# Patient Record
Sex: FEMALE | Race: WHITE | NOT HISPANIC OR LATINO
[De-identification: names, ages, dates, MRNs, and addresses within clinical notes are randomized per-mention and may not be internally consistent; named-entity substitution may affect disease eponyms.]

---

## 2017-02-28 ENCOUNTER — APPOINTMENT (OUTPATIENT)
Age: 52
End: 2017-02-28

## 2017-02-28 VITALS
HEIGHT: 66 IN | BODY MASS INDEX: 24.11 KG/M2 | TEMPERATURE: 98.2 F | DIASTOLIC BLOOD PRESSURE: 72 MMHG | SYSTOLIC BLOOD PRESSURE: 116 MMHG | HEART RATE: 68 BPM | WEIGHT: 150 LBS

## 2017-03-01 LAB
A1AT SERPL-MCNC: 108 MG/DL
ALBUMIN SERPL ELPH-MCNC: 4.5 G/DL
ALP BLD-CCNC: 68 U/L
ALT SERPL-CCNC: 52 U/L
ANION GAP SERPL CALC-SCNC: 13 MMOL/L
AST SERPL-CCNC: 26 U/L
BASOPHILS # BLD AUTO: 0.02 K/UL
BASOPHILS NFR BLD AUTO: 0.3 %
BILIRUB SERPL-MCNC: 0.2 MG/DL
BUN SERPL-MCNC: 19 MG/DL
CALCIUM SERPL-MCNC: 10 MG/DL
CERULOPLASMIN SERPL-MCNC: 20 MG/DL
CHLORIDE SERPL-SCNC: 101 MMOL/L
CO2 SERPL-SCNC: 26 MMOL/L
CREAT SERPL-MCNC: 0.87 MG/DL
DEPRECATED KAPPA LC FREE/LAMBDA SER: 1.29 RATIO
EOSINOPHIL # BLD AUTO: 0.09 K/UL
EOSINOPHIL NFR BLD AUTO: 1.1 %
FERRITIN SERPL-MCNC: 281.3 NG/ML
GLUCOSE SERPL-MCNC: 86 MG/DL
HBV CORE IGG+IGM SER QL: REACTIVE
HCT VFR BLD CALC: 36.8 %
HGB BLD-MCNC: 12 G/DL
IGA SER QL IEP: 175 MG/DL
IGG SER QL IEP: 1350 MG/DL
IGM SER QL IEP: 168 MG/DL
IMM GRANULOCYTES NFR BLD AUTO: 0.1 %
IRON SATN MFR SERPL: 27 %
IRON SERPL-MCNC: 77 UG/DL
KAPPA LC CSF-MCNC: 1.43 MG/DL
KAPPA LC SERPL-MCNC: 1.84 MG/DL
LYMPHOCYTES # BLD AUTO: 2.18 K/UL
LYMPHOCYTES NFR BLD AUTO: 27.7 %
MAN DIFF?: NORMAL
MCHC RBC-ENTMCNC: 30.6 PG
MCHC RBC-ENTMCNC: 32.6 GM/DL
MCV RBC AUTO: 93.9 FL
MONOCYTES # BLD AUTO: 0.54 K/UL
MONOCYTES NFR BLD AUTO: 6.9 %
NEUTROPHILS # BLD AUTO: 5.03 K/UL
NEUTROPHILS NFR BLD AUTO: 63.9 %
PLATELET # BLD AUTO: 239 K/UL
POTASSIUM SERPL-SCNC: 4 MMOL/L
PROT SERPL-MCNC: 7.4 G/DL
RBC # BLD: 3.92 M/UL
RBC # FLD: 12.7 %
SMOOTH MUSCLE AB SER QL IF: NORMAL
SODIUM SERPL-SCNC: 140 MMOL/L
TIBC SERPL-MCNC: 285 UG/DL
TSH SERPL-ACNC: 1.59 UIU/ML
TTG IGA SER IA-ACNC: <5 UNITS
TTG IGA SER-ACNC: NEGATIVE
UIBC SERPL-MCNC: 208 UG/DL
WBC # FLD AUTO: 7.87 K/UL

## 2017-03-02 LAB
ANA SER IF-ACNC: NEGATIVE
LKM AB SER QL IF: 1.3 UNITS

## 2017-03-03 LAB
HEV AB SER QL: NEGATIVE
SOLUBLE LIVER IGG SER IA-ACNC: < 20.1 UNITS

## 2017-03-10 ENCOUNTER — APPOINTMENT (OUTPATIENT)
Dept: ULTRASOUND IMAGING | Facility: HOSPITAL | Age: 52
End: 2017-03-10

## 2017-03-10 ENCOUNTER — OUTPATIENT (OUTPATIENT)
Dept: OUTPATIENT SERVICES | Facility: HOSPITAL | Age: 52
LOS: 1 days | End: 2017-03-10
Payer: COMMERCIAL

## 2017-03-10 DIAGNOSIS — R74.9 ABNORMAL SERUM ENZYME LEVEL, UNSPECIFIED: ICD-10-CM

## 2017-03-10 PROCEDURE — 76700 US EXAM ABDOM COMPLETE: CPT

## 2017-06-08 ENCOUNTER — APPOINTMENT (OUTPATIENT)
Age: 52
End: 2017-06-08

## 2018-02-14 ENCOUNTER — OUTPATIENT (OUTPATIENT)
Dept: OUTPATIENT SERVICES | Facility: HOSPITAL | Age: 53
LOS: 1 days | End: 2018-02-14
Payer: COMMERCIAL

## 2018-02-14 DIAGNOSIS — R05 COUGH: ICD-10-CM

## 2018-02-14 PROCEDURE — 71046 X-RAY EXAM CHEST 2 VIEWS: CPT

## 2018-02-14 PROCEDURE — 71046 X-RAY EXAM CHEST 2 VIEWS: CPT | Mod: 26

## 2018-02-17 ENCOUNTER — EMERGENCY (EMERGENCY)
Facility: HOSPITAL | Age: 53
LOS: 1 days | Discharge: HOME | End: 2018-02-17
Attending: EMERGENCY MEDICINE
Payer: COMMERCIAL

## 2018-02-17 VITALS
DIASTOLIC BLOOD PRESSURE: 74 MMHG | WEIGHT: 151.9 LBS | HEART RATE: 78 BPM | HEIGHT: 67 IN | TEMPERATURE: 98 F | SYSTOLIC BLOOD PRESSURE: 113 MMHG | OXYGEN SATURATION: 100 % | RESPIRATION RATE: 18 BRPM

## 2018-02-17 LAB
ALBUMIN SERPL ELPH-MCNC: 4.2 G/DL — SIGNIFICANT CHANGE UP (ref 3.5–5)
ALP SERPL-CCNC: 75 U/L — SIGNIFICANT CHANGE UP (ref 40–120)
ALT FLD-CCNC: 40 U/L DA — SIGNIFICANT CHANGE UP (ref 10–60)
ANION GAP SERPL CALC-SCNC: 4 MMOL/L — LOW (ref 5–17)
AST SERPL-CCNC: 19 U/L — SIGNIFICANT CHANGE UP (ref 10–40)
BASOPHILS # BLD AUTO: 0.1 K/UL — SIGNIFICANT CHANGE UP (ref 0–0.2)
BASOPHILS NFR BLD AUTO: 0.8 % — SIGNIFICANT CHANGE UP (ref 0–2)
BILIRUB SERPL-MCNC: 0.3 MG/DL — SIGNIFICANT CHANGE UP (ref 0.2–1.2)
BUN SERPL-MCNC: 17 MG/DL — SIGNIFICANT CHANGE UP (ref 7–18)
CALCIUM SERPL-MCNC: 9.6 MG/DL — SIGNIFICANT CHANGE UP (ref 8.4–10.5)
CHLORIDE SERPL-SCNC: 105 MMOL/L — SIGNIFICANT CHANGE UP (ref 96–108)
CO2 SERPL-SCNC: 27 MMOL/L — SIGNIFICANT CHANGE UP (ref 22–31)
CREAT SERPL-MCNC: 0.72 MG/DL — SIGNIFICANT CHANGE UP (ref 0.5–1.3)
EOSINOPHIL # BLD AUTO: 0.1 K/UL — SIGNIFICANT CHANGE UP (ref 0–0.5)
EOSINOPHIL NFR BLD AUTO: 1.1 % — SIGNIFICANT CHANGE UP (ref 0–6)
GLUCOSE SERPL-MCNC: 90 MG/DL — SIGNIFICANT CHANGE UP (ref 70–99)
HCG SERPL-ACNC: 1 MIU/ML — SIGNIFICANT CHANGE UP
HCT VFR BLD CALC: 43.3 % — SIGNIFICANT CHANGE UP (ref 34.5–45)
HGB BLD-MCNC: 14.1 G/DL — SIGNIFICANT CHANGE UP (ref 11.5–15.5)
LYMPHOCYTES # BLD AUTO: 1.9 K/UL — SIGNIFICANT CHANGE UP (ref 1–3.3)
LYMPHOCYTES # BLD AUTO: 23.7 % — SIGNIFICANT CHANGE UP (ref 13–44)
MCHC RBC-ENTMCNC: 29.6 PG — SIGNIFICANT CHANGE UP (ref 27–34)
MCHC RBC-ENTMCNC: 32.5 GM/DL — SIGNIFICANT CHANGE UP (ref 32–36)
MCV RBC AUTO: 91.1 FL — SIGNIFICANT CHANGE UP (ref 80–100)
MONOCYTES # BLD AUTO: 0.4 K/UL — SIGNIFICANT CHANGE UP (ref 0–0.9)
MONOCYTES NFR BLD AUTO: 5 % — SIGNIFICANT CHANGE UP (ref 2–14)
NEUTROPHILS # BLD AUTO: 5.6 K/UL — SIGNIFICANT CHANGE UP (ref 1.8–7.4)
NEUTROPHILS NFR BLD AUTO: 69.4 % — SIGNIFICANT CHANGE UP (ref 43–77)
PLATELET # BLD AUTO: 276 K/UL — SIGNIFICANT CHANGE UP (ref 150–400)
POTASSIUM SERPL-MCNC: 4.1 MMOL/L — SIGNIFICANT CHANGE UP (ref 3.5–5.3)
POTASSIUM SERPL-SCNC: 4.1 MMOL/L — SIGNIFICANT CHANGE UP (ref 3.5–5.3)
PROT SERPL-MCNC: 8.9 G/DL — HIGH (ref 6–8.3)
RBC # BLD: 4.75 M/UL — SIGNIFICANT CHANGE UP (ref 3.8–5.2)
RBC # FLD: 10.6 % — SIGNIFICANT CHANGE UP (ref 10.3–14.5)
SODIUM SERPL-SCNC: 136 MMOL/L — SIGNIFICANT CHANGE UP (ref 135–145)
WBC # BLD: 8.1 K/UL — SIGNIFICANT CHANGE UP (ref 3.8–10.5)
WBC # FLD AUTO: 8.1 K/UL — SIGNIFICANT CHANGE UP (ref 3.8–10.5)

## 2018-02-17 PROCEDURE — 99285 EMERGENCY DEPT VISIT HI MDM: CPT

## 2018-02-17 PROCEDURE — 85027 COMPLETE CBC AUTOMATED: CPT

## 2018-02-17 PROCEDURE — 96375 TX/PRO/DX INJ NEW DRUG ADDON: CPT

## 2018-02-17 PROCEDURE — 93005 ELECTROCARDIOGRAM TRACING: CPT

## 2018-02-17 PROCEDURE — 84702 CHORIONIC GONADOTROPIN TEST: CPT

## 2018-02-17 PROCEDURE — 96374 THER/PROPH/DIAG INJ IV PUSH: CPT

## 2018-02-17 PROCEDURE — 80053 COMPREHEN METABOLIC PANEL: CPT

## 2018-02-17 PROCEDURE — 72131 CT LUMBAR SPINE W/O DYE: CPT

## 2018-02-17 PROCEDURE — 36415 COLL VENOUS BLD VENIPUNCTURE: CPT

## 2018-02-17 PROCEDURE — 99284 EMERGENCY DEPT VISIT MOD MDM: CPT | Mod: 25

## 2018-02-17 PROCEDURE — 72131 CT LUMBAR SPINE W/O DYE: CPT | Mod: 26

## 2018-02-17 RX ORDER — IBUPROFEN 200 MG
1 TABLET ORAL
Qty: 30 | Refills: 0
Start: 2018-02-17 | End: 2018-02-26

## 2018-02-17 RX ORDER — CYCLOBENZAPRINE HYDROCHLORIDE 10 MG/1
1 TABLET, FILM COATED ORAL
Qty: 21 | Refills: 0 | OUTPATIENT
Start: 2018-02-17 | End: 2018-02-23

## 2018-02-17 RX ORDER — IBUPROFEN 200 MG
1 TABLET ORAL
Qty: 30 | Refills: 0 | OUTPATIENT
Start: 2018-02-17 | End: 2018-02-26

## 2018-02-17 RX ORDER — ONDANSETRON 8 MG/1
1 TABLET, FILM COATED ORAL
Qty: 15 | Refills: 0 | OUTPATIENT
Start: 2018-02-17 | End: 2018-02-21

## 2018-02-17 RX ORDER — ONDANSETRON 8 MG/1
1 TABLET, FILM COATED ORAL
Qty: 15 | Refills: 0
Start: 2018-02-17 | End: 2018-02-21

## 2018-02-17 RX ORDER — ONDANSETRON 8 MG/1
4 TABLET, FILM COATED ORAL ONCE
Qty: 0 | Refills: 0 | Status: COMPLETED | OUTPATIENT
Start: 2018-02-17 | End: 2018-02-17

## 2018-02-17 RX ORDER — SODIUM CHLORIDE 9 MG/ML
1000 INJECTION INTRAMUSCULAR; INTRAVENOUS; SUBCUTANEOUS ONCE
Qty: 0 | Refills: 0 | Status: COMPLETED | OUTPATIENT
Start: 2018-02-17 | End: 2018-02-17

## 2018-02-17 RX ORDER — OXYCODONE AND ACETAMINOPHEN 5; 325 MG/1; MG/1
1 TABLET ORAL ONCE
Qty: 0 | Refills: 0 | Status: DISCONTINUED | OUTPATIENT
Start: 2018-02-17 | End: 2018-02-17

## 2018-02-17 RX ORDER — KETOROLAC TROMETHAMINE 30 MG/ML
30 SYRINGE (ML) INJECTION ONCE
Qty: 0 | Refills: 0 | Status: DISCONTINUED | OUTPATIENT
Start: 2018-02-17 | End: 2018-02-17

## 2018-02-17 RX ORDER — MORPHINE SULFATE 50 MG/1
4 CAPSULE, EXTENDED RELEASE ORAL ONCE
Qty: 0 | Refills: 0 | Status: DISCONTINUED | OUTPATIENT
Start: 2018-02-17 | End: 2018-02-17

## 2018-02-17 RX ORDER — CYCLOBENZAPRINE HYDROCHLORIDE 10 MG/1
10 TABLET, FILM COATED ORAL ONCE
Qty: 0 | Refills: 0 | Status: COMPLETED | OUTPATIENT
Start: 2018-02-17 | End: 2018-02-17

## 2018-02-17 RX ORDER — CYCLOBENZAPRINE HYDROCHLORIDE 10 MG/1
1 TABLET, FILM COATED ORAL
Qty: 21 | Refills: 0
Start: 2018-02-17 | End: 2018-02-23

## 2018-02-17 RX ADMIN — SODIUM CHLORIDE 1000 MILLILITER(S): 9 INJECTION INTRAMUSCULAR; INTRAVENOUS; SUBCUTANEOUS at 11:50

## 2018-02-17 RX ADMIN — MORPHINE SULFATE 4 MILLIGRAM(S): 50 CAPSULE, EXTENDED RELEASE ORAL at 15:05

## 2018-02-17 RX ADMIN — Medication 30 MILLIGRAM(S): at 15:05

## 2018-02-17 RX ADMIN — ONDANSETRON 4 MILLIGRAM(S): 8 TABLET, FILM COATED ORAL at 11:50

## 2018-02-17 RX ADMIN — Medication 30 MILLIGRAM(S): at 11:49

## 2018-02-17 RX ADMIN — CYCLOBENZAPRINE HYDROCHLORIDE 10 MILLIGRAM(S): 10 TABLET, FILM COATED ORAL at 15:05

## 2018-02-17 RX ADMIN — MORPHINE SULFATE 4 MILLIGRAM(S): 50 CAPSULE, EXTENDED RELEASE ORAL at 11:49

## 2018-02-17 NOTE — ED PROVIDER NOTE - MEDICAL DECISION MAKING DETAILS
53 y/o F pt presents with lower back pain. Plan for labs, imaging, give pain medication, and reassess.

## 2018-02-17 NOTE — ED PROVIDER NOTE - OBJECTIVE STATEMENT
53 y/o F pt with PMHx of DVT and High Cholesterol and no significant PSHx presents to ED c/o lower back pain with radiation down the L leg x4 days. Pt describes lower back pain as worse with movement and severe in intensity. Per pt, pt was recently diagnosed with PNA, and is currently being treated for PNA; pt experienced a coughing spell x4 days ago, prior to onset of back pain. Pt denies bowel incontinence, urinary incontinence, numbness, tingling, or any other complaints. Allergies: Morphine (rash).

## 2018-02-17 NOTE — ED ADULT NURSE NOTE - OBJECTIVE STATEMENT
52 yr old female complained of back pain, pt stated she was diagnose with pnuemonia,vital signs stable

## 2018-02-17 NOTE — ED PROVIDER NOTE - PROGRESS NOTE DETAILS
Pt improved, ambulatory, in no distress, requesting to leave, outpatient MRI ordered and cre coordinator will follow up with pt Monday. precautions reviewed.

## 2018-02-17 NOTE — ED ADULT TRIAGE NOTE - CHIEF COMPLAINT QUOTE
patient brought in by EMS for back pain. as per family member patient recently diagnosed of PNA c/o back pain x 4 days

## 2018-05-30 ENCOUNTER — TRANSCRIPTION ENCOUNTER (OUTPATIENT)
Age: 53
End: 2018-05-30

## 2018-05-30 ENCOUNTER — APPOINTMENT (OUTPATIENT)
Dept: ORTHOPEDIC SURGERY | Facility: CLINIC | Age: 53
End: 2018-05-30
Payer: MEDICAID

## 2018-05-30 VITALS
HEIGHT: 66 IN | BODY MASS INDEX: 24.11 KG/M2 | SYSTOLIC BLOOD PRESSURE: 102 MMHG | WEIGHT: 150 LBS | HEART RATE: 76 BPM | DIASTOLIC BLOOD PRESSURE: 63 MMHG

## 2018-05-30 PROCEDURE — 99203 OFFICE O/P NEW LOW 30 MIN: CPT

## 2018-06-21 ENCOUNTER — EMERGENCY (EMERGENCY)
Facility: HOSPITAL | Age: 53
LOS: 1 days | Discharge: HOME | End: 2018-06-21
Attending: EMERGENCY MEDICINE
Payer: MEDICAID

## 2018-06-21 VITALS
HEIGHT: 66 IN | WEIGHT: 154.98 LBS | DIASTOLIC BLOOD PRESSURE: 68 MMHG | TEMPERATURE: 98 F | HEART RATE: 75 BPM | OXYGEN SATURATION: 99 % | RESPIRATION RATE: 16 BRPM | SYSTOLIC BLOOD PRESSURE: 115 MMHG

## 2018-06-21 VITALS
HEART RATE: 68 BPM | OXYGEN SATURATION: 97 % | RESPIRATION RATE: 17 BRPM | DIASTOLIC BLOOD PRESSURE: 67 MMHG | SYSTOLIC BLOOD PRESSURE: 116 MMHG | TEMPERATURE: 98 F

## 2018-06-21 LAB
ANION GAP SERPL CALC-SCNC: 6 MMOL/L — SIGNIFICANT CHANGE UP (ref 5–17)
BASOPHILS # BLD AUTO: 0.1 K/UL — SIGNIFICANT CHANGE UP (ref 0–0.2)
BASOPHILS NFR BLD AUTO: 0.9 % — SIGNIFICANT CHANGE UP (ref 0–2)
BUN SERPL-MCNC: 16 MG/DL — SIGNIFICANT CHANGE UP (ref 7–18)
CALCIUM SERPL-MCNC: 9.5 MG/DL — SIGNIFICANT CHANGE UP (ref 8.4–10.5)
CHLORIDE SERPL-SCNC: 107 MMOL/L — SIGNIFICANT CHANGE UP (ref 96–108)
CO2 SERPL-SCNC: 26 MMOL/L — SIGNIFICANT CHANGE UP (ref 22–31)
CREAT SERPL-MCNC: 0.65 MG/DL — SIGNIFICANT CHANGE UP (ref 0.5–1.3)
EOSINOPHIL # BLD AUTO: 0 K/UL — SIGNIFICANT CHANGE UP (ref 0–0.5)
EOSINOPHIL NFR BLD AUTO: 0.5 % — SIGNIFICANT CHANGE UP (ref 0–6)
GLUCOSE SERPL-MCNC: 101 MG/DL — HIGH (ref 70–99)
HCG SERPL-ACNC: 1 MIU/ML — SIGNIFICANT CHANGE UP
HCT VFR BLD CALC: 38.8 % — SIGNIFICANT CHANGE UP (ref 34.5–45)
HGB BLD-MCNC: 12.8 G/DL — SIGNIFICANT CHANGE UP (ref 11.5–15.5)
LYMPHOCYTES # BLD AUTO: 1.5 K/UL — SIGNIFICANT CHANGE UP (ref 1–3.3)
LYMPHOCYTES # BLD AUTO: 17.2 % — SIGNIFICANT CHANGE UP (ref 13–44)
MCHC RBC-ENTMCNC: 31.2 PG — SIGNIFICANT CHANGE UP (ref 27–34)
MCHC RBC-ENTMCNC: 33.1 GM/DL — SIGNIFICANT CHANGE UP (ref 32–36)
MCV RBC AUTO: 94.2 FL — SIGNIFICANT CHANGE UP (ref 80–100)
MONOCYTES # BLD AUTO: 0.6 K/UL — SIGNIFICANT CHANGE UP (ref 0–0.9)
MONOCYTES NFR BLD AUTO: 7.5 % — SIGNIFICANT CHANGE UP (ref 2–14)
NEUTROPHILS # BLD AUTO: 6.4 K/UL — SIGNIFICANT CHANGE UP (ref 1.8–7.4)
NEUTROPHILS NFR BLD AUTO: 74 % — SIGNIFICANT CHANGE UP (ref 43–77)
PLATELET # BLD AUTO: 210 K/UL — SIGNIFICANT CHANGE UP (ref 150–400)
POTASSIUM SERPL-MCNC: 3.8 MMOL/L — SIGNIFICANT CHANGE UP (ref 3.5–5.3)
POTASSIUM SERPL-SCNC: 3.8 MMOL/L — SIGNIFICANT CHANGE UP (ref 3.5–5.3)
RBC # BLD: 4.12 M/UL — SIGNIFICANT CHANGE UP (ref 3.8–5.2)
RBC # FLD: 11.7 % — SIGNIFICANT CHANGE UP (ref 10.3–14.5)
SODIUM SERPL-SCNC: 139 MMOL/L — SIGNIFICANT CHANGE UP (ref 135–145)
WBC # BLD: 8.6 K/UL — SIGNIFICANT CHANGE UP (ref 3.8–10.5)
WBC # FLD AUTO: 8.6 K/UL — SIGNIFICANT CHANGE UP (ref 3.8–10.5)

## 2018-06-21 PROCEDURE — 70360 X-RAY EXAM OF NECK: CPT | Mod: 26

## 2018-06-21 PROCEDURE — 84702 CHORIONIC GONADOTROPIN TEST: CPT

## 2018-06-21 PROCEDURE — 71275 CT ANGIOGRAPHY CHEST: CPT

## 2018-06-21 PROCEDURE — 71275 CT ANGIOGRAPHY CHEST: CPT | Mod: 26

## 2018-06-21 PROCEDURE — 71046 X-RAY EXAM CHEST 2 VIEWS: CPT | Mod: 26

## 2018-06-21 PROCEDURE — 93010 ELECTROCARDIOGRAM REPORT: CPT

## 2018-06-21 PROCEDURE — 99285 EMERGENCY DEPT VISIT HI MDM: CPT | Mod: 25

## 2018-06-21 PROCEDURE — 70360 X-RAY EXAM OF NECK: CPT

## 2018-06-21 PROCEDURE — 85027 COMPLETE CBC AUTOMATED: CPT

## 2018-06-21 PROCEDURE — 94640 AIRWAY INHALATION TREATMENT: CPT

## 2018-06-21 PROCEDURE — 96374 THER/PROPH/DIAG INJ IV PUSH: CPT | Mod: XU

## 2018-06-21 PROCEDURE — 99284 EMERGENCY DEPT VISIT MOD MDM: CPT | Mod: 25

## 2018-06-21 PROCEDURE — 93005 ELECTROCARDIOGRAM TRACING: CPT

## 2018-06-21 PROCEDURE — 80048 BASIC METABOLIC PNL TOTAL CA: CPT

## 2018-06-21 PROCEDURE — 71046 X-RAY EXAM CHEST 2 VIEWS: CPT

## 2018-06-21 RX ORDER — ONDANSETRON 8 MG/1
1 TABLET, FILM COATED ORAL
Qty: 12 | Refills: 0
Start: 2018-06-21

## 2018-06-21 RX ORDER — SODIUM CHLORIDE 9 MG/ML
3 INJECTION INTRAMUSCULAR; INTRAVENOUS; SUBCUTANEOUS ONCE
Qty: 0 | Refills: 0 | Status: COMPLETED | OUTPATIENT
Start: 2018-06-21 | End: 2018-06-21

## 2018-06-21 RX ORDER — ALBUTEROL 90 UG/1
2.5 AEROSOL, METERED ORAL ONCE
Qty: 0 | Refills: 0 | Status: COMPLETED | OUTPATIENT
Start: 2018-06-21 | End: 2018-06-21

## 2018-06-21 RX ADMIN — ALBUTEROL 2.5 MILLIGRAM(S): 90 AEROSOL, METERED ORAL at 06:01

## 2018-06-21 RX ADMIN — Medication 30 MILLILITER(S): at 12:18

## 2018-06-21 RX ADMIN — Medication 125 MILLIGRAM(S): at 11:05

## 2018-06-21 RX ADMIN — SODIUM CHLORIDE 3 MILLILITER(S): 9 INJECTION INTRAMUSCULAR; INTRAVENOUS; SUBCUTANEOUS at 08:30

## 2018-06-21 NOTE — ED PROVIDER NOTE - OBJECTIVE STATEMENT
CC of cough x 1 week,  associated with post tussive vomiting. Pt also states has throat tightness and shortness of breath. No fever, no chest pain.

## 2018-06-21 NOTE — ED PROVIDER NOTE - CARE PLAN
Principal Discharge DX:	Throat tightness  Secondary Diagnosis:	Dyspnea Principal Discharge DX:	GERD (gastroesophageal reflux disease)

## 2018-10-12 ENCOUNTER — OUTPATIENT (OUTPATIENT)
Dept: OUTPATIENT SERVICES | Facility: HOSPITAL | Age: 53
LOS: 1 days | End: 2018-10-12
Payer: MEDICAID

## 2018-10-12 DIAGNOSIS — M79.622 PAIN IN LEFT UPPER ARM: ICD-10-CM

## 2018-10-12 DIAGNOSIS — M79.602 PAIN IN LEFT ARM: ICD-10-CM

## 2018-10-12 DIAGNOSIS — R10.32 LEFT LOWER QUADRANT PAIN: ICD-10-CM

## 2018-10-12 PROCEDURE — 73090 X-RAY EXAM OF FOREARM: CPT

## 2018-10-12 PROCEDURE — 73090 X-RAY EXAM OF FOREARM: CPT | Mod: 26,LT

## 2018-10-16 ENCOUNTER — APPOINTMENT (OUTPATIENT)
Dept: DERMATOLOGY | Facility: CLINIC | Age: 53
End: 2018-10-16

## 2019-03-23 ENCOUNTER — EMERGENCY (EMERGENCY)
Facility: HOSPITAL | Age: 54
LOS: 1 days | Discharge: HOME | End: 2019-03-23
Attending: EMERGENCY MEDICINE
Payer: MEDICAID

## 2019-03-23 VITALS
SYSTOLIC BLOOD PRESSURE: 108 MMHG | RESPIRATION RATE: 16 BRPM | TEMPERATURE: 97 F | DIASTOLIC BLOOD PRESSURE: 71 MMHG | WEIGHT: 156.09 LBS | OXYGEN SATURATION: 100 % | HEART RATE: 84 BPM

## 2019-03-23 LAB
ALBUMIN SERPL ELPH-MCNC: 4.1 G/DL — SIGNIFICANT CHANGE UP (ref 3.5–5)
ALP SERPL-CCNC: 91 U/L — SIGNIFICANT CHANGE UP (ref 40–120)
ALT FLD-CCNC: 70 U/L DA — HIGH (ref 10–60)
ANION GAP SERPL CALC-SCNC: 7 MMOL/L — SIGNIFICANT CHANGE UP (ref 5–17)
AST SERPL-CCNC: 38 U/L — SIGNIFICANT CHANGE UP (ref 10–40)
BASOPHILS # BLD AUTO: 0.03 K/UL — SIGNIFICANT CHANGE UP (ref 0–0.2)
BASOPHILS NFR BLD AUTO: 0.4 % — SIGNIFICANT CHANGE UP (ref 0–2)
BILIRUB SERPL-MCNC: 0.3 MG/DL — SIGNIFICANT CHANGE UP (ref 0.2–1.2)
BUN SERPL-MCNC: 13 MG/DL — SIGNIFICANT CHANGE UP (ref 7–18)
CALCIUM SERPL-MCNC: 9.4 MG/DL — SIGNIFICANT CHANGE UP (ref 8.4–10.5)
CHLORIDE SERPL-SCNC: 109 MMOL/L — HIGH (ref 96–108)
CO2 SERPL-SCNC: 24 MMOL/L — SIGNIFICANT CHANGE UP (ref 22–31)
CREAT SERPL-MCNC: 0.87 MG/DL — SIGNIFICANT CHANGE UP (ref 0.5–1.3)
EOSINOPHIL # BLD AUTO: 0.1 K/UL — SIGNIFICANT CHANGE UP (ref 0–0.5)
EOSINOPHIL NFR BLD AUTO: 1.4 % — SIGNIFICANT CHANGE UP (ref 0–6)
FLU A RESULT: SIGNIFICANT CHANGE UP
FLU A RESULT: SIGNIFICANT CHANGE UP
FLUAV AG NPH QL: SIGNIFICANT CHANGE UP
FLUBV AG NPH QL: SIGNIFICANT CHANGE UP
GLUCOSE SERPL-MCNC: 124 MG/DL — HIGH (ref 70–99)
HCG SERPL-ACNC: 1 MIU/ML — SIGNIFICANT CHANGE UP
HCT VFR BLD CALC: 34.1 % — LOW (ref 34.5–45)
HGB BLD-MCNC: 11.7 G/DL — SIGNIFICANT CHANGE UP (ref 11.5–15.5)
IMM GRANULOCYTES NFR BLD AUTO: 0.1 % — SIGNIFICANT CHANGE UP (ref 0–1.5)
LYMPHOCYTES # BLD AUTO: 1.8 K/UL — SIGNIFICANT CHANGE UP (ref 1–3.3)
LYMPHOCYTES # BLD AUTO: 25.1 % — SIGNIFICANT CHANGE UP (ref 13–44)
MCHC RBC-ENTMCNC: 30.5 PG — SIGNIFICANT CHANGE UP (ref 27–34)
MCHC RBC-ENTMCNC: 34.3 GM/DL — SIGNIFICANT CHANGE UP (ref 32–36)
MCV RBC AUTO: 88.8 FL — SIGNIFICANT CHANGE UP (ref 80–100)
MONOCYTES # BLD AUTO: 0.61 K/UL — SIGNIFICANT CHANGE UP (ref 0–0.9)
MONOCYTES NFR BLD AUTO: 8.5 % — SIGNIFICANT CHANGE UP (ref 2–14)
NEUTROPHILS # BLD AUTO: 4.61 K/UL — SIGNIFICANT CHANGE UP (ref 1.8–7.4)
NEUTROPHILS NFR BLD AUTO: 64.5 % — SIGNIFICANT CHANGE UP (ref 43–77)
NRBC # BLD: 0 /100 WBCS — SIGNIFICANT CHANGE UP (ref 0–0)
NT-PROBNP SERPL-SCNC: 35 PG/ML — SIGNIFICANT CHANGE UP (ref 0–125)
PLATELET # BLD AUTO: 199 K/UL — SIGNIFICANT CHANGE UP (ref 150–400)
POTASSIUM SERPL-MCNC: 3.3 MMOL/L — LOW (ref 3.5–5.3)
POTASSIUM SERPL-SCNC: 3.3 MMOL/L — LOW (ref 3.5–5.3)
PROT SERPL-MCNC: 8.1 G/DL — SIGNIFICANT CHANGE UP (ref 6–8.3)
RBC # BLD: 3.84 M/UL — SIGNIFICANT CHANGE UP (ref 3.8–5.2)
RBC # FLD: 12.2 % — SIGNIFICANT CHANGE UP (ref 10.3–14.5)
RSV RESULT: SIGNIFICANT CHANGE UP
RSV RNA RESP QL NAA+PROBE: SIGNIFICANT CHANGE UP
SODIUM SERPL-SCNC: 140 MMOL/L — SIGNIFICANT CHANGE UP (ref 135–145)
TROPONIN I SERPL-MCNC: <0.015 NG/ML — SIGNIFICANT CHANGE UP (ref 0–0.04)
WBC # BLD: 7.16 K/UL — SIGNIFICANT CHANGE UP (ref 3.8–10.5)
WBC # FLD AUTO: 7.16 K/UL — SIGNIFICANT CHANGE UP (ref 3.8–10.5)

## 2019-03-23 PROCEDURE — 84702 CHORIONIC GONADOTROPIN TEST: CPT

## 2019-03-23 PROCEDURE — 99284 EMERGENCY DEPT VISIT MOD MDM: CPT | Mod: 25

## 2019-03-23 PROCEDURE — 87631 RESP VIRUS 3-5 TARGETS: CPT

## 2019-03-23 PROCEDURE — 84484 ASSAY OF TROPONIN QUANT: CPT

## 2019-03-23 PROCEDURE — 71046 X-RAY EXAM CHEST 2 VIEWS: CPT | Mod: 26

## 2019-03-23 PROCEDURE — 71046 X-RAY EXAM CHEST 2 VIEWS: CPT

## 2019-03-23 PROCEDURE — 85027 COMPLETE CBC AUTOMATED: CPT

## 2019-03-23 PROCEDURE — 36415 COLL VENOUS BLD VENIPUNCTURE: CPT

## 2019-03-23 PROCEDURE — 80053 COMPREHEN METABOLIC PANEL: CPT

## 2019-03-23 PROCEDURE — 96372 THER/PROPH/DIAG INJ SC/IM: CPT | Mod: XU

## 2019-03-23 PROCEDURE — 83880 ASSAY OF NATRIURETIC PEPTIDE: CPT

## 2019-03-23 PROCEDURE — 96374 THER/PROPH/DIAG INJ IV PUSH: CPT

## 2019-03-23 RX ORDER — DEXAMETHASONE 0.5 MG/5ML
8 ELIXIR ORAL ONCE
Qty: 0 | Refills: 0 | Status: COMPLETED | OUTPATIENT
Start: 2019-03-23 | End: 2019-03-23

## 2019-03-23 RX ORDER — SODIUM CHLORIDE 9 MG/ML
1000 INJECTION INTRAMUSCULAR; INTRAVENOUS; SUBCUTANEOUS ONCE
Qty: 0 | Refills: 0 | Status: COMPLETED | OUTPATIENT
Start: 2019-03-23 | End: 2019-03-23

## 2019-03-23 RX ADMIN — SODIUM CHLORIDE 1000 MILLILITER(S): 9 INJECTION INTRAMUSCULAR; INTRAVENOUS; SUBCUTANEOUS at 05:21

## 2019-03-23 RX ADMIN — Medication 25 MILLIGRAM(S): at 05:24

## 2019-03-23 RX ADMIN — Medication 8 MILLIGRAM(S): at 05:22

## 2019-03-23 NOTE — ED PROVIDER NOTE - OBJECTIVE STATEMENT
53 year old female PMH HLD, asthma coming in with 5 days of nonproductive coughing with associated cp with coughing and SOB. states she is having difficulty sleeping 2/2 coughing. Saw her PMD who started her on azithromycin (took 3 days of it) and albuterol nebulizer treatments which she has used but hasn't improved symptoms. denies fevers, chills, sweats, dizziness, abd pains, N/v/D/C, urinary complaints.

## 2019-03-23 NOTE — ED PROVIDER NOTE - CLINICAL SUMMARY MEDICAL DECISION MAKING FREE TEXT BOX
53 year old female with cough, sob, cp with coughing. vitals WNL. Pe as above.  labs, flu test, cxr, promethazine, fluids, reassess 53 year old female with cough, sob, cp with coughing. vitals WNL. Pe as above.  labs, flu test, cxr, promethazine, fluids, reassess  labs are unremarkable. flu negative. cxr unremarkable. after promethazine cough stopped. sleeping comfortably in ed. will dc with promethazine. f/u with PMD. advised to continue abx. return precautions given.

## 2019-03-23 NOTE — ED ADULT TRIAGE NOTE - CHIEF COMPLAINT QUOTE
cough and difficulty breathing for 5 days cough and difficulty breathing for 5 days, pt has been taking zithromax and nebulizer

## 2019-05-05 ENCOUNTER — EMERGENCY (EMERGENCY)
Facility: HOSPITAL | Age: 54
LOS: 1 days | Discharge: HOME | End: 2019-05-05
Attending: EMERGENCY MEDICINE
Payer: MEDICAID

## 2019-05-05 VITALS
HEIGHT: 66 IN | OXYGEN SATURATION: 97 % | SYSTOLIC BLOOD PRESSURE: 122 MMHG | TEMPERATURE: 98 F | WEIGHT: 164.91 LBS | DIASTOLIC BLOOD PRESSURE: 84 MMHG | HEART RATE: 70 BPM | RESPIRATION RATE: 16 BRPM

## 2019-05-05 LAB
ALBUMIN SERPL ELPH-MCNC: 3.9 G/DL — SIGNIFICANT CHANGE UP (ref 3.5–5)
ALP SERPL-CCNC: 101 U/L — SIGNIFICANT CHANGE UP (ref 40–120)
ALT FLD-CCNC: 216 U/L DA — HIGH (ref 10–60)
ANION GAP SERPL CALC-SCNC: 5 MMOL/L — SIGNIFICANT CHANGE UP (ref 5–17)
AST SERPL-CCNC: 81 U/L — HIGH (ref 10–40)
BASOPHILS # BLD AUTO: 0.02 K/UL — SIGNIFICANT CHANGE UP (ref 0–0.2)
BASOPHILS NFR BLD AUTO: 0.5 % — SIGNIFICANT CHANGE UP (ref 0–2)
BILIRUB SERPL-MCNC: 0.4 MG/DL — SIGNIFICANT CHANGE UP (ref 0.2–1.2)
BUN SERPL-MCNC: 14 MG/DL — SIGNIFICANT CHANGE UP (ref 7–18)
CALCIUM SERPL-MCNC: 9.2 MG/DL — SIGNIFICANT CHANGE UP (ref 8.4–10.5)
CHLORIDE SERPL-SCNC: 101 MMOL/L — SIGNIFICANT CHANGE UP (ref 96–108)
CO2 SERPL-SCNC: 30 MMOL/L — SIGNIFICANT CHANGE UP (ref 22–31)
CREAT SERPL-MCNC: 0.61 MG/DL — SIGNIFICANT CHANGE UP (ref 0.5–1.3)
EOSINOPHIL # BLD AUTO: 0.13 K/UL — SIGNIFICANT CHANGE UP (ref 0–0.5)
EOSINOPHIL NFR BLD AUTO: 2.9 % — SIGNIFICANT CHANGE UP (ref 0–6)
GLUCOSE SERPL-MCNC: 108 MG/DL — HIGH (ref 70–99)
HCG UR QL: NEGATIVE — SIGNIFICANT CHANGE UP
HCT VFR BLD CALC: 37.4 % — SIGNIFICANT CHANGE UP (ref 34.5–45)
HGB BLD-MCNC: 12.3 G/DL — SIGNIFICANT CHANGE UP (ref 11.5–15.5)
IMM GRANULOCYTES NFR BLD AUTO: 0.2 % — SIGNIFICANT CHANGE UP (ref 0–1.5)
LYMPHOCYTES # BLD AUTO: 1.12 K/UL — SIGNIFICANT CHANGE UP (ref 1–3.3)
LYMPHOCYTES # BLD AUTO: 25.2 % — SIGNIFICANT CHANGE UP (ref 13–44)
MCHC RBC-ENTMCNC: 29.9 PG — SIGNIFICANT CHANGE UP (ref 27–34)
MCHC RBC-ENTMCNC: 32.9 GM/DL — SIGNIFICANT CHANGE UP (ref 32–36)
MCV RBC AUTO: 91 FL — SIGNIFICANT CHANGE UP (ref 80–100)
MONOCYTES # BLD AUTO: 0.53 K/UL — SIGNIFICANT CHANGE UP (ref 0–0.9)
MONOCYTES NFR BLD AUTO: 11.9 % — SIGNIFICANT CHANGE UP (ref 2–14)
NEUTROPHILS # BLD AUTO: 2.63 K/UL — SIGNIFICANT CHANGE UP (ref 1.8–7.4)
NEUTROPHILS NFR BLD AUTO: 59.3 % — SIGNIFICANT CHANGE UP (ref 43–77)
NRBC # BLD: 0 /100 WBCS — SIGNIFICANT CHANGE UP (ref 0–0)
PLATELET # BLD AUTO: 200 K/UL — SIGNIFICANT CHANGE UP (ref 150–400)
POTASSIUM SERPL-MCNC: 3.8 MMOL/L — SIGNIFICANT CHANGE UP (ref 3.5–5.3)
POTASSIUM SERPL-SCNC: 3.8 MMOL/L — SIGNIFICANT CHANGE UP (ref 3.5–5.3)
PROT SERPL-MCNC: 7.6 G/DL — SIGNIFICANT CHANGE UP (ref 6–8.3)
RBC # BLD: 4.11 M/UL — SIGNIFICANT CHANGE UP (ref 3.8–5.2)
RBC # FLD: 12.6 % — SIGNIFICANT CHANGE UP (ref 10.3–14.5)
SODIUM SERPL-SCNC: 136 MMOL/L — SIGNIFICANT CHANGE UP (ref 135–145)
WBC # BLD: 4.44 K/UL — SIGNIFICANT CHANGE UP (ref 3.8–10.5)
WBC # FLD AUTO: 4.44 K/UL — SIGNIFICANT CHANGE UP (ref 3.8–10.5)

## 2019-05-05 PROCEDURE — 96374 THER/PROPH/DIAG INJ IV PUSH: CPT

## 2019-05-05 PROCEDURE — 81025 URINE PREGNANCY TEST: CPT

## 2019-05-05 PROCEDURE — 93005 ELECTROCARDIOGRAM TRACING: CPT

## 2019-05-05 PROCEDURE — 80053 COMPREHEN METABOLIC PANEL: CPT

## 2019-05-05 PROCEDURE — 85027 COMPLETE CBC AUTOMATED: CPT

## 2019-05-05 PROCEDURE — 99284 EMERGENCY DEPT VISIT MOD MDM: CPT | Mod: 25

## 2019-05-05 PROCEDURE — 36415 COLL VENOUS BLD VENIPUNCTURE: CPT

## 2019-05-05 RX ORDER — MECLIZINE HCL 12.5 MG
25 TABLET ORAL ONCE
Qty: 0 | Refills: 0 | Status: COMPLETED | OUTPATIENT
Start: 2019-05-05 | End: 2019-05-05

## 2019-05-05 RX ORDER — METOCLOPRAMIDE HCL 10 MG
10 TABLET ORAL ONCE
Qty: 0 | Refills: 0 | Status: COMPLETED | OUTPATIENT
Start: 2019-05-05 | End: 2019-05-05

## 2019-05-05 RX ORDER — SODIUM CHLORIDE 9 MG/ML
1000 INJECTION INTRAMUSCULAR; INTRAVENOUS; SUBCUTANEOUS ONCE
Qty: 0 | Refills: 0 | Status: COMPLETED | OUTPATIENT
Start: 2019-05-05 | End: 2019-05-05

## 2019-05-05 RX ORDER — MECLIZINE HCL 12.5 MG
1 TABLET ORAL
Qty: 10 | Refills: 0
Start: 2019-05-05

## 2019-05-05 RX ADMIN — SODIUM CHLORIDE 1000 MILLILITER(S): 9 INJECTION INTRAMUSCULAR; INTRAVENOUS; SUBCUTANEOUS at 01:24

## 2019-05-05 RX ADMIN — Medication 25 MILLIGRAM(S): at 01:26

## 2019-05-05 RX ADMIN — Medication 10 MILLIGRAM(S): at 02:43

## 2019-05-05 NOTE — ED ADULT NURSE NOTE - OBJECTIVE STATEMENT
Patient reports she has been dizzy and nauseous since this morning. Denies SOB or pain but has feels heaviness and pressure in her chest.

## 2019-05-05 NOTE — ED ADULT NURSE NOTE - CARDIO WDL
Normal rate, regular rhythm, normal S1, S2 heart sounds heard. 80 y/o F with h/o DM, CAD with stent, Rheumatoid Arthritis, Gout presenting with sudden onset right 1st toe pain that migrated to her foot and then her ankle rapidly over the course of 1 day likely gout- basic labs with uric acid, pain control, Colchicine, xray foot/ankle, Podiatry follow up outpt

## 2019-05-05 NOTE — ED PROVIDER NOTE - OBJECTIVE STATEMENT
53 F complaining of dizziness worsening today with associated nausea and vomiting.  Had vertigo once before with similar symptoms.  No other complaints.  Denies chest pain, shortness of breath or other symptoms.

## 2019-10-10 ENCOUNTER — OUTPATIENT (OUTPATIENT)
Dept: OUTPATIENT SERVICES | Facility: HOSPITAL | Age: 54
LOS: 1 days | End: 2019-10-10
Payer: MEDICAID

## 2019-10-10 DIAGNOSIS — Z00.00 ENCOUNTER FOR GENERAL ADULT MEDICAL EXAMINATION WITHOUT ABNORMAL FINDINGS: ICD-10-CM

## 2019-10-10 PROCEDURE — 71046 X-RAY EXAM CHEST 2 VIEWS: CPT

## 2019-10-10 PROCEDURE — 71046 X-RAY EXAM CHEST 2 VIEWS: CPT | Mod: 26

## 2020-04-12 NOTE — ED ADULT NURSE NOTE - NS PRO PASSIVE SMOKE EXP
Patient reports left knee pain, redness, swelling, and stiffness. This started Friday. History of bilateral knee replacements. Denies fevers, cough, shortness of breath.  
No

## 2020-05-04 ENCOUNTER — EMERGENCY (EMERGENCY)
Facility: HOSPITAL | Age: 55
LOS: 1 days | Discharge: HOME | End: 2020-05-04
Attending: EMERGENCY MEDICINE
Payer: MEDICAID

## 2020-05-04 VITALS
HEIGHT: 66 IN | DIASTOLIC BLOOD PRESSURE: 85 MMHG | HEART RATE: 78 BPM | TEMPERATURE: 98 F | SYSTOLIC BLOOD PRESSURE: 132 MMHG | RESPIRATION RATE: 22 BRPM | WEIGHT: 164.91 LBS | OXYGEN SATURATION: 97 %

## 2020-05-04 LAB
ALBUMIN SERPL ELPH-MCNC: 4.1 G/DL — SIGNIFICANT CHANGE UP (ref 3.5–5)
ALP SERPL-CCNC: 78 U/L — SIGNIFICANT CHANGE UP (ref 40–120)
ALT FLD-CCNC: 51 U/L DA — SIGNIFICANT CHANGE UP (ref 10–60)
ANION GAP SERPL CALC-SCNC: 7 MMOL/L — SIGNIFICANT CHANGE UP (ref 5–17)
AST SERPL-CCNC: 29 U/L — SIGNIFICANT CHANGE UP (ref 10–40)
BASOPHILS # BLD AUTO: 0.04 K/UL — SIGNIFICANT CHANGE UP (ref 0–0.2)
BASOPHILS NFR BLD AUTO: 0.4 % — SIGNIFICANT CHANGE UP (ref 0–2)
BILIRUB SERPL-MCNC: 0.4 MG/DL — SIGNIFICANT CHANGE UP (ref 0.2–1.2)
BUN SERPL-MCNC: 22 MG/DL — HIGH (ref 7–18)
CALCIUM SERPL-MCNC: 8.9 MG/DL — SIGNIFICANT CHANGE UP (ref 8.4–10.5)
CHLORIDE SERPL-SCNC: 105 MMOL/L — SIGNIFICANT CHANGE UP (ref 96–108)
CO2 SERPL-SCNC: 24 MMOL/L — SIGNIFICANT CHANGE UP (ref 22–31)
CREAT SERPL-MCNC: 0.74 MG/DL — SIGNIFICANT CHANGE UP (ref 0.5–1.3)
EOSINOPHIL # BLD AUTO: 0.06 K/UL — SIGNIFICANT CHANGE UP (ref 0–0.5)
EOSINOPHIL NFR BLD AUTO: 0.6 % — SIGNIFICANT CHANGE UP (ref 0–6)
GLUCOSE SERPL-MCNC: 108 MG/DL — HIGH (ref 70–99)
HCG SERPL-ACNC: 1 MIU/ML — SIGNIFICANT CHANGE UP
HCT VFR BLD CALC: 37 % — SIGNIFICANT CHANGE UP (ref 34.5–45)
HGB BLD-MCNC: 12.3 G/DL — SIGNIFICANT CHANGE UP (ref 11.5–15.5)
IMM GRANULOCYTES NFR BLD AUTO: 0.3 % — SIGNIFICANT CHANGE UP (ref 0–1.5)
LYMPHOCYTES # BLD AUTO: 1.66 K/UL — SIGNIFICANT CHANGE UP (ref 1–3.3)
LYMPHOCYTES # BLD AUTO: 17.2 % — SIGNIFICANT CHANGE UP (ref 13–44)
MCHC RBC-ENTMCNC: 30.2 PG — SIGNIFICANT CHANGE UP (ref 27–34)
MCHC RBC-ENTMCNC: 33.2 GM/DL — SIGNIFICANT CHANGE UP (ref 32–36)
MCV RBC AUTO: 90.9 FL — SIGNIFICANT CHANGE UP (ref 80–100)
MONOCYTES # BLD AUTO: 0.52 K/UL — SIGNIFICANT CHANGE UP (ref 0–0.9)
MONOCYTES NFR BLD AUTO: 5.4 % — SIGNIFICANT CHANGE UP (ref 2–14)
NEUTROPHILS # BLD AUTO: 7.36 K/UL — SIGNIFICANT CHANGE UP (ref 1.8–7.4)
NEUTROPHILS NFR BLD AUTO: 76.1 % — SIGNIFICANT CHANGE UP (ref 43–77)
NRBC # BLD: 0 /100 WBCS — SIGNIFICANT CHANGE UP (ref 0–0)
PLATELET # BLD AUTO: 170 K/UL — SIGNIFICANT CHANGE UP (ref 150–400)
POTASSIUM SERPL-MCNC: 4.3 MMOL/L — SIGNIFICANT CHANGE UP (ref 3.5–5.3)
POTASSIUM SERPL-SCNC: 4.3 MMOL/L — SIGNIFICANT CHANGE UP (ref 3.5–5.3)
PROT SERPL-MCNC: 8.3 G/DL — SIGNIFICANT CHANGE UP (ref 6–8.3)
RBC # BLD: 4.07 M/UL — SIGNIFICANT CHANGE UP (ref 3.8–5.2)
RBC # FLD: 12.3 % — SIGNIFICANT CHANGE UP (ref 10.3–14.5)
SARS-COV-2 RNA SPEC QL NAA+PROBE: SIGNIFICANT CHANGE UP
SODIUM SERPL-SCNC: 136 MMOL/L — SIGNIFICANT CHANGE UP (ref 135–145)
TROPONIN I SERPL-MCNC: <0.015 NG/ML — SIGNIFICANT CHANGE UP (ref 0–0.04)
WBC # BLD: 9.67 K/UL — SIGNIFICANT CHANGE UP (ref 3.8–10.5)
WBC # FLD AUTO: 9.67 K/UL — SIGNIFICANT CHANGE UP (ref 3.8–10.5)

## 2020-05-04 PROCEDURE — 36415 COLL VENOUS BLD VENIPUNCTURE: CPT

## 2020-05-04 PROCEDURE — 96375 TX/PRO/DX INJ NEW DRUG ADDON: CPT

## 2020-05-04 PROCEDURE — 96374 THER/PROPH/DIAG INJ IV PUSH: CPT

## 2020-05-04 PROCEDURE — 80053 COMPREHEN METABOLIC PANEL: CPT

## 2020-05-04 PROCEDURE — 84702 CHORIONIC GONADOTROPIN TEST: CPT

## 2020-05-04 PROCEDURE — 71045 X-RAY EXAM CHEST 1 VIEW: CPT | Mod: 26

## 2020-05-04 PROCEDURE — 85610 PROTHROMBIN TIME: CPT

## 2020-05-04 PROCEDURE — 99285 EMERGENCY DEPT VISIT HI MDM: CPT

## 2020-05-04 PROCEDURE — 87635 SARS-COV-2 COVID-19 AMP PRB: CPT

## 2020-05-04 PROCEDURE — 85730 THROMBOPLASTIN TIME PARTIAL: CPT

## 2020-05-04 PROCEDURE — 84484 ASSAY OF TROPONIN QUANT: CPT

## 2020-05-04 PROCEDURE — 70450 CT HEAD/BRAIN W/O DYE: CPT

## 2020-05-04 PROCEDURE — 99284 EMERGENCY DEPT VISIT MOD MDM: CPT | Mod: 25

## 2020-05-04 PROCEDURE — 70450 CT HEAD/BRAIN W/O DYE: CPT | Mod: 26

## 2020-05-04 PROCEDURE — 71045 X-RAY EXAM CHEST 1 VIEW: CPT

## 2020-05-04 PROCEDURE — 85027 COMPLETE CBC AUTOMATED: CPT

## 2020-05-04 RX ORDER — SODIUM CHLORIDE 9 MG/ML
1000 INJECTION INTRAMUSCULAR; INTRAVENOUS; SUBCUTANEOUS
Refills: 0 | Status: DISCONTINUED | OUTPATIENT
Start: 2020-05-04 | End: 2020-05-08

## 2020-05-04 RX ORDER — ONDANSETRON 8 MG/1
4 TABLET, FILM COATED ORAL ONCE
Refills: 0 | Status: COMPLETED | OUTPATIENT
Start: 2020-05-04 | End: 2020-05-04

## 2020-05-04 RX ORDER — SODIUM CHLORIDE 9 MG/ML
3 INJECTION INTRAMUSCULAR; INTRAVENOUS; SUBCUTANEOUS ONCE
Refills: 0 | Status: COMPLETED | OUTPATIENT
Start: 2020-05-04 | End: 2020-05-04

## 2020-05-04 RX ORDER — MECLIZINE HCL 12.5 MG
1 TABLET ORAL
Qty: 20 | Refills: 0
Start: 2020-05-04

## 2020-05-04 RX ADMIN — SODIUM CHLORIDE 150 MILLILITER(S): 9 INJECTION INTRAMUSCULAR; INTRAVENOUS; SUBCUTANEOUS at 01:37

## 2020-05-04 RX ADMIN — ONDANSETRON 4 MILLIGRAM(S): 8 TABLET, FILM COATED ORAL at 01:35

## 2020-05-04 RX ADMIN — SODIUM CHLORIDE 3 MILLILITER(S): 9 INJECTION INTRAMUSCULAR; INTRAVENOUS; SUBCUTANEOUS at 01:30

## 2020-05-04 RX ADMIN — SODIUM CHLORIDE 1000 MILLILITER(S): 9 INJECTION INTRAMUSCULAR; INTRAVENOUS; SUBCUTANEOUS at 01:59

## 2020-05-04 RX ADMIN — Medication 1 MILLIGRAM(S): at 01:35

## 2020-05-04 NOTE — ED PROVIDER NOTE - OBJECTIVE STATEMENT
Pt with chronic vertigo, states on Meclizine with episodes 3 x /year. pt states current vertigo started at 8:30pm last night, described as room spinning. No LOC, no motor weakness.

## 2020-05-04 NOTE — ED PROVIDER NOTE - CHPI ED SYMPTOMS NEG
no blurred vision/no change in level of consciousness/no loss of consciousness/no numbness/no weakness/no fever

## 2020-05-04 NOTE — ED PROVIDER NOTE - NSFOLLOWUPINSTRUCTIONS_ED_ALL_ED_FT
Return if dizziness, weakness, vomiting, any concerns. See your doctor as soon as possible (within 1-2 days).   If you need further assistance for appointments you can contact the [facility] Care Coordinator at [phone] or the [facility] Patient Access Services helpline at [phone] to find names/contact #s for a practitioner to follow up with.  Bring your discharge papers / test results with you to any follow up appointments.   In addition our outpatient [facility] is located at [address] [place] [address], tel: [phone].

## 2020-05-04 NOTE — ED PROVIDER NOTE - PHYSICAL EXAMINATION
Kernig and Brudzinski signs area negative, no petechiae, no photophobia, no dysarthria, no facial asymmetry, no focal deficits.   No nystagmus, no ataxia,  NIH stroke scale is zero. Pt passed dysphagia screen.

## 2020-05-04 NOTE — ED PROVIDER NOTE - CLINICAL SUMMARY MEDICAL DECISION MAKING FREE TEXT BOX
320a- Pt feels better, no nystagmus, no ataxia, no focal deficits. Pt requesting discharge now. Son Tate will accompany pt home.   Pt is well appearing, has no new complaints and able to walk with normal gait. Pt is stable for discharge and follow up with medical doctor. Pt educated on care and need for follow up. Discussed anticipatory guidance and return precautions. Questions answered. I had a detailed discussion with the patient and/or guardian regarding the historical points, exam findings, and any diagnostic results supporting the discharge diagnosis.

## 2020-05-04 NOTE — ED PROVIDER NOTE - NSFOLLOWUPCLINICS_GEN_ALL_ED_FT
[facility]  Neurology  [address] [place] [address]  Phone: [phone]  Fax: [phone]  Follow Up Time:

## 2020-05-04 NOTE — ED PROVIDER NOTE - PATIENT PORTAL LINK FT
You can access the FollowMyHealth Patient Portal offered by [facility] by registering at the following website: [email or web address]. By joining [facility]’s FollowMyHealth portal, you will also be able to view your health information using other applications (apps) compatible with our system.

## 2020-12-23 PROBLEM — R42 DIZZINESS AND GIDDINESS: Chronic | Status: ACTIVE | Noted: 2020-05-04

## 2021-01-29 ENCOUNTER — APPOINTMENT (OUTPATIENT)
Dept: RHEUMATOLOGY | Facility: CLINIC | Age: 56
End: 2021-01-29

## 2021-03-16 ENCOUNTER — TRANSCRIPTION ENCOUNTER (OUTPATIENT)
Age: 56
End: 2021-03-16

## 2021-03-19 ENCOUNTER — APPOINTMENT (OUTPATIENT)
Dept: RHEUMATOLOGY | Facility: CLINIC | Age: 56
End: 2021-03-19
Payer: MEDICARE

## 2021-03-19 VITALS
SYSTOLIC BLOOD PRESSURE: 129 MMHG | HEIGHT: 66 IN | TEMPERATURE: 97.7 F | RESPIRATION RATE: 16 BRPM | BODY MASS INDEX: 26.84 KG/M2 | HEART RATE: 80 BPM | WEIGHT: 167 LBS | OXYGEN SATURATION: 98 % | DIASTOLIC BLOOD PRESSURE: 76 MMHG

## 2021-03-19 DIAGNOSIS — M75.100 UNSPECIFIED ROTATOR CUFF TEAR OR RUPTURE OF UNSPECIFIED SHOULDER, NOT SPECIFIED AS TRAUMATIC: ICD-10-CM

## 2021-03-19 DIAGNOSIS — E28.319 ASYMPTOMATIC PREMATURE MENOPAUSE: ICD-10-CM

## 2021-03-19 PROCEDURE — 99204 OFFICE O/P NEW MOD 45 MIN: CPT

## 2021-03-19 RX ORDER — CELECOXIB 200 MG/1
200 CAPSULE ORAL TWICE DAILY
Qty: 60 | Refills: 2 | Status: ACTIVE | COMMUNITY
Start: 2021-03-19 | End: 1900-01-01

## 2021-03-19 RX ORDER — TRAMADOL HYDROCHLORIDE 25 MG/1
TABLET, COATED ORAL
Refills: 0 | Status: ACTIVE | COMMUNITY

## 2021-03-19 NOTE — ASSESSMENT
[FreeTextEntry1] : 55 year-old female with long standing hx of joint pain, now worsening with bilateral symmetrical joint pain and significant stiffness\par \par 1. RC tear - right shoulder - likely complete - send for MRI - may need surgery as patient has completed 6 months of PT last year\par 2. Inflammatory arthritis - likely - start celecoxib to help with her pain - defer steroids at this time. \par full rheum work-up - send for hand X-rays\par 3. hx of clotting - was on birth control at the time and had surgery as well\par 4. early menopause -  send for bone density\par \par \par patient to call in 1 week to discuss results and next steps\par \par f/u 6 weeks

## 2021-03-19 NOTE — HISTORY OF PRESENT ILLNESS
[FreeTextEntry1] : patient with joint pain of many year, now worsening with stiffness\par lasting over 2 hours - improves as she moves around\par Affected joints are ankle, shoulders, hands/wrists, knees -\par Takes tramadol with moderate relief\par mild swelling - no redness\par Dry mouth - constant - even though drinks 1 liter of water a day\par has to constant massage her hands - \par reports bucking of her knees at knees - has difficulty with stair\par had left knee surgery for meniscal repair\par Mother has RA\par right shoulder pain - completed 6 months of PT last year with no improvement\par early menopause at age 39\par had a blood clot after pelvic surgery -  - was taking birth control pill at the time\par Chronic back pain - on baclofen had epidurals in the past\par \par Denies any fevers, chill, rashes, weight loss,fatigue,  hair loss,  dry eyes or mouth, mouth sores, Raynaud's. chest pain or SOB, GI or , numbness/tingling\par \par

## 2021-03-19 NOTE — PHYSICAL EXAM
[General Appearance - Alert] : alert [General Appearance - In No Acute Distress] : in no acute distress [Neck Appearance] : the appearance of the neck was normal [Neck Cervical Mass (___cm)] : no neck mass was observed [Jugular Venous Distention Increased] : there was no jugular-venous distention [Thyroid Diffuse Enlargement] : the thyroid was not enlarged [Thyroid Nodule] : there were no palpable thyroid nodules [Auscultation Breath Sounds / Voice Sounds] : lungs were clear to auscultation bilaterally [Heart Rate And Rhythm] : heart rate was normal and rhythm regular [Heart Sounds] : normal S1 and S2 [Heart Sounds Gallop] : no gallops [Murmurs] : no murmurs [Heart Sounds Pericardial Friction Rub] : no pericardial rub [Full Pulse] : the pedal pulses are present [Edema] : there was no peripheral edema [Bowel Sounds] : normal bowel sounds [Abdomen Soft] : soft [Abdomen Tenderness] : non-tender [Abdomen Mass (___ Cm)] : no abdominal mass palpated [Cervical Lymph Nodes Enlarged Posterior Bilaterally] : posterior cervical [Cervical Lymph Nodes Enlarged Anterior Bilaterally] : anterior cervical [Supraclavicular Lymph Nodes Enlarged Bilaterally] : supraclavicular [No CVA Tenderness] : no ~M costovertebral angle tenderness [No Spinal Tenderness] : no spinal tenderness [Abnormal Walk] : normal gait [Nail Clubbing] : no clubbing  or cyanosis of the fingernails [Musculoskeletal - Swelling] : no joint swelling seen [Motor Tone] : muscle strength and tone were normal [FreeTextEntry1] : right shoulder with empty can sing, bilateral wrist synovitis and tenderness to palpation, bialteral mcp's and pip with tenderness and difficulty making a fist due to pain and stiffness - bialteral ankle tenderness [Skin Color & Pigmentation] : normal skin color and pigmentation [Skin Turgor] : normal skin turgor [] : no rash [Oriented To Time, Place, And Person] : oriented to person, place, and time [Impaired Insight] : insight and judgment were intact [Affect] : the affect was normal

## 2021-04-09 LAB
ALBUMIN SERPL ELPH-MCNC: 4.8 G/DL
ALP BLD-CCNC: 79 U/L
ALT SERPL-CCNC: 58 U/L
ANION GAP SERPL CALC-SCNC: 12 MMOL/L
APPEARANCE: CLEAR
AST SERPL-CCNC: 33 U/L
BASOPHILS # BLD AUTO: 0.05 K/UL
BASOPHILS NFR BLD AUTO: 0.9 %
BILIRUB SERPL-MCNC: 0.4 MG/DL
BILIRUBIN URINE: NEGATIVE
BLOOD URINE: NEGATIVE
BUN SERPL-MCNC: 18 MG/DL
CALCIUM SERPL-MCNC: 10 MG/DL
CCP AB SER IA-ACNC: 11 UNITS
CHLORIDE SERPL-SCNC: 102 MMOL/L
CO2 SERPL-SCNC: 26 MMOL/L
COLOR: NORMAL
CREAT SERPL-MCNC: 0.58 MG/DL
CRP SERPL-MCNC: <3 MG/L
ENA RNP AB SER IA-ACNC: <0.2 AL
ENA SM AB SER IA-ACNC: <0.2 AL
ENA SS-A AB SER IA-ACNC: <0.2 AL
ENA SS-B AB SER IA-ACNC: <0.2 AL
EOSINOPHIL # BLD AUTO: 0.12 K/UL
EOSINOPHIL NFR BLD AUTO: 2.1 %
ERYTHROCYTE [SEDIMENTATION RATE] IN BLOOD BY WESTERGREN METHOD: 7 MM/HR
GLUCOSE QUALITATIVE U: NEGATIVE
GLUCOSE SERPL-MCNC: 99 MG/DL
HCT VFR BLD CALC: 38.7 %
HGB BLD-MCNC: 12 G/DL
IMM GRANULOCYTES NFR BLD AUTO: 0.2 %
KETONES URINE: NEGATIVE
LEUKOCYTE ESTERASE URINE: NEGATIVE
LYMPHOCYTES # BLD AUTO: 1.57 K/UL
LYMPHOCYTES NFR BLD AUTO: 27 %
MAN DIFF?: NORMAL
MCHC RBC-ENTMCNC: 30.5 PG
MCHC RBC-ENTMCNC: 31 GM/DL
MCV RBC AUTO: 98.2 FL
MONOCYTES # BLD AUTO: 0.43 K/UL
MONOCYTES NFR BLD AUTO: 7.4 %
NEUTROPHILS # BLD AUTO: 3.63 K/UL
NEUTROPHILS NFR BLD AUTO: 62.4 %
NITRITE URINE: NEGATIVE
PH URINE: 6
PLATELET # BLD AUTO: 218 K/UL
POTASSIUM SERPL-SCNC: 4.3 MMOL/L
PROT SERPL-MCNC: 7.4 G/DL
PROTEIN URINE: NEGATIVE
RBC # BLD: 3.94 M/UL
RBC # FLD: 13.4 %
RF+CCP IGG SER-IMP: NEGATIVE
RHEUMATOID FACT SER QL: <10 IU/ML
SODIUM SERPL-SCNC: 140 MMOL/L
SPECIFIC GRAVITY URINE: 1.02
UROBILINOGEN URINE: NORMAL
WBC # FLD AUTO: 5.81 K/UL

## 2021-04-28 DIAGNOSIS — M75.02 ADHESIVE CAPSULITIS OF RIGHT SHOULDER: ICD-10-CM

## 2021-04-28 DIAGNOSIS — M75.01 ADHESIVE CAPSULITIS OF RIGHT SHOULDER: ICD-10-CM

## 2021-04-28 DIAGNOSIS — E55.9 VITAMIN D DEFICIENCY, UNSPECIFIED: ICD-10-CM

## 2021-04-28 DIAGNOSIS — M19.049 PRIMARY OSTEOARTHRITIS, UNSPECIFIED HAND: ICD-10-CM

## 2021-04-28 RX ORDER — ERGOCALCIFEROL 1.25 MG/1
1.25 MG CAPSULE, LIQUID FILLED ORAL
Qty: 12 | Refills: 3 | Status: ACTIVE | COMMUNITY
Start: 2021-04-28 | End: 1900-01-01

## 2021-04-30 ENCOUNTER — APPOINTMENT (OUTPATIENT)
Dept: RHEUMATOLOGY | Facility: CLINIC | Age: 56
End: 2021-04-30

## 2021-07-19 NOTE — ED ADULT NURSE NOTE - TEMPLATE LIST FOR HEAD TO TOE ASSESSMENT
From: Kristel Courtney  To: Ezio Fay  Sent: 7/19/2021 7:14 AM CDT  Subject: Other    This message is being sent by Noemí Gonsales on behalf of Kristel Coutrney.    Kristel needs a refill on her vyvans.   Neuro

## 2021-08-17 NOTE — ED ADULT NURSE NOTE - NS ED NURSE LEVEL OF CONSCIOUSNESS ORIENTATION
Oriented - self; Oriented - place; Oriented - time Dapsone Counseling: I discussed with the patient the risks of dapsone including but not limited to hemolytic anemia, agranulocytosis, rashes, methemoglobinemia, kidney failure, peripheral neuropathy, headaches, GI upset, and liver toxicity.  Patients who start dapsone require monitoring including baseline LFTs and weekly CBCs for the first month, then every month thereafter.  The patient verbalized understanding of the proper use and possible adverse effects of dapsone.  All of the patient's questions and concerns were addressed.

## 2021-09-28 ENCOUNTER — EMERGENCY (EMERGENCY)
Facility: HOSPITAL | Age: 56
LOS: 1 days | Discharge: HOME | End: 2021-09-28
Attending: EMERGENCY MEDICINE
Payer: MEDICARE

## 2021-09-28 VITALS
RESPIRATION RATE: 18 BRPM | HEIGHT: 66 IN | OXYGEN SATURATION: 98 % | WEIGHT: 154.98 LBS | DIASTOLIC BLOOD PRESSURE: 87 MMHG | HEART RATE: 74 BPM | SYSTOLIC BLOOD PRESSURE: 128 MMHG | TEMPERATURE: 98 F

## 2021-09-28 LAB
APPEARANCE UR: CLEAR — SIGNIFICANT CHANGE UP
BILIRUB UR-MCNC: ABNORMAL
COLOR SPEC: ABNORMAL
DIFF PNL FLD: ABNORMAL
GLUCOSE UR QL: NEGATIVE — SIGNIFICANT CHANGE UP
KETONES UR-MCNC: NEGATIVE — SIGNIFICANT CHANGE UP
LEUKOCYTE ESTERASE UR-ACNC: ABNORMAL
NITRITE UR-MCNC: POSITIVE
PH UR: 6.5 — SIGNIFICANT CHANGE UP (ref 5–8)
PROT UR-MCNC: 15
SP GR SPEC: 1 — LOW (ref 1.01–1.02)
UROBILINOGEN FLD QL: 8

## 2021-09-28 PROCEDURE — 99283 EMERGENCY DEPT VISIT LOW MDM: CPT

## 2021-09-28 RX ORDER — CEFUROXIME AXETIL 250 MG
500 TABLET ORAL ONCE
Refills: 0 | Status: COMPLETED | OUTPATIENT
Start: 2021-09-28 | End: 2021-09-28

## 2021-09-28 RX ORDER — PHENAZOPYRIDINE HCL 100 MG
1 TABLET ORAL
Qty: 6 | Refills: 0
Start: 2021-09-28 | End: 2021-09-29

## 2021-09-28 RX ORDER — CEFUROXIME AXETIL 250 MG
1 TABLET ORAL
Qty: 14 | Refills: 0
Start: 2021-09-28 | End: 2021-10-04

## 2021-09-28 RX ORDER — ONDANSETRON 8 MG/1
4 TABLET, FILM COATED ORAL ONCE
Refills: 0 | Status: COMPLETED | OUTPATIENT
Start: 2021-09-28 | End: 2021-09-28

## 2021-09-28 RX ORDER — ONDANSETRON 8 MG/1
1 TABLET, FILM COATED ORAL
Qty: 12 | Refills: 0
Start: 2021-09-28

## 2021-09-28 RX ORDER — PHENAZOPYRIDINE HCL 100 MG
100 TABLET ORAL ONCE
Refills: 0 | Status: COMPLETED | OUTPATIENT
Start: 2021-09-28 | End: 2021-09-28

## 2021-09-28 RX ADMIN — Medication 100 MILLIGRAM(S): at 23:28

## 2021-09-28 RX ADMIN — Medication 500 MILLIGRAM(S): at 23:29

## 2021-09-28 RX ADMIN — ONDANSETRON 4 MILLIGRAM(S): 8 TABLET, FILM COATED ORAL at 23:29

## 2021-09-28 NOTE — ED ADULT NURSE NOTE - OBJECTIVE STATEMENT
JOHANA SYED COVERING NOTES: AOX4 +ambulatory patient reports burning in urination and lower abdominal pains with nausea x 2 days. denies any fevers or chills

## 2021-09-28 NOTE — ED PROVIDER NOTE - CARE PROVIDER_API CALL
He Esteban  UROLOGY  [address] [place] [address]  Phone: [phone]  Fax: [phone]  Follow Up Time:

## 2021-09-28 NOTE — ED PROVIDER NOTE - OBJECTIVE STATEMENT
54 y/o female with no significant PMHx presents to ED c/o abdominal pain. Patient states dysuria, suprapubic discomfort and nausea x yesterday. Patient symptoms consistent with previous episodes cystitis. Patient states last episode x3 weeks ago treated with Cipro. Patient denies vomiting, flank tenderness and fever. NKDA.

## 2021-09-28 NOTE — ED PROVIDER NOTE - NSFOLLOWUPINSTRUCTIONS_ED_ALL_ED_FT
Urinary Tract Infection in Women    AMBULATORY CARE:    A urinary tract infection (UTI) is caused by bacteria that get inside your urinary tract. Most bacteria that enter your urinary tract come out when you urinate. If the bacteria stay in your urinary tract, you may get an infection. Your urinary tract includes your kidneys, ureters, bladder, and urethra. Urine is made in your kidneys, and it flows from the ureters to the bladder. Urine leaves the bladder through the urethra. A UTI is more common in your lower urinary tract, which includes your bladder and urethra.     Female Urinary System         Common symptoms include the following:   •Urinating more often or waking from sleep to urinate      •Pain or burning when you urinate      •Pain or pressure in your lower abdomen       •Urine that smells bad      •Blood in your urine      •Leaking urine      Seek care immediately if:   •You are urinating very little or not at all.      •You have a high fever with shaking chills.       •You have side or back pain that gets worse.      Call your doctor if:   •You have a fever.      •You do not feel better after 2 days of taking antibiotics.      •You are vomiting.       •You have questions or concerns about your condition or care.       Treatment for a UTI may include antibiotics to treat a bacterial infection. You may also need medicines to decrease pain and burning, or decrease the urge to urinate often. If you have UTIs often (called recurrent UTIs), you may be given antibiotics to take regularly. You will be given directions for when and how to use antibiotics. The goal is to prevent UTIs but not cause antibiotic resistance by using antibiotics too often.    Prevent a UTI:   •Empty your bladder often. Urinate and empty your bladder as soon as you feel the need. Do not hold your urine for long periods of time.      •Wipe from front to back after you urinate or have a bowel movement. This will help prevent germs from getting into your urinary tract through your urethra.      •Drink liquids as directed. Ask how much liquid to drink each day and which liquids are best for you. You may need to drink more liquids than usual to help flush out the bacteria. Do not drink alcohol, caffeine, or citrus juices. These can irritate your bladder and increase your symptoms. Your healthcare provider may recommend cranberry juice to help prevent a UTI.      •Urinate after you have sex. This can help flush out bacteria passed during sex.      •Do not douche or use feminine deodorants. These can change the chemical balance in your vagina.      •Change sanitary pads or tampons often. This will help prevent germs from getting into your urinary tract.      •Talk to your healthcare provider about your birth control method. You may need to change your method if it is increasing your risk for UTIs.      •Wear cotton underwear and clothes that are loose. Tight pants and nylon underwear can trap moisture and cause bacteria to grow.      •Vaginal estrogen may be recommended. This medicine helps prevent UTIs in women who have gone through menopause or are in robson-menopause.      •Do pelvic muscle exercises often. Pelvic muscle exercises may help you start and stop urinating. Strong pelvic muscles may help you empty your bladder easier. Squeeze these muscles tightly for 5 seconds like you are trying to hold back urine. Then relax for 5 seconds. Gradually work up to squeezing for 10 seconds. Do 3 sets of 15 repetitions a day, or as directed.      Follow up with your doctor as directed: Write down your questions so you remember to ask them during your visits.

## 2021-09-28 NOTE — ED PROVIDER NOTE - PATIENT PORTAL LINK FT
You can access the FollowMyHealth Patient Portal offered by [facility] by registering at the following website: [email or web address]. By joining [facility]’s FollowMyHealth portal, you will also be able to view your health information using other applications (apps) compatible with our system.

## 2021-09-28 NOTE — ED PROVIDER NOTE - CLINICAL SUMMARY MEDICAL DECISION MAKING FREE TEXT BOX
Pt tolerated PO meds.  Rx Ceftin, pyridium and Zofran. pt will f/u with urologist Dr. Esteban.  Pt is well appearing, has no new complaints and able to walk with normal gait. Pt is stable for discharge and follow up with medical doctor. Pt educated on care and need for follow up. Discussed anticipatory guidance and return precautions. Questions answered. I had a detailed discussion with the patient and/or guardian regarding the historical points, exam findings, and any diagnostic results supporting the discharge diagnosis.

## 2021-09-29 ENCOUNTER — EMERGENCY (EMERGENCY)
Facility: HOSPITAL | Age: 56
LOS: 1 days | Discharge: HOME | End: 2021-09-29
Attending: EMERGENCY MEDICINE
Payer: MEDICARE

## 2021-09-29 VITALS
OXYGEN SATURATION: 95 % | RESPIRATION RATE: 18 BRPM | HEART RATE: 73 BPM | TEMPERATURE: 97 F | DIASTOLIC BLOOD PRESSURE: 74 MMHG | SYSTOLIC BLOOD PRESSURE: 123 MMHG

## 2021-09-29 VITALS
DIASTOLIC BLOOD PRESSURE: 101 MMHG | RESPIRATION RATE: 18 BRPM | TEMPERATURE: 98 F | HEIGHT: 66 IN | SYSTOLIC BLOOD PRESSURE: 148 MMHG | OXYGEN SATURATION: 99 % | HEART RATE: 79 BPM | WEIGHT: 176.37 LBS

## 2021-09-29 VITALS
DIASTOLIC BLOOD PRESSURE: 72 MMHG | HEART RATE: 77 BPM | SYSTOLIC BLOOD PRESSURE: 117 MMHG | TEMPERATURE: 98 F | RESPIRATION RATE: 18 BRPM | OXYGEN SATURATION: 99 %

## 2021-09-29 LAB
ALBUMIN SERPL ELPH-MCNC: 4 G/DL — SIGNIFICANT CHANGE UP (ref 3.5–5)
ALP SERPL-CCNC: 112 U/L — SIGNIFICANT CHANGE UP (ref 40–120)
ALT FLD-CCNC: 141 U/L DA — HIGH (ref 10–60)
ANION GAP SERPL CALC-SCNC: 3 MMOL/L — LOW (ref 5–17)
AST SERPL-CCNC: 42 U/L — HIGH (ref 10–40)
BASOPHILS # BLD AUTO: 0.04 K/UL — SIGNIFICANT CHANGE UP (ref 0–0.2)
BASOPHILS NFR BLD AUTO: 0.4 % — SIGNIFICANT CHANGE UP (ref 0–2)
BILIRUB SERPL-MCNC: 0.4 MG/DL — SIGNIFICANT CHANGE UP (ref 0.2–1.2)
BUN SERPL-MCNC: 19 MG/DL — HIGH (ref 7–18)
CALCIUM SERPL-MCNC: 9.3 MG/DL — SIGNIFICANT CHANGE UP (ref 8.4–10.5)
CHLORIDE SERPL-SCNC: 104 MMOL/L — SIGNIFICANT CHANGE UP (ref 96–108)
CO2 SERPL-SCNC: 32 MMOL/L — HIGH (ref 22–31)
CREAT SERPL-MCNC: 0.7 MG/DL — SIGNIFICANT CHANGE UP (ref 0.5–1.3)
EOSINOPHIL # BLD AUTO: 0.17 K/UL — SIGNIFICANT CHANGE UP (ref 0–0.5)
EOSINOPHIL NFR BLD AUTO: 1.8 % — SIGNIFICANT CHANGE UP (ref 0–6)
GLUCOSE SERPL-MCNC: 103 MG/DL — HIGH (ref 70–99)
HCT VFR BLD CALC: 35.7 % — SIGNIFICANT CHANGE UP (ref 34.5–45)
HGB BLD-MCNC: 11.8 G/DL — SIGNIFICANT CHANGE UP (ref 11.5–15.5)
IMM GRANULOCYTES NFR BLD AUTO: 0.3 % — SIGNIFICANT CHANGE UP (ref 0–1.5)
LACTATE SERPL-SCNC: 0.6 MMOL/L — LOW (ref 0.7–2)
LIDOCAIN IGE QN: 77 U/L — SIGNIFICANT CHANGE UP (ref 73–393)
LYMPHOCYTES # BLD AUTO: 2.38 K/UL — SIGNIFICANT CHANGE UP (ref 1–3.3)
LYMPHOCYTES # BLD AUTO: 24.9 % — SIGNIFICANT CHANGE UP (ref 13–44)
MCHC RBC-ENTMCNC: 29.6 PG — SIGNIFICANT CHANGE UP (ref 27–34)
MCHC RBC-ENTMCNC: 33.1 GM/DL — SIGNIFICANT CHANGE UP (ref 32–36)
MCV RBC AUTO: 89.7 FL — SIGNIFICANT CHANGE UP (ref 80–100)
MONOCYTES # BLD AUTO: 0.75 K/UL — SIGNIFICANT CHANGE UP (ref 0–0.9)
MONOCYTES NFR BLD AUTO: 7.8 % — SIGNIFICANT CHANGE UP (ref 2–14)
NEUTROPHILS # BLD AUTO: 6.2 K/UL — SIGNIFICANT CHANGE UP (ref 1.8–7.4)
NEUTROPHILS NFR BLD AUTO: 64.8 % — SIGNIFICANT CHANGE UP (ref 43–77)
NRBC # BLD: 0 /100 WBCS — SIGNIFICANT CHANGE UP (ref 0–0)
PLATELET # BLD AUTO: 240 K/UL — SIGNIFICANT CHANGE UP (ref 150–400)
POTASSIUM SERPL-MCNC: 4.6 MMOL/L — SIGNIFICANT CHANGE UP (ref 3.5–5.3)
POTASSIUM SERPL-SCNC: 4.6 MMOL/L — SIGNIFICANT CHANGE UP (ref 3.5–5.3)
PROT SERPL-MCNC: 8 G/DL — SIGNIFICANT CHANGE UP (ref 6–8.3)
RBC # BLD: 3.98 M/UL — SIGNIFICANT CHANGE UP (ref 3.8–5.2)
RBC # FLD: 12.4 % — SIGNIFICANT CHANGE UP (ref 10.3–14.5)
SARS-COV-2 RNA SPEC QL NAA+PROBE: SIGNIFICANT CHANGE UP
SODIUM SERPL-SCNC: 139 MMOL/L — SIGNIFICANT CHANGE UP (ref 135–145)
WBC # BLD: 9.57 K/UL — SIGNIFICANT CHANGE UP (ref 3.8–10.5)
WBC # FLD AUTO: 9.57 K/UL — SIGNIFICANT CHANGE UP (ref 3.8–10.5)

## 2021-09-29 PROCEDURE — 96374 THER/PROPH/DIAG INJ IV PUSH: CPT | Mod: XU

## 2021-09-29 PROCEDURE — 80053 COMPREHEN METABOLIC PANEL: CPT

## 2021-09-29 PROCEDURE — 99285 EMERGENCY DEPT VISIT HI MDM: CPT

## 2021-09-29 PROCEDURE — 87086 URINE CULTURE/COLONY COUNT: CPT

## 2021-09-29 PROCEDURE — 93010 ELECTROCARDIOGRAM REPORT: CPT

## 2021-09-29 PROCEDURE — 87635 SARS-COV-2 COVID-19 AMP PRB: CPT

## 2021-09-29 PROCEDURE — 74177 CT ABD & PELVIS W/CONTRAST: CPT | Mod: MA

## 2021-09-29 PROCEDURE — 74177 CT ABD & PELVIS W/CONTRAST: CPT | Mod: 26,MA

## 2021-09-29 PROCEDURE — 36415 COLL VENOUS BLD VENIPUNCTURE: CPT

## 2021-09-29 PROCEDURE — 83690 ASSAY OF LIPASE: CPT

## 2021-09-29 PROCEDURE — 99283 EMERGENCY DEPT VISIT LOW MDM: CPT

## 2021-09-29 PROCEDURE — 99284 EMERGENCY DEPT VISIT MOD MDM: CPT | Mod: 25

## 2021-09-29 PROCEDURE — 96361 HYDRATE IV INFUSION ADD-ON: CPT

## 2021-09-29 PROCEDURE — 87186 SC STD MICRODIL/AGAR DIL: CPT

## 2021-09-29 PROCEDURE — 81001 URINALYSIS AUTO W/SCOPE: CPT

## 2021-09-29 PROCEDURE — 93005 ELECTROCARDIOGRAM TRACING: CPT

## 2021-09-29 PROCEDURE — 96375 TX/PRO/DX INJ NEW DRUG ADDON: CPT

## 2021-09-29 PROCEDURE — 85025 COMPLETE CBC W/AUTO DIFF WBC: CPT

## 2021-09-29 PROCEDURE — 83605 ASSAY OF LACTIC ACID: CPT

## 2021-09-29 RX ORDER — MORPHINE SULFATE 50 MG/1
4 CAPSULE, EXTENDED RELEASE ORAL ONCE
Refills: 0 | Status: DISCONTINUED | OUTPATIENT
Start: 2021-09-29 | End: 2021-09-29

## 2021-09-29 RX ORDER — ACETAMINOPHEN WITH CODEINE 300MG-30MG
1 TABLET ORAL EVERY 4 HOURS
Refills: 0 | Status: DISCONTINUED | OUTPATIENT
Start: 2021-09-29 | End: 2021-09-29

## 2021-09-29 RX ORDER — ACETAMINOPHEN WITH CODEINE 300MG-30MG
1 TABLET ORAL
Qty: 10 | Refills: 0
Start: 2021-09-29

## 2021-09-29 RX ORDER — KETOROLAC TROMETHAMINE 30 MG/ML
30 SYRINGE (ML) INJECTION ONCE
Refills: 0 | Status: DISCONTINUED | OUTPATIENT
Start: 2021-09-29 | End: 2021-09-29

## 2021-09-29 RX ORDER — ONDANSETRON 8 MG/1
4 TABLET, FILM COATED ORAL ONCE
Refills: 0 | Status: COMPLETED | OUTPATIENT
Start: 2021-09-29 | End: 2021-09-29

## 2021-09-29 RX ORDER — SODIUM CHLORIDE 9 MG/ML
1000 INJECTION INTRAMUSCULAR; INTRAVENOUS; SUBCUTANEOUS ONCE
Refills: 0 | Status: COMPLETED | OUTPATIENT
Start: 2021-09-29 | End: 2021-09-29

## 2021-09-29 RX ADMIN — ONDANSETRON 4 MILLIGRAM(S): 8 TABLET, FILM COATED ORAL at 02:36

## 2021-09-29 RX ADMIN — SODIUM CHLORIDE 1000 MILLILITER(S): 9 INJECTION INTRAMUSCULAR; INTRAVENOUS; SUBCUTANEOUS at 03:36

## 2021-09-29 RX ADMIN — MORPHINE SULFATE 4 MILLIGRAM(S): 50 CAPSULE, EXTENDED RELEASE ORAL at 03:31

## 2021-09-29 RX ADMIN — Medication 1 TABLET(S): at 00:11

## 2021-09-29 RX ADMIN — SODIUM CHLORIDE 1000 MILLILITER(S): 9 INJECTION INTRAMUSCULAR; INTRAVENOUS; SUBCUTANEOUS at 02:36

## 2021-09-29 RX ADMIN — MORPHINE SULFATE 4 MILLIGRAM(S): 50 CAPSULE, EXTENDED RELEASE ORAL at 02:35

## 2021-09-29 NOTE — ED PROVIDER NOTE - NSFOLLOWUPINSTRUCTIONS_ED_ALL_ED_FT
Return to ER immediately if your abdominal pain does not improve, worsens, or persists, if you have fever, vomiting,  difficulty urinating, unable to eat or drink, have worsening/persistent diarrhea or constipation, any concerns. See your doctor as soon as possible (within 1-2 days).   If you need further assistance for appointments you can contact the [facility] Care Coordinator at [phone]. In addition our outpatient [facility] Clinic is located at [address] [place] [address], tele: [phone].

## 2021-09-29 NOTE — ED PROVIDER NOTE - PATIENT PORTAL LINK FT
You can access the FollowMyHealth Patient Portal offered by [facility] by registering at the following website: [email or web address]. By joining [employer]’s FollowMyHealth portal, you will also be able to view your health information using other applications (apps) compatible with our system.

## 2021-09-29 NOTE — ED ADULT NURSE NOTE - OBJECTIVE STATEMENT
Patient presents to ED complaining of abdominal pain. States she has had pain for a few days. Denies nausea, vomiting. She is alert and oriented x3.

## 2021-09-29 NOTE — ED PROVIDER NOTE - OBJECTIVE STATEMENT
54 y/o female with no significant PMHx or PSHx presents to ED c/o abdominal pain. Patient seen in ED earlier and diagnosed with cystitis. Patient notes persistent dysuria and suprapubic tenderness. Patient states returned to ED because of lower abdominal tenderness. No other known complaints. NKDA. 56 y/o female with no significant PMHx or PSHx presents to ED c/o abdominal pain. Patient seen in ED earlier and diagnosed with cystitis. Patient notes persistent dysuria and suprapubic tenderness. Patient states returned to ED because of lower abdominal tenderness. No other known complaints. NKDA.    Pshx: left oophorectomy, right ovarian cystectomy

## 2021-09-29 NOTE — ED PROVIDER NOTE - CLINICAL SUMMARY MEDICAL DECISION MAKING FREE TEXT BOX
56 y/o female with suprapubic tenderness, will follow up with diagnostics, will provide analgesia, received 500 mg Ceftin on additional visit. 54 y/o female with suprapubic tenderness, will follow up with diagnostics, will provide analgesia, received 500 mg Ceftin on earlier visit.  CT reported Mild urothelial thickening in the right renal pelvis and proximal right ureter is compatible with pyelitis and ureteritis from and ascending urinary tract infection.  No CT evidence of pyelonephritis.  No hydronephrosis or renal stones.  530a- Pt feels better,  no guarding to repeat abdominal palpation, able to eat and drink without vomiting.   Pt is well appearing, has no new complaints and able to walk with normal gait. Pt is stable for discharge and follow up with medical doctor. Pt educated on care and need for follow up. Discussed anticipatory guidance and return precautions. Questions answered. I had a detailed discussion with the patient and/or guardian regarding the historical points, exam findings, and any diagnostic results supporting the discharge diagnosis.

## 2021-09-29 NOTE — ED PROVIDER NOTE - CARE PROVIDER_API CALL
He Esteban  UROLOGY  [address] [place] [address]  Phone: [phone]  Fax: [phone]  Follow Up Time:

## 2021-10-04 ENCOUNTER — OUTPATIENT (OUTPATIENT)
Dept: OUTPATIENT SERVICES | Facility: HOSPITAL | Age: 56
LOS: 1 days | End: 2021-10-04
Payer: MEDICARE

## 2021-10-04 DIAGNOSIS — A37.81: ICD-10-CM

## 2021-10-04 PROCEDURE — 71046 X-RAY EXAM CHEST 2 VIEWS: CPT

## 2021-10-04 PROCEDURE — 71046 X-RAY EXAM CHEST 2 VIEWS: CPT | Mod: 26

## 2021-11-03 ENCOUNTER — APPOINTMENT (OUTPATIENT)
Dept: NEPHROLOGY | Facility: CLINIC | Age: 56
End: 2021-11-03

## 2022-02-13 ENCOUNTER — INPATIENT (INPATIENT)
Facility: HOSPITAL | Age: 57
LOS: 0 days | Discharge: LEFT BEFORE DISCHARGE | DRG: 690 | End: 2022-02-14
Attending: STUDENT IN AN ORGANIZED HEALTH CARE EDUCATION/TRAINING PROGRAM | Admitting: STUDENT IN AN ORGANIZED HEALTH CARE EDUCATION/TRAINING PROGRAM
Payer: MEDICARE

## 2022-02-13 VITALS
DIASTOLIC BLOOD PRESSURE: 72 MMHG | HEIGHT: 66 IN | HEART RATE: 81 BPM | RESPIRATION RATE: 16 BRPM | TEMPERATURE: 98 F | SYSTOLIC BLOOD PRESSURE: 136 MMHG | OXYGEN SATURATION: 98 % | WEIGHT: 177.91 LBS

## 2022-02-13 DIAGNOSIS — N39.0 URINARY TRACT INFECTION, SITE NOT SPECIFIED: ICD-10-CM

## 2022-02-13 LAB
ALBUMIN SERPL ELPH-MCNC: 4.3 G/DL — SIGNIFICANT CHANGE UP (ref 3.5–5)
ALP SERPL-CCNC: 83 U/L — SIGNIFICANT CHANGE UP (ref 40–120)
ALT FLD-CCNC: 60 U/L DA — SIGNIFICANT CHANGE UP (ref 10–60)
ANION GAP SERPL CALC-SCNC: 4 MMOL/L — LOW (ref 5–17)
APPEARANCE UR: CLEAR — SIGNIFICANT CHANGE UP
AST SERPL-CCNC: 28 U/L — SIGNIFICANT CHANGE UP (ref 10–40)
BACTERIA # UR AUTO: ABNORMAL /HPF
BASOPHILS # BLD AUTO: 0.05 K/UL — SIGNIFICANT CHANGE UP (ref 0–0.2)
BASOPHILS NFR BLD AUTO: 0.4 % — SIGNIFICANT CHANGE UP (ref 0–2)
BILIRUB SERPL-MCNC: 0.3 MG/DL — SIGNIFICANT CHANGE UP (ref 0.2–1.2)
BILIRUB UR-MCNC: NEGATIVE — SIGNIFICANT CHANGE UP
BUN SERPL-MCNC: 19 MG/DL — HIGH (ref 7–18)
CALCIUM SERPL-MCNC: 9.5 MG/DL — SIGNIFICANT CHANGE UP (ref 8.4–10.5)
CHLORIDE SERPL-SCNC: 106 MMOL/L — SIGNIFICANT CHANGE UP (ref 96–108)
CO2 SERPL-SCNC: 27 MMOL/L — SIGNIFICANT CHANGE UP (ref 22–31)
COLOR SPEC: YELLOW — SIGNIFICANT CHANGE UP
CREAT SERPL-MCNC: 0.94 MG/DL — SIGNIFICANT CHANGE UP (ref 0.5–1.3)
DIFF PNL FLD: ABNORMAL
EOSINOPHIL # BLD AUTO: 0.19 K/UL — SIGNIFICANT CHANGE UP (ref 0–0.5)
EOSINOPHIL NFR BLD AUTO: 1.5 % — SIGNIFICANT CHANGE UP (ref 0–6)
EPI CELLS # UR: ABNORMAL /HPF
GLUCOSE SERPL-MCNC: 103 MG/DL — HIGH (ref 70–99)
GLUCOSE UR QL: NEGATIVE — SIGNIFICANT CHANGE UP
HCT VFR BLD CALC: 36.2 % — SIGNIFICANT CHANGE UP (ref 34.5–45)
HGB BLD-MCNC: 12.2 G/DL — SIGNIFICANT CHANGE UP (ref 11.5–15.5)
HYALINE CASTS # UR AUTO: ABNORMAL /LPF
IMM GRANULOCYTES NFR BLD AUTO: 0.3 % — SIGNIFICANT CHANGE UP (ref 0–1.5)
KETONES UR-MCNC: NEGATIVE — SIGNIFICANT CHANGE UP
LEUKOCYTE ESTERASE UR-ACNC: ABNORMAL
LYMPHOCYTES # BLD AUTO: 19.8 % — SIGNIFICANT CHANGE UP (ref 13–44)
LYMPHOCYTES # BLD AUTO: 2.56 K/UL — SIGNIFICANT CHANGE UP (ref 1–3.3)
MCHC RBC-ENTMCNC: 30.1 PG — SIGNIFICANT CHANGE UP (ref 27–34)
MCHC RBC-ENTMCNC: 33.7 GM/DL — SIGNIFICANT CHANGE UP (ref 32–36)
MCV RBC AUTO: 89.4 FL — SIGNIFICANT CHANGE UP (ref 80–100)
MONOCYTES # BLD AUTO: 1.04 K/UL — HIGH (ref 0–0.9)
MONOCYTES NFR BLD AUTO: 8 % — SIGNIFICANT CHANGE UP (ref 2–14)
NEUTROPHILS # BLD AUTO: 9.07 K/UL — HIGH (ref 1.8–7.4)
NEUTROPHILS NFR BLD AUTO: 70 % — SIGNIFICANT CHANGE UP (ref 43–77)
NITRITE UR-MCNC: NEGATIVE — SIGNIFICANT CHANGE UP
NRBC # BLD: 0 /100 WBCS — SIGNIFICANT CHANGE UP (ref 0–0)
PH UR: 6 — SIGNIFICANT CHANGE UP (ref 5–8)
PLATELET # BLD AUTO: 260 K/UL — SIGNIFICANT CHANGE UP (ref 150–400)
POTASSIUM SERPL-MCNC: 4 MMOL/L — SIGNIFICANT CHANGE UP (ref 3.5–5.3)
POTASSIUM SERPL-SCNC: 4 MMOL/L — SIGNIFICANT CHANGE UP (ref 3.5–5.3)
PROT SERPL-MCNC: 8.1 G/DL — SIGNIFICANT CHANGE UP (ref 6–8.3)
PROT UR-MCNC: 30 MG/DL
RBC # BLD: 4.05 M/UL — SIGNIFICANT CHANGE UP (ref 3.8–5.2)
RBC # FLD: 12.4 % — SIGNIFICANT CHANGE UP (ref 10.3–14.5)
RBC CASTS # UR COMP ASSIST: ABNORMAL /HPF (ref 0–2)
SARS-COV-2 RNA SPEC QL NAA+PROBE: SIGNIFICANT CHANGE UP
SODIUM SERPL-SCNC: 137 MMOL/L — SIGNIFICANT CHANGE UP (ref 135–145)
SP GR SPEC: 1 — LOW (ref 1.01–1.02)
UROBILINOGEN FLD QL: NEGATIVE — SIGNIFICANT CHANGE UP
WBC # BLD: 12.95 K/UL — HIGH (ref 3.8–10.5)
WBC # FLD AUTO: 12.95 K/UL — HIGH (ref 3.8–10.5)
WBC UR QL: ABNORMAL /HPF (ref 0–5)

## 2022-02-13 PROCEDURE — 74177 CT ABD & PELVIS W/CONTRAST: CPT | Mod: 26,MA

## 2022-02-13 PROCEDURE — 99285 EMERGENCY DEPT VISIT HI MDM: CPT

## 2022-02-13 RX ORDER — KETOROLAC TROMETHAMINE 30 MG/ML
30 SYRINGE (ML) INJECTION ONCE
Refills: 0 | Status: DISCONTINUED | OUTPATIENT
Start: 2022-02-13 | End: 2022-02-13

## 2022-02-13 RX ORDER — SODIUM CHLORIDE 9 MG/ML
3 INJECTION INTRAMUSCULAR; INTRAVENOUS; SUBCUTANEOUS ONCE
Refills: 0 | Status: COMPLETED | OUTPATIENT
Start: 2022-02-13 | End: 2022-02-13

## 2022-02-13 RX ORDER — SODIUM CHLORIDE 9 MG/ML
1000 INJECTION INTRAMUSCULAR; INTRAVENOUS; SUBCUTANEOUS
Refills: 0 | Status: DISCONTINUED | OUTPATIENT
Start: 2022-02-13 | End: 2022-02-14

## 2022-02-13 RX ORDER — PANTOPRAZOLE SODIUM 20 MG/1
40 TABLET, DELAYED RELEASE ORAL ONCE
Refills: 0 | Status: COMPLETED | OUTPATIENT
Start: 2022-02-13 | End: 2022-02-13

## 2022-02-13 RX ORDER — CEFTRIAXONE 500 MG/1
1000 INJECTION, POWDER, FOR SOLUTION INTRAMUSCULAR; INTRAVENOUS ONCE
Refills: 0 | Status: COMPLETED | OUTPATIENT
Start: 2022-02-13 | End: 2022-02-13

## 2022-02-13 RX ORDER — ONDANSETRON 8 MG/1
4 TABLET, FILM COATED ORAL ONCE
Refills: 0 | Status: COMPLETED | OUTPATIENT
Start: 2022-02-13 | End: 2022-02-13

## 2022-02-13 RX ORDER — MORPHINE SULFATE 50 MG/1
4 CAPSULE, EXTENDED RELEASE ORAL ONCE
Refills: 0 | Status: DISCONTINUED | OUTPATIENT
Start: 2022-02-13 | End: 2022-02-13

## 2022-02-13 RX ORDER — MORPHINE SULFATE 50 MG/1
2 CAPSULE, EXTENDED RELEASE ORAL ONCE
Refills: 0 | Status: DISCONTINUED | OUTPATIENT
Start: 2022-02-13 | End: 2022-02-13

## 2022-02-13 RX ADMIN — SODIUM CHLORIDE 3 MILLILITER(S): 9 INJECTION INTRAMUSCULAR; INTRAVENOUS; SUBCUTANEOUS at 20:07

## 2022-02-13 RX ADMIN — Medication 30 MILLIGRAM(S): at 20:37

## 2022-02-13 RX ADMIN — MORPHINE SULFATE 2 MILLIGRAM(S): 50 CAPSULE, EXTENDED RELEASE ORAL at 20:06

## 2022-02-13 RX ADMIN — MORPHINE SULFATE 4 MILLIGRAM(S): 50 CAPSULE, EXTENDED RELEASE ORAL at 20:38

## 2022-02-13 RX ADMIN — Medication 30 MILLIGRAM(S): at 20:06

## 2022-02-13 RX ADMIN — SODIUM CHLORIDE 1000 MILLILITER(S): 9 INJECTION INTRAMUSCULAR; INTRAVENOUS; SUBCUTANEOUS at 21:47

## 2022-02-13 RX ADMIN — ONDANSETRON 4 MILLIGRAM(S): 8 TABLET, FILM COATED ORAL at 20:06

## 2022-02-13 RX ADMIN — MORPHINE SULFATE 2 MILLIGRAM(S): 50 CAPSULE, EXTENDED RELEASE ORAL at 20:37

## 2022-02-13 RX ADMIN — MORPHINE SULFATE 4 MILLIGRAM(S): 50 CAPSULE, EXTENDED RELEASE ORAL at 20:58

## 2022-02-13 RX ADMIN — CEFTRIAXONE 100 MILLIGRAM(S): 500 INJECTION, POWDER, FOR SOLUTION INTRAMUSCULAR; INTRAVENOUS at 20:25

## 2022-02-13 RX ADMIN — ONDANSETRON 4 MILLIGRAM(S): 8 TABLET, FILM COATED ORAL at 21:43

## 2022-02-13 RX ADMIN — CEFTRIAXONE 1000 MILLIGRAM(S): 500 INJECTION, POWDER, FOR SOLUTION INTRAMUSCULAR; INTRAVENOUS at 21:01

## 2022-02-13 NOTE — ED ADULT NURSE REASSESSMENT NOTE - NS ED NURSE REASSESS COMMENT FT1
c/o nausea and lower abdominal pain ,Dr. Duran aware .Color pale . Abdomen soft with suprapubic tenderness .Alert and oriented x3 . aware of repeat VS .

## 2022-02-13 NOTE — ED PROVIDER NOTE - OBJECTIVE STATEMENT
56 year old female with a PHMx of UTI, pyelitis, DVT, HLD, and vertigo presents to the ED with complaints of suprapubic pain that started all of a sudden at 17:00 today. Endorses nausea. Patient reports she on multiple medications for  problems. States she had an attack previously in September and had a CT scan done that showed evidence of pyelitis.   NKDA.

## 2022-02-13 NOTE — ED ADULT NURSE NOTE - OBJECTIVE STATEMENT
pt is a 57 y/o  female  c/o  urinary symptoms pt alert oriented x 3  pt w/ lower abdominal pain and states  she has  pain when she  urinates.

## 2022-02-13 NOTE — ED PROVIDER NOTE - NSICDXPASTMEDICALHX_GEN_ALL_CORE_FT
PAST MEDICAL HISTORY:  DVT (deep venous thrombosis)     High cholesterol     Vertigo       Pyelitis     Urinary tract infection

## 2022-02-13 NOTE — ED PROVIDER NOTE - CARE PLAN
1 Principal Discharge DX:	Acute UTI  Secondary Diagnosis:	Pain pelvic  Secondary Diagnosis:	Nausea and vomiting

## 2022-02-13 NOTE — ED PROVIDER NOTE - PROGRESS NOTE DETAILS
ct scan is negative still symptomatic will admit for uti patient in severe pain will obtain ct scan wbc is elevated urine culture from previous visit showed e coli sensitive to rocephin  patient given more morphine and several doses of zofran patient having stable vital signs no indication for code sepsis but cannot go home

## 2022-02-13 NOTE — ED PROVIDER NOTE - DATE/TIME 3
pt stable alert in NAD  no new complaints    HOMICIDAL IDEATION  ^HOMICIDAL IDEATION  No pertinent family history in first degree relatives  Handoff  Sickle cell anemia  Anemia  Substance abuse  Schizophrenia  Schizophrenia, unspecified type  Type 2 diabetes mellitus with other neurologic complication, without long-term current use of insulin  Essential hypertension  Schizophrenia  No significant past surgical history    HEALTH ISSUES - PROBLEM Dx:  Schizophrenia, unspecified type: Schizophrenia, unspecified type  Type 2 diabetes mellitus with other neurologic complication, without long-term current use of insulin: Type 2 diabetes mellitus with other neurologic complication, without long-term current use of insulin  Essential hypertension: Essential hypertension  Schizophrenia: Schizophrenia        PAST MEDICAL & SURGICAL HISTORY:  Sickle cell anemia  Anemia  Substance abuse  Schizophrenia  No significant past surgical history    No Known Allergies      FAMILY HISTORY:  No pertinent family history in first degree relatives      atorvastatin 10 milliGRAM(s) Oral at bedtime  benzocaine 15 mG/menthol 3.6 mG Lozenge 1 Lozenge Oral three times a day  benztropine 1 milliGRAM(s) Oral two times a day  diphenhydrAMINE 50 milliGRAM(s) Oral at bedtime PRN  haloperidol     Tablet 10 milliGRAM(s) Oral at bedtime  haloperidol     Tablet 5 milliGRAM(s) Oral daily  hydrOXYzine hydrochloride 25 milliGRAM(s) Oral every 8 hours PRN  lisinopril 2.5 milliGRAM(s) Oral daily  metFORMIN 500 milliGRAM(s) Oral two times a day  nicotine  Polacrilex Gum 2 milliGRAM(s) Oral every 3 hours PRN      T(C): 37.1 (06-28-19 @ 06:27), Max: 37.1 (06-28-19 @ 06:27)  HR: 67 (06-28-19 @ 06:27) (67 - 99)  BP: 116/56 (06-28-19 @ 06:27) (116/56 - 151/75)  RR: 16 (06-28-19 @ 06:27) (16 - 18)  SpO2: --    PE;  general:  no acute changes in nad    Lungs:    Heart:    EXT:    Neuro:  no deficitsw      9.9  31.0  5.50  --  --  --  --  --  --  --  12  0.80  4.1  130      06-26    131<L>  |  93<L>  |  12  ----------------------------<  130<H>  4.1   |  27  |  0.80    Ca    9.4      26 Jun 2019 12:10    TPro  7.5  /  Alb  3.8  /  TBili  < 0.2<L>  /  DBili  x   /  AST  13  /  ALT  11  /  AlkPhos  80  06-26      LIVER FUNCTIONS - ( 26 Jun 2019 12:10 )  Alb: 3.8 g/dL / Pro: 7.5 g/dL / ALK PHOS: 80 u/L / ALT: 11 u/L / AST: 13 u/L / GGT: x                                   9.9    5.50  )-----------( 314      ( 26 Jun 2019 12:31 )             31.0       CAPILLARY BLOOD GLUCOSE 13-Feb-2022 22:56

## 2022-02-13 NOTE — ED PROVIDER NOTE - CLINICAL SUMMARY MEDICAL DECISION MAKING FREE TEXT BOX
Plan UA, urine culture, and repeat CT scan with IV contrast, labs, CBC, and CMP.   Urine culture done in September shows sensitivity to Reserpine.

## 2022-02-13 NOTE — ED ADULT NURSE NOTE - ED STAT RN HANDOFF DETAILS
Transferred to Main ED , reports given to Ophelia VÁZQUEZ ,Color pale , aware .IV NS infusing well , no redness ,swelling noted heplock site .Abdomen soft with suprapubic tenderness , aware .

## 2022-02-13 NOTE — ED ADULT NURSE REASSESSMENT NOTE - NS ED NURSE REASSESS COMMENT FT1
21:42pm .Back from CT Scan c/o nausea , aware ,zofran IVP given as ordered .IV NS started ,no redness ,swelling noted IV site .Denies pain .

## 2022-02-14 ENCOUNTER — TRANSCRIPTION ENCOUNTER (OUTPATIENT)
Age: 57
End: 2022-02-14

## 2022-02-14 VITALS
HEART RATE: 64 BPM | DIASTOLIC BLOOD PRESSURE: 70 MMHG | TEMPERATURE: 98 F | OXYGEN SATURATION: 96 % | SYSTOLIC BLOOD PRESSURE: 105 MMHG | RESPIRATION RATE: 18 BRPM

## 2022-02-14 DIAGNOSIS — N39.0 URINARY TRACT INFECTION, SITE NOT SPECIFIED: ICD-10-CM

## 2022-02-14 DIAGNOSIS — R10.9 UNSPECIFIED ABDOMINAL PAIN: ICD-10-CM

## 2022-02-14 DIAGNOSIS — Z29.9 ENCOUNTER FOR PROPHYLACTIC MEASURES, UNSPECIFIED: ICD-10-CM

## 2022-02-14 DIAGNOSIS — K21.9 GASTRO-ESOPHAGEAL REFLUX DISEASE WITHOUT ESOPHAGITIS: ICD-10-CM

## 2022-02-14 DIAGNOSIS — R07.9 CHEST PAIN, UNSPECIFIED: ICD-10-CM

## 2022-02-14 LAB — TROPONIN I, HIGH SENSITIVITY RESULT: 3.9 NG/L — SIGNIFICANT CHANGE UP

## 2022-02-14 PROCEDURE — 96376 TX/PRO/DX INJ SAME DRUG ADON: CPT

## 2022-02-14 PROCEDURE — 80053 COMPREHEN METABOLIC PANEL: CPT

## 2022-02-14 PROCEDURE — 85025 COMPLETE CBC W/AUTO DIFF WBC: CPT

## 2022-02-14 PROCEDURE — 82962 GLUCOSE BLOOD TEST: CPT

## 2022-02-14 PROCEDURE — 96374 THER/PROPH/DIAG INJ IV PUSH: CPT | Mod: XU

## 2022-02-14 PROCEDURE — 87186 SC STD MICRODIL/AGAR DIL: CPT

## 2022-02-14 PROCEDURE — 87086 URINE CULTURE/COLONY COUNT: CPT

## 2022-02-14 PROCEDURE — 93005 ELECTROCARDIOGRAM TRACING: CPT

## 2022-02-14 PROCEDURE — 93010 ELECTROCARDIOGRAM REPORT: CPT

## 2022-02-14 PROCEDURE — 74177 CT ABD & PELVIS W/CONTRAST: CPT | Mod: MA

## 2022-02-14 PROCEDURE — 99223 1ST HOSP IP/OBS HIGH 75: CPT | Mod: GC,AI

## 2022-02-14 PROCEDURE — 36415 COLL VENOUS BLD VENIPUNCTURE: CPT

## 2022-02-14 PROCEDURE — 87635 SARS-COV-2 COVID-19 AMP PRB: CPT

## 2022-02-14 PROCEDURE — 99285 EMERGENCY DEPT VISIT HI MDM: CPT | Mod: 25

## 2022-02-14 PROCEDURE — 96375 TX/PRO/DX INJ NEW DRUG ADDON: CPT

## 2022-02-14 PROCEDURE — 81001 URINALYSIS AUTO W/SCOPE: CPT

## 2022-02-14 PROCEDURE — 84484 ASSAY OF TROPONIN QUANT: CPT

## 2022-02-14 RX ORDER — CEFTRIAXONE 500 MG/1
1000 INJECTION, POWDER, FOR SOLUTION INTRAMUSCULAR; INTRAVENOUS EVERY 24 HOURS
Refills: 0 | Status: DISCONTINUED | OUTPATIENT
Start: 2022-02-14 | End: 2022-02-14

## 2022-02-14 RX ORDER — SIMETHICONE 80 MG/1
80 TABLET, CHEWABLE ORAL EVERY 6 HOURS
Refills: 0 | Status: DISCONTINUED | OUTPATIENT
Start: 2022-02-14 | End: 2022-02-14

## 2022-02-14 RX ORDER — ENOXAPARIN SODIUM 100 MG/ML
40 INJECTION SUBCUTANEOUS DAILY
Refills: 0 | Status: DISCONTINUED | OUTPATIENT
Start: 2022-02-14 | End: 2022-02-14

## 2022-02-14 RX ORDER — ACETAMINOPHEN 500 MG
1000 TABLET ORAL ONCE
Refills: 0 | Status: DISCONTINUED | OUTPATIENT
Start: 2022-02-14 | End: 2022-02-14

## 2022-02-14 RX ORDER — SIMETHICONE 80 MG/1
1 TABLET, CHEWABLE ORAL
Qty: 40 | Refills: 0
Start: 2022-02-14 | End: 2022-02-23

## 2022-02-14 RX ORDER — METOCLOPRAMIDE HCL 10 MG
10 TABLET ORAL ONCE
Refills: 0 | Status: COMPLETED | OUTPATIENT
Start: 2022-02-14 | End: 2022-02-14

## 2022-02-14 RX ORDER — ATORVASTATIN CALCIUM 80 MG/1
20 TABLET, FILM COATED ORAL AT BEDTIME
Refills: 0 | Status: DISCONTINUED | OUTPATIENT
Start: 2022-02-14 | End: 2022-02-14

## 2022-02-14 RX ORDER — CEFUROXIME AXETIL 250 MG
1 TABLET ORAL
Qty: 12 | Refills: 0
Start: 2022-02-14 | End: 2022-02-19

## 2022-02-14 RX ORDER — MECLIZINE HCL 12.5 MG
25 TABLET ORAL THREE TIMES A DAY
Refills: 0 | Status: DISCONTINUED | OUTPATIENT
Start: 2022-02-14 | End: 2022-02-14

## 2022-02-14 RX ADMIN — PANTOPRAZOLE SODIUM 40 MILLIGRAM(S): 20 TABLET, DELAYED RELEASE ORAL at 00:23

## 2022-02-14 RX ADMIN — Medication 10 MILLIGRAM(S): at 01:13

## 2022-02-14 RX ADMIN — SODIUM CHLORIDE 1000 MILLILITER(S): 9 INJECTION INTRAMUSCULAR; INTRAVENOUS; SUBCUTANEOUS at 00:21

## 2022-02-14 NOTE — H&P ADULT - ASSESSMENT
Patient is 56 years old female with past medical history of pyelitis (Sep - on Sulfa/Trim for 3 months), GERD, UTI (E.Coli), DVT on left thigh 2004 completed coumdain for one year, who came today due to urinary symptoms. Patient states that she had UTI on Sept but she still have symptoms of dysuria and inc frequency. Patient states that she has lower mid abdominal pain which is persistently there since previous admission. CT abdomen and Pelvis didn't show signs of pyelnophritis. UA is positive. Patient had left side chest pain. EKG is normal. Patient will be admitted for urinary symptoms / Abdominal pain.

## 2022-02-14 NOTE — CHART NOTE - NSCHARTNOTEFT_GEN_A_CORE
Patient is AAOx3. She asked to go home and son as well at bedside. Risk was explained for leaving against medical advice. Patient understood the whole discussion. She still want to leave AMA. Paperwork was signed and patient will go home with her son.

## 2022-02-14 NOTE — H&P ADULT - NSHPPHYSICALEXAM_GEN_ALL_CORE
ICU Vital Signs Last 24 Hrs  T(C): 36.4 (13 Feb 2022 23:22), Max: 36.7 (13 Feb 2022 19:18)  T(F): 97.6 (13 Feb 2022 23:22), Max: 98 (13 Feb 2022 19:18)  HR: 76 (13 Feb 2022 23:22) (76 - 81)  BP: 125/72 (13 Feb 2022 23:22) (125/72 - 136/72)  RR: 19 (13 Feb 2022 23:22) (16 - 19)  SpO2: 97% (13 Feb 2022 23:22) (96% - 98%)    GENERAL: NAD, lying in bed, AAOx3, in moderate distress   HEAD:  Atraumatic, Normocephalic  EYES: EOMI, PERRLA, conjunctiva and sclera clear  ENT: Moist mucous membranes  NECK: Supple, No JVD  CHEST/LUNG: Left side chest pain, likely muscle pain. Clear to auscultation bilaterally; No rales, rhonchi, wheezing, or rubs. Unlabored respirations  HEART: Regular rate and rhythm; No murmurs, rubs, or gallops  ABDOMEN: Lower mid abdominal pain  EXTREMITIES:  2+ Peripheral Pulses, brisk capillary refill. No clubbing, cyanosis, or edema  NERVOUS SYSTEM:  Alert & Oriented X3, speech clear. No deficits   MSK: FROM all 4 extremities, full and equal strength  SKIN: No rashes or lesions

## 2022-02-14 NOTE — DISCHARGE NOTE PROVIDER - HOSPITAL COURSE
Patient is 56 years old female with past medical history of pyelitis (Sep - on Sulfa/Trim for 3 months), GERD, UTI (E.Coli), DVT on left thigh 2004 completed coumdain for one year, who came today due to urinary symptoms. Patient states that she had UTI on Sept but she still have symptoms of dysuria and inc frequency. Patient states that she has lower mid abdominal pain which is persistently there since previous admission. On previous admission, Patient got 4 mg IV morphine and then she got nauseated. Today, ED attending gave her 2mg Morphine and then another 4mg morphine IV which makes her son very upset as she feel nauseated but worse than before. On Bedside, Patient was minimally arousable, complains of nausea and left side chest pain. EKG was done and it was normal. Her chest pain is on left side, hotness feeling, which improve when putting Ice pack on that area. Further exam, Patient denied shortness of breath, dizziness, confusion, fever, Vomiting or change in bowel movement.

## 2022-02-14 NOTE — DISCHARGE NOTE PROVIDER - NSDCMRMEDTOKEN_GEN_ALL_CORE_FT
acetaminophen-codeine 300 mg-15 mg oral tablet: 1 tab(s) orally every 8 hours, As Needed MDD:3  Carafate 1 g/10 mL oral suspension: 10 milliliter(s) orally every 8 hours as needed.  cefuroxime 500 mg oral tablet: 1 tab(s) orally every 12 hours   cyclobenzaprine 10 mg oral tablet: 1 tab(s) orally every 8 hours - for muscle spasm  meclizine 25 mg oral tablet: 1 tab(s) orally 3 times a day, As Needed -for dizziness   meclizine 25 mg oral tablet: 1 tab(s) orally every 8 hours, As Needed -for dizziness   omeprazole:  orally   Percocet 5/325 oral tablet: 1 tab(s) orally every 4 hours MDD:6 tabs  simethicone 80 mg oral tablet, chewable: 1 tab(s) orally every 6 hours, As needed, Gas  simvastatin:  orally

## 2022-02-14 NOTE — H&P ADULT - NSICDXPASTMEDICALHX_GEN_ALL_CORE_FT
PAST MEDICAL HISTORY:  DVT (deep venous thrombosis)     High cholesterol     Pyelitis     Urinary tract infection     Vertigo

## 2022-02-14 NOTE — H&P ADULT - NSHPREVIEWOFSYSTEMS_GEN_ALL_CORE
REVIEW OF SYSTEMS:  CONSTITUTIONAL: Weakness   EYES/ENT: No visual changes;  No vertigo or throat pain   RESPIRATORY: No cough, wheezing, hemoptysis; No shortness of breath  CARDIOVASCULAR: Left side chest pain   GASTROINTESTINAL: Lower mid abdominal pain   GENITOURINARY: No dysuria, frequency or hematuria  NEUROLOGICAL: No numbness or weakness  SKIN: No itching, rashes

## 2022-02-14 NOTE — DISCHARGE NOTE PROVIDER - NSDCPNSUBOBJ_GEN_ALL_CORE
Patient is a 56 years old female with PMH of, recurrent UTI, ( last urine cx pansensitive E coli),  pyelitis (Sep - on Sulfa/Trim for 3 months), GERD, h/o DVT on left thigh 2004 completed coumadin for one year, h/o ovarian cyst removal; p/w c/o suprapubic tenderness. Noted to have wbc 12 k, but other afebrile, HD stable, Labs unremarkable beside positive UA, CT abd pelvis w/ iv contrast negative for acute pyelonephritis, is being admitted to medicine service 2/2  intractable nausea that patient reports developed after receiving morphine ( 6 mg ) in ED. Admitted for UTI and nausea. Patient reports feeling better this morning, with reglan, EKG done negative for acute ischemic changes, trop x 1 negative suprapubic pain. Patient reports symptomatic improvement and requested to go home, risk and benefits were explained to patient of leaving against medical advise, patient AAOx3, understood risk and benefits and decided to leave AMA. Patient being discharged on po ceftin x to complete 1 week of abx and follow up with pcp with urine cx.

## 2022-02-14 NOTE — DISCHARGE NOTE PROVIDER - NSDCCPCAREPLAN_GEN_ALL_CORE_FT
PRINCIPAL DISCHARGE DIAGNOSIS  Diagnosis: UTI (urinary tract infection)  Assessment and Plan of Treatment: You presented to ED with lower abdominal pain and found to have UTI like before. You were started on Rocephin to complete 7 days of antibiotics in case you have ascending infection to your kidney. You refused to satau and you asked to go home. Please take Ceftin for 6 more days every 12 hours and follow up with your doctor      SECONDARY DISCHARGE DIAGNOSES  Diagnosis: Chest pain  Assessment and Plan of Treatment: You were complaining of chest pain. EKG was normal. Troponin enzyme was normal level as well. You were supposed to be admitted for echocardiography but you refused to stay and asked to go home. Please have echo done outpatient with your doctor.    Diagnosis: Abdominal pain  Assessment and Plan of Treatment: Yoour abdominal pain is not new complain so likely due to cystitis. You got 6mg IV morphine which controlled your pain but made you nauseated. Zofran and reglan were given and your symptoms improved. Please continue on your medications as prescribed and follow up with your PCP.

## 2022-02-14 NOTE — ED ADULT NURSE REASSESSMENT NOTE - NS ED NURSE REASSESS COMMENT FT1
Pt is alert and oriented x3. Pt and family member request to go home. Dr. Shane made aware. Dr. Shane spoke to pt/family. Pt decided to leave AMA. AMA form signed. Pt accompanied home by family. Pt left ED in stable condition.

## 2022-02-14 NOTE — DISCHARGE NOTE NURSING/CASE MANAGEMENT/SOCIAL WORK - NSDCPEFALRISK_GEN_ALL_CORE
For information on Fall & Injury Prevention, visit: [email or web address] OR  [email or web address] OR  https://www.cdc.gov/steadi/patient.html

## 2022-02-14 NOTE — H&P ADULT - ATTENDING COMMENTS
Patient seen and examined at bedside.    Vital Signs Last 24 Hrs  T(C): 36.4 (13 Feb 2022 23:22), Max: 36.7 (13 Feb 2022 19:18)  T(F): 97.6 (13 Feb 2022 23:22), Max: 98 (13 Feb 2022 19:18)  HR: 76 (13 Feb 2022 23:22) (76 - 81)  BP: 125/72 (13 Feb 2022 23:22) (125/72 - 136/72)  BP(mean): --  RR: 19 (13 Feb 2022 23:22) (16 - 19)  SpO2: 97% (13 Feb 2022 23:22) (96% - 98%) Patient seen and examined at bedside.    Vital Signs Last 24 Hrs  T(C): 36.4 (13 Feb 2022 23:22), Max: 36.7 (13 Feb 2022 19:18)  T(F): 97.6 (13 Feb 2022 23:22), Max: 98 (13 Feb 2022 19:18)  HR: 76 (13 Feb 2022 23:22) (76 - 81)  BP: 125/72 (13 Feb 2022 23:22) (125/72 - 136/72)  BP(mean): --  RR: 19 (13 Feb 2022 23:22) (16 - 19)  SpO2: 97% (13 Feb 2022 23:22) (96% - 98%)    Patient in distress 2/2 nausea  suprapubic tenderness+, abd soft, no guarding.  rest as above.                          12.2   12.95 )-----------( 260      ( 13 Feb 2022 19:59 )             36.2       02-13    137  |  106  |  19<H>  ----------------------------<  103<H>  4.0   |  27  |  0.94    Ca    9.5      13 Feb 2022 19:59    TPro  8.1  /  Alb  4.3  /  TBili  0.3  /  DBili  x   /  AST  28  /  ALT  60  /  AlkPhos  83  02-13      CT abd/pelvis w/ iv contrast:  No CT finding to explain the patient's pain. No CT evidence for pyelitis   as clinically questioned.  1.5 cm indeterminant left renal lower pole cyst with thickened irregular   wall, unchanged from previous exam (4:78). Recommend ultrasound and/or   dedicated MRI for further characterization.      Assessment and plan:    Patient is a 56 years old female with PMH of, recurrent UTI, ( last urine cx pansensitive E coli),  pyelitis (Sep - on Sulfa/Trim for 3 months), GERD, h/o DVT on left thigh 2004 completed coumadin for one year, h/o ovarian cyst removal; p/w c/o suprapubic tenderness. Noted to have wbc 12 k, but other afebrile, HD stable, Labs unremarkable beside positive UA, CT abd pelvis w/ iv contrast negative for acute pyelonephritis, is being admitted to medicine service 2/2  intractable nausea that patient reports developed after receiving morphine ( 6 mg ) in ED.     UTI/suprapubic pain- UA positive, wbc 12k, no fever. h/o recurrent UTI   - s/p iv Rocephin in ED. urine cx sent  - CT abd/pelvis  No CT evidence for pyelitis as clinically questioned.1.5 cm indeterminant left renal lower pole cyst with thickened irregular   wall, unchanged from previous exam.  - urine cx sent.  - s/p iv Toradol and 6 mg morphine in ED.    Nausea: Patient reports nausea after receiving iv morphine, in distress despite receiving iv zofran  - reports h/o nausea with morphine, will avoid morphine.  - also h/o GERD, s/p iv PPI in ED.  - Reglan for nausea, simethicone.  - Patient also reporting left sided neck pain, ordered EKG negative for acute ischemic changes, trop x 1 negative.     h/o DVT in 2004, wells score low for VTE.     GERD: iv ppi od.    DVT ppx: s/c Lovenox 40 mg od. Patient seen and examined at bedside.    Vital Signs Last 24 Hrs  T(C): 36.4 (13 Feb 2022 23:22), Max: 36.7 (13 Feb 2022 19:18)  T(F): 97.6 (13 Feb 2022 23:22), Max: 98 (13 Feb 2022 19:18)  HR: 76 (13 Feb 2022 23:22) (76 - 81)  BP: 125/72 (13 Feb 2022 23:22) (125/72 - 136/72)  BP(mean): --  RR: 19 (13 Feb 2022 23:22) (16 - 19)  SpO2: 97% (13 Feb 2022 23:22) (96% - 98%)    Patient in distress 2/2 nausea  suprapubic tenderness+, abd soft, no guarding.  rest as above.                          12.2   12.95 )-----------( 260      ( 13 Feb 2022 19:59 )             36.2       02-13    137  |  106  |  19<H>  ----------------------------<  103<H>  4.0   |  27  |  0.94    Ca    9.5      13 Feb 2022 19:59    TPro  8.1  /  Alb  4.3  /  TBili  0.3  /  DBili  x   /  AST  28  /  ALT  60  /  AlkPhos  83  02-13      CT abd/pelvis w/ iv contrast:  No CT finding to explain the patient's pain. No CT evidence for pyelitis   as clinically questioned.  1.5 cm indeterminant left renal lower pole cyst with thickened irregular   wall, unchanged from previous exam (4:78). Recommend ultrasound and/or   dedicated MRI for further characterization.      Assessment and plan:    Patient is a 56 years old female with PMH of, recurrent UTI, ( last urine cx pansensitive E coli),  pyelitis (Sep - on Sulfa/Trim for 3 months), GERD, h/o DVT on left thigh 2004 completed coumadin for one year, h/o ovarian cyst removal; p/w c/o suprapubic tenderness. Noted to have wbc 12 k, but other afebrile, HD stable, Labs unremarkable beside positive UA, CT abd pelvis w/ iv contrast negative for acute pyelonephritis, is being admitted to medicine service 2/2  intractable nausea that patient reports developed after receiving morphine ( 6 mg ) in ED.     UTI/suprapubic pain- UA positive, wbc 12k, no fever. h/o recurrent UTI   - s/p iv Rocephin in ED. urine cx sent  - CT abd/pelvis  No CT evidence for pyelitis as clinically questioned.1.5 cm indeterminant left renal lower pole cyst with thickened irregular   wall, unchanged from previous exam.  - urine cx sent.  - s/p iv Toradol and 6 mg morphine in ED.   - c/w iv Rocephin od.    Nausea: Patient reports nausea after receiving iv morphine, in distress despite receiving iv zofran  - reports h/o nausea with morphine, will avoid morphine.  - also h/o GERD, s/p iv PPI in ED.  - Reglan for nausea, simethicone.  - Patient also reporting left sided neck pain, ordered EKG negative for acute ischemic changes, trop x 1 negative.     h/o DVT in 2004, wells score low for VTE.     GERD: iv ppi od.    DVT ppx: s/c Lovenox 40 mg od.

## 2022-02-14 NOTE — ED ADULT NURSE REASSESSMENT NOTE - NS ED NURSE REASSESS COMMENT FT1
Pt is alert and oriented x3. Pt c/o nausea. Pt is medicated per order. Pt voiced improvement. Family is at bedside. Pt/family requests to go home. Dr. Shane notified. Dr. Shane spoke to pt and family. Pt decides to remain in the hospital. Pt has an assigned bed. Pt refused to go up because her family can't accompany to her room. Pt is alert and oriented x3. Pt c/o nausea. Pt is medicated per order. Pt voiced improvement. Family is at bedside. Pt/family requests to go home. Dr. Shane notified. Dr. Shane spoke to pt and family. Pt decides to remain in the hospital. Pt has an assigned bed. Pt refused to go up due to inpatient visiting hour policy. KATHIE Barone made aware.

## 2022-02-14 NOTE — DISCHARGE NOTE NURSING/CASE MANAGEMENT/SOCIAL WORK - PATIENT PORTAL LINK FT
You can access the FollowMyHealth Patient Portal offered by [facility] by registering at the following website: [email or web address]. By joining [employer] [facility]’s FollowMyHealth portal, you will also be able to view your health information using other applications (apps) compatible with our system.

## 2022-02-14 NOTE — H&P ADULT - PROBLEM SELECTOR PLAN 2
- Plan as above   - Patient got 6mg IV morphine and Toradol 30mg IV as well   - Patient feels nauseated; Will give reglan PRN and Simethicone for abdominal gas

## 2022-02-14 NOTE — H&P ADULT - PROBLEM SELECTOR PLAN 1
- Patient with past hx of UTI and pyelitis came today with dysuria and lower mid abdominal pain  - Patient was discharged before on Sulfa/Trim for 3 months   - CT abdomen and pelvis IV didn't show pyelonephritis / But renal cyst on 1.5cm with irregular wall thickness   - UA is positive   - Will start on Rocephin IV   - Urine culture was sent

## 2022-02-16 LAB
-  AMIKACIN: SIGNIFICANT CHANGE UP
-  AMOXICILLIN/CLAVULANIC ACID: SIGNIFICANT CHANGE UP
-  AMPICILLIN/SULBACTAM: SIGNIFICANT CHANGE UP
-  AMPICILLIN: SIGNIFICANT CHANGE UP
-  AZTREONAM: SIGNIFICANT CHANGE UP
-  CEFAZOLIN: SIGNIFICANT CHANGE UP
-  CEFEPIME: SIGNIFICANT CHANGE UP
-  CEFOXITIN: SIGNIFICANT CHANGE UP
-  CEFTRIAXONE: SIGNIFICANT CHANGE UP
-  CIPROFLOXACIN: SIGNIFICANT CHANGE UP
-  ERTAPENEM: SIGNIFICANT CHANGE UP
-  GENTAMICIN: SIGNIFICANT CHANGE UP
-  IMIPENEM: SIGNIFICANT CHANGE UP
-  LEVOFLOXACIN: SIGNIFICANT CHANGE UP
-  MEROPENEM: SIGNIFICANT CHANGE UP
-  NITROFURANTOIN: SIGNIFICANT CHANGE UP
-  PIPERACILLIN/TAZOBACTAM: SIGNIFICANT CHANGE UP
-  TIGECYCLINE: SIGNIFICANT CHANGE UP
-  TOBRAMYCIN: SIGNIFICANT CHANGE UP
-  TRIMETHOPRIM/SULFAMETHOXAZOLE: SIGNIFICANT CHANGE UP
CULTURE RESULTS: SIGNIFICANT CHANGE UP
GLUCOSE BLDC GLUCOMTR-MCNC: 115 MG/DL — HIGH (ref 70–99)
METHOD TYPE: SIGNIFICANT CHANGE UP
ORGANISM # SPEC MICROSCOPIC CNT: SIGNIFICANT CHANGE UP
ORGANISM # SPEC MICROSCOPIC CNT: SIGNIFICANT CHANGE UP
SPECIMEN SOURCE: SIGNIFICANT CHANGE UP

## 2022-02-18 RX ORDER — NITROFURANTOIN MACROCRYSTAL 50 MG
1 CAPSULE ORAL
Qty: 14 | Refills: 0
Start: 2022-02-18 | End: 2022-02-24

## 2022-02-25 PROBLEM — N12 TUBULO-INTERSTITIAL NEPHRITIS, NOT SPECIFIED AS ACUTE OR CHRONIC: Chronic | Status: ACTIVE | Noted: 2022-02-13

## 2022-02-25 PROBLEM — N39.0 URINARY TRACT INFECTION, SITE NOT SPECIFIED: Chronic | Status: ACTIVE | Noted: 2022-02-13

## 2022-04-04 ENCOUNTER — LABORATORY RESULT (OUTPATIENT)
Age: 57
End: 2022-04-04

## 2022-04-04 ENCOUNTER — APPOINTMENT (OUTPATIENT)
Dept: NEPHROLOGY | Facility: CLINIC | Age: 57
End: 2022-04-04
Payer: MEDICARE

## 2022-04-04 VITALS — SYSTOLIC BLOOD PRESSURE: 131 MMHG | DIASTOLIC BLOOD PRESSURE: 80 MMHG | HEART RATE: 67 BPM

## 2022-04-04 VITALS — DIASTOLIC BLOOD PRESSURE: 75 MMHG | SYSTOLIC BLOOD PRESSURE: 123 MMHG | HEART RATE: 69 BPM

## 2022-04-04 DIAGNOSIS — K21.9 GASTRO-ESOPHAGEAL REFLUX DISEASE W/OUT ESOPHAGITIS: ICD-10-CM

## 2022-04-04 DIAGNOSIS — R74.9 ABNORMAL SERUM ENZYME LEVEL, UNSPECIFIED: ICD-10-CM

## 2022-04-04 DIAGNOSIS — Z00.00 ENCOUNTER FOR GENERAL ADULT MEDICAL EXAMINATION W/OUT ABNORMAL FINDINGS: ICD-10-CM

## 2022-04-04 DIAGNOSIS — Z83.79 FAMILY HISTORY OF OTHER DISEASES OF THE DIGESTIVE SYSTEM: ICD-10-CM

## 2022-04-04 PROCEDURE — 36415 COLL VENOUS BLD VENIPUNCTURE: CPT

## 2022-04-04 PROCEDURE — 93000 ELECTROCARDIOGRAM COMPLETE: CPT

## 2022-04-04 PROCEDURE — 99205 OFFICE O/P NEW HI 60 MIN: CPT | Mod: 25

## 2022-04-04 NOTE — PHYSICAL EXAM
[General Appearance - Alert] : alert [General Appearance - In No Acute Distress] : in no acute distress [Sclera] : the sclera and conjunctiva were normal [PERRL With Normal Accommodation] : pupils were equal in size, round, and reactive to light [Extraocular Movements] : extraocular movements were intact [Outer Ear] : the ears and nose were normal in appearance [Examination Of The Oral Cavity] : the lips and gums were normal [Neck Appearance] : the appearance of the neck was normal [Neck Cervical Mass (___cm)] : no neck mass was observed [Jugular Venous Distention Increased] : there was no jugular-venous distention [Thyroid Diffuse Enlargement] : the thyroid was not enlarged [Thyroid Nodule] : there were no palpable thyroid nodules [Auscultation Breath Sounds / Voice Sounds] : lungs were clear to auscultation bilaterally [Full Pulse] : the pedal pulses are present [Edema] : there was no peripheral edema [Bowel Sounds] : normal bowel sounds [Abdomen Soft] : soft [No CVA Tenderness] : no ~M costovertebral angle tenderness [No Spinal Tenderness] : no spinal tenderness [Abnormal Walk] : normal gait [Nail Clubbing] : no clubbing  or cyanosis of the fingernails [Musculoskeletal - Swelling] : no joint swelling seen [Motor Tone] : muscle strength and tone were normal [Skin Color & Pigmentation] : normal skin color and pigmentation [Skin Turgor] : normal skin turgor [] : no rash [Deep Tendon Reflexes (DTR)] : deep tendon reflexes were 2+ and symmetric [Sensation] : the sensory exam was normal to light touch and pinprick [No Focal Deficits] : no focal deficits [Oriented To Time, Place, And Person] : oriented to person, place, and time [Impaired Insight] : insight and judgment were intact [Affect] : the affect was normal [FreeTextEntry1] : mild pain with palpation

## 2022-04-04 NOTE — ASSESSMENT
[FreeTextEntry1] : Plan\par 1) Previous CT abdomen and Pelvis: Instructed patient to obtain disc from [facility] to import the images for future comparison.\par 2) Chronic UTIs: Will attempt to find a point source for recurrent infections, which could include an MRI of her abnormal left renal cyst, sonogram of the abdomen/pelvis and gallium scan.\par 3) Pending results above, may wish to request second infectious disease opinion.\par 4) Possible referral to urology for urodynamic evaluation.\par \par No changes to medications. \par \par EKG done today demonstrates normal sinus rhythm.\par Labs were drawn and I will contact patient within a few days to discuss testing results.

## 2022-04-04 NOTE — ADDENDUM
[FreeTextEntry1] : All medical record entries made by the Scribe were at my, Dr. Morrison's, discretion and personally dictated by me on 04/04/2022. I have reviewed the chart and agree that the record accurately reflects my personal performance of the history, physical exam, assessment and plan. I have also personally directed, reviewed and agreed to the chart.

## 2022-04-04 NOTE — HISTORY OF PRESENT ILLNESS
[FreeTextEntry1] : Patient is a 56 year old female who presents today for initial evaluation of chronic UTIs along with active reassessment of back pain, asthma, elevated liver enzymes, h/o blood clot. Patient reports she's had 3 UTIs in the past year that have been treated with antibiotics. She states the second urine infection spread to her kidney in 2021. Patient's most recent urine culture demonstrated e.coli ESBL in her urine and she is receiving a 14 day course of IV ertapenem, prescribed by Dr. Halina Davis. She has seen urologists Dr. Kamlesh Adams and Dr. Ilir Eldridge.\par \par A review of the medications patient has taken for UTIs revealed patient was taking nitrofurantoin and Cipro in 2021, then cefuroxime and phenazopyridine in 2021. She was treated with Bactrim QOD for 3 months by Dr. Kamlesh Bhandari. She was on nitrofurantoin in 2022. Patient reports she has not done a blood culture. She describes her symptoms as pressure and constant discomfort in the abdomen, although she denies a significant burning sensation. She denies fevers but endorses feeling chills. She has a GYN named Dr. Wilkins.\par \par Patient also reports a h/o herniated and bulging discs associated with chronic pain. States she does physical therapy and takes NSAIDs and muscle relaxers as well as Tramadol PRN. She endorses h/o blood clot in her thigh 15 years ago after surgery. She denies HTN, CAD, DM, heart problems, palpitations, murmurs, neurologic problems, kidney stones. \par \par CT abdomen and pelvis 22 demonstrates no CT finding to explain patient's pain. No CT evidence for pyelitis. 1.5 cm indeterminant left renal lower pole cyst with thickened irregular wall. Recommend ultrasound and/or dedicated MRI for further characterization.\par \par Patient had COVID twice. She is vaccinated.\par \par Past Medical History:\par Asthma \par GERD\par Abnormal liver enzymes\par Bulging and herniated spinal discs\par \par Past Surgical History/Hospitalizations:\par Ovarian cystectomy\par Salpingo-oophorectomy\par \par Family History:\par Mother recently passed away from kidney issues, DM, HTN\par Father and maternal aunt  of liver cirrhosis\par Brother and sister are healthy\par Two children are healthy\par \par Social History:\par Patient was born in [facility]. Now lives in [place]. \par She is not working; she is disabled due to her back pain but was a .\par Denies smoking, alcohol use.\par No pets at home.\par \par Current Medications:\par NSAIDS, muscle relaxer, Tramadol PRN for back pain, cyclobenzaprine, IV ertapenem \par \par Allergies: NKDA

## 2022-04-04 NOTE — END OF VISIT
[FreeTextEntry3] : This note was written by Gabrielle Cooper on 04/04/2022 acting as scribe for Dr. Morrison.

## 2022-04-07 LAB
24R-OH-CALCIDIOL SERPL-MCNC: 57.4 PG/ML
25(OH)D3 SERPL-MCNC: 37.3 NG/ML
A1AT SERPL-MCNC: 114 MG/DL
AFP-TM SERPL-MCNC: 2.9 NG/ML
ALBUMIN SERPL ELPH-MCNC: 5.2 G/DL
ALDOSTERONE SERUM: 3.4 NG/DL
ALP BLD-CCNC: 75 U/L
ALP BONE SERPL-MCNC: 12.2 UG/L
ALT SERPL-CCNC: 49 U/L
ANA SER IF-ACNC: NEGATIVE
ANION GAP SERPL CALC-SCNC: 14 MMOL/L
APPEARANCE: CLEAR
AST SERPL-CCNC: 27 U/L
B2 GLYCOPROT1 IGA SERPL IA-ACNC: 9.2 SAU
B2 GLYCOPROT1 IGG SER-ACNC: <5 SGU
B2 GLYCOPROT1 IGM SER-ACNC: <5 SMU
B2 MICROGLOB SERPL-MCNC: 1.5 MG/L
BACTERIA UR CULT: NORMAL
BACTERIA: NEGATIVE
BASOPHILS # BLD AUTO: 0.03 K/UL
BASOPHILS NFR BLD AUTO: 0.5 %
BILIRUB DIRECT SERPL-MCNC: 0.1 MG/DL
BILIRUB SERPL-MCNC: 0.3 MG/DL
BILIRUBIN URINE: NEGATIVE
BLOOD URINE: NEGATIVE
BUN SERPL-MCNC: 17 MG/DL
C1Q IMMUNE COMPLEX: 1.9 UG EQ/ML
C3 SERPL-MCNC: 163 MG/DL
C4 SERPL-MCNC: 24 MG/DL
CALCIUM SERPL-MCNC: 9.7 MG/DL
CALCIUM SERPL-MCNC: 9.7 MG/DL
CHLORIDE SERPL-SCNC: 103 MMOL/L
CHOLEST SERPL-MCNC: 148 MG/DL
CO2 SERPL-SCNC: 24 MMOL/L
COLOR: NORMAL
COVID-19 NUCLEOCAPSID  GAM ANTIBODY INTERPRETATION: POSITIVE
COVID-19 SPIKE DOMAIN ANTIBODY INTERPRETATION: POSITIVE
CREAT SERPL-MCNC: 0.62 MG/DL
CRP SERPL-MCNC: <3 MG/L
DEPRECATED KAPPA LC FREE/LAMBDA SER: 1.24 RATIO
EGFR: 105 ML/MIN/1.73M2
EOSINOPHIL # BLD AUTO: 0.12 K/UL
EOSINOPHIL NFR BLD AUTO: 2 %
ERYTHROCYTE [SEDIMENTATION RATE] IN BLOOD BY WESTERGREN METHOD: 13 MM/HR
ESTIMATED AVERAGE GLUCOSE: 120 MG/DL
FERRITIN SERPL-MCNC: 385 NG/ML
FOLATE SERPL-MCNC: 14.9 NG/ML
FREE KAPPA URINE: 14.5 MG/L
FREE KAPPA/LAMDA RATIO: 1.11 RATIO
FREE LAMDA URINE: 13.03 MG/L
GGT SERPL-CCNC: 58 U/L
GLUCOSE QUALITATIVE U: NEGATIVE
GLUCOSE SERPL-MCNC: 88 MG/DL
HBA1C MFR BLD HPLC: 5.8 %
HBV SURFACE AB SER QL: REACTIVE
HBV SURFACE AG SER QL: NONREACTIVE
HCT VFR BLD CALC: 38.7 %
HCV AB SER QL: NONREACTIVE
HCV S/CO RATIO: 0.35 S/CO
HCYS SERPL-MCNC: 6.9 UMOL/L
HDLC SERPL-MCNC: 48 MG/DL
HGB BLD-MCNC: 12.4 G/DL
HYALINE CASTS: 0 /LPF
IGA 24H UR QL IFE: NORMAL
IMM GRANULOCYTES NFR BLD AUTO: 0.2 %
IRON SATN MFR SERPL: 35 %
IRON SERPL-MCNC: 115 UG/DL
KAPPA LC 24H UR QL: NORMAL
KAPPA LC CSF-MCNC: 1.31 MG/DL
KAPPA LC SERPL-MCNC: 1.63 MG/DL
KETONES URINE: NEGATIVE
LDLC SERPL CALC-MCNC: 71 MG/DL
LEUKOCYTE ESTERASE URINE: NEGATIVE
LYMPHOCYTES # BLD AUTO: 2.18 K/UL
LYMPHOCYTES NFR BLD AUTO: 35.6 %
MAGNESIUM SERPL-MCNC: 2.2 MG/DL
MAN DIFF?: NORMAL
MCHC RBC-ENTMCNC: 29.9 PG
MCHC RBC-ENTMCNC: 32 GM/DL
MCV RBC AUTO: 93.3 FL
MICROSCOPIC-UA: NORMAL
MONOCYTES # BLD AUTO: 0.53 K/UL
MONOCYTES NFR BLD AUTO: 8.6 %
NEUTROPHILS # BLD AUTO: 3.26 K/UL
NEUTROPHILS NFR BLD AUTO: 53.1 %
NITRITE URINE: NEGATIVE
NONHDLC SERPL-MCNC: 101 MG/DL
PARATHYROID HORMONE INTACT: 43 PG/ML
PH URINE: 6.5
PHOSPHATE SERPL-MCNC: 3.8 MG/DL
PLATELET # BLD AUTO: 235 K/UL
POTASSIUM SERPL-SCNC: 4 MMOL/L
PROCALCITONIN SERPL-MCNC: 0.03 NG/ML
PROT SERPL-MCNC: 8 G/DL
PROTEIN URINE: NEGATIVE
RBC # BLD: 4.15 M/UL
RBC # FLD: 12.5 %
RED BLOOD CELLS URINE: 2 /HPF
RHEUMATOID FACT SER QL: <10 IU/ML
SARS-COV-2 AB SERPL IA-ACNC: >250 U/ML
SARS-COV-2 AB SERPL QL IA: 184 INDEX
SODIUM SERPL-SCNC: 141 MMOL/L
SPECIFIC GRAVITY URINE: 1.01
SQUAMOUS EPITHELIAL CELLS: 0 /HPF
T3FREE SERPL-MCNC: 3.08 PG/ML
T3RU NFR SERPL: 1.1 TBI
T4 FREE SERPL-MCNC: 1 NG/DL
T4 SERPL-MCNC: 7.2 UG/DL
THYROGLOB AB SERPL-ACNC: <20 IU/ML
THYROPEROXIDASE AB SERPL IA-ACNC: 10.7 IU/ML
TIBC SERPL-MCNC: 328 UG/DL
TOTAL IGE SMQN RAST: 24 KU/L
TRIGL SERPL-MCNC: 146 MG/DL
TSH SERPL-ACNC: 1.81 UIU/ML
UIBC SERPL-MCNC: 213 UG/DL
URATE SERPL-MCNC: 3.5 MG/DL
UROBILINOGEN URINE: NORMAL
VIT B12 SERPL-MCNC: 646 PG/ML
WBC # FLD AUTO: 6.13 K/UL
WHITE BLOOD CELLS URINE: 0 /HPF

## 2022-04-08 ENCOUNTER — TRANSCRIPTION ENCOUNTER (OUTPATIENT)
Age: 57
End: 2022-04-08

## 2022-04-10 LAB
ALBUMIN MFR SERPL ELPH: 60.2 %
ALBUMIN SERPL-MCNC: 4.8 G/DL
ALBUMIN/GLOB SERPL: 1.5 RATIO
ALPHA1 GLOB MFR SERPL ELPH: 3.3 %
ALPHA1 GLOB SERPL ELPH-MCNC: 0.3 G/DL
ALPHA2 GLOB MFR SERPL ELPH: 9.1 %
ALPHA2 GLOB SERPL ELPH-MCNC: 0.7 G/DL
B-GLOBULIN MFR SERPL ELPH: 10.9 %
B-GLOBULIN SERPL ELPH-MCNC: 0.9 G/DL
B2 MICROGLOB 24H UR-MCNC: 22 UG/L
COLLAGEN CTX SERPL-MCNC: 130 PG/ML
DEPRECATED KAPPA LC FREE/LAMBDA SER: 1.24 RATIO
GAMMA GLOB FLD ELPH-MCNC: 1.3 G/DL
GAMMA GLOB MFR SERPL ELPH: 16.5 %
IGA SER QL IEP: 181 MG/DL
IGG SER QL IEP: 1263 MG/DL
IGM SER QL IEP: 186 MG/DL
INTERPRETATION SERPL IEP-IMP: NORMAL
KAPPA LC CSF-MCNC: 1.31 MG/DL
KAPPA LC SERPL-MCNC: 1.63 MG/DL
M PROTEIN SPEC IFE-MCNC: NORMAL
PROT SERPL-MCNC: 8 G/DL
PROT SERPL-MCNC: 8 G/DL

## 2022-04-12 ENCOUNTER — APPOINTMENT (OUTPATIENT)
Dept: NEPHROLOGY | Facility: CLINIC | Age: 57
End: 2022-04-12
Payer: MEDICARE

## 2022-04-12 DIAGNOSIS — M13.0 POLYARTHRITIS, UNSPECIFIED: ICD-10-CM

## 2022-04-12 PROCEDURE — 99214 OFFICE O/P EST MOD 30 MIN: CPT | Mod: 95

## 2022-04-12 NOTE — HISTORY OF PRESENT ILLNESS
[Home] : at home, [unfilled] , at the time of the visit. [Medical Office: ([facility])___] : at the medical office located in  [Verbal consent obtained from patient] : the patient, [unfilled] [FreeTextEntry1] : Patient is a 56 year old female who presents today for follow up evaluation of chronic UTIs along with active reassessment of back pain, asthma, GERD, elevated liver enzymes, herniated and bulging discs, h/o blood clot. She is s/p ovarian cystectomy and salpingo-oophorectomy.\par \par Patient is doing generally well today and reports she stopped IV ertapenem two days ago with Dr. Halina Davis. \par \par Labs 04/04/22: COVID spike domain antibody positive, HDL 48, albumin 5.2, ALT 49, gamma GT 58, ferritin 385, urine specific gravity 1.008, HGB A1C 5.8%, Hep B surface Ab reactive, C3 163\par \par CT abdomen and pelvis 2/13/22 demonstrates no CT finding to explain patient's pain. No CT evidence for pyelitis. 1.5 cm indeterminant left renal lower pole cyst with thickened irregular wall. Recommend ultrasound and/or dedicated MRI for further characterization.\par \par Patient had COVID twice. She is vaccinated.\par \par

## 2022-04-12 NOTE — ADDENDUM
[FreeTextEntry1] : All medical record entries made by the Scribe were at my, Dr. Morrison's, discretion and personally dictated by me on 04/12/2022. I have reviewed the chart and agree that the record accurately reflects my personal performance of the history, physical exam, assessment and plan. I have also personally directed, reviewed and agreed to the chart.

## 2022-04-12 NOTE — END OF VISIT
[FreeTextEntry3] : This note was written by Gabrielle Cooper on 04/12/2022 acting as scribe for Dr. Morrison.

## 2022-04-12 NOTE — PHYSICAL EXAM
[General Appearance - Alert] : alert [General Appearance - In No Acute Distress] : in no acute distress [Hearing Threshold Finger Rub Not [place]] : hearing was normal [] : no respiratory distress [Oriented To Time, Place, And Person] : oriented to person, place, and time [Impaired Insight] : insight and judgment were intact

## 2022-04-12 NOTE — ASSESSMENT
[FreeTextEntry1] : Plan\par 1) We reviewed patient's most recent blood work. Urinalysis and urine culture were negative.\par 2) Recent HGB A1C 5.8% elevated. Will continue to monitor with blood work at later date.\par 3) Previous CT abdomen and Pelvis: Instructed patient to obtain disc from [facility] to import the images for future comparison.\par 4) Chronic UTIs: Urinalysis and urine culture were negative so patient requires further workup. Will speak to urology and radiology colleagues, likely ID colleagues, to determine what best approach is. Patient may need a gallium scan or MRI.\par \par No changes to medications. \par \par I will get in touch with the other physicians. Patient will make another telemedicine appointment in the next few weeks so we can discuss next steps. \par

## 2022-05-01 LAB
ANNOTATION COMMENT IMP: NORMAL
ELECTRONIC SIGNATURE: NORMAL
METHYLMALONATE SERPL-SCNC: 103 NMOL/L
SERPINA1 GENE MUT TESTED BLD/T: NORMAL

## 2022-05-09 ENCOUNTER — APPOINTMENT (OUTPATIENT)
Dept: NEPHROLOGY | Facility: CLINIC | Age: 57
End: 2022-05-09
Payer: MEDICARE

## 2022-05-09 DIAGNOSIS — N28.1 CYST OF KIDNEY, ACQUIRED: ICD-10-CM

## 2022-05-09 DIAGNOSIS — M51.36 OTHER INTERVERTEBRAL DISC DEGENERATION, LUMBAR REGION: ICD-10-CM

## 2022-05-09 PROCEDURE — 99214 OFFICE O/P EST MOD 30 MIN: CPT | Mod: 95

## 2022-05-09 RX ORDER — METHENAMINE HIPPURATE 1 G/1
1 TABLET ORAL
Qty: 60 | Refills: 10 | Status: ACTIVE | COMMUNITY
Start: 2022-05-09 | End: 1900-01-01

## 2022-05-09 NOTE — HISTORY OF PRESENT ILLNESS
[Home] : at home, [unfilled] , at the time of the visit. [Medical Office: ([facility])___] : at the medical office located in  [Verbal consent obtained from patient] : the patient, [unfilled] [FreeTextEntry1] : Patient is a 56 year old female who presents today for follow up evaluation of chronic UTIs along with active reassessment of back pain, asthma, GERD, elevated liver enzymes, herniated and bulging discs, h/o blood clot. She is s/p ovarian cystectomy and salpingo-oophorectomy. H/o 2 COVID infections.\par \par Patient is doing generally well today and reports she has not had any infections since she stopped IV ertapenem. She endorses h/o 3 infections in last 10 months.\par - Pt is COVID vaccinated\par \par Labs 04/04/22: COVID spike domain antibody positive, HDL 48, albumin 5.2, ALT 49, gamma GT 58, ferritin 385, urine specific gravity 1.008, HGB A1C 5.8%, Hep B surface Ab reactive, C3 163\par \par CT abdomen and pelvis 2/13/22 demonstrates no CT finding to explain patient's pain. No CT evidence for pyelitis. 1.5 cm indeterminant left renal lower pole cyst with thickened irregular wall. Recommend ultrasound and/or dedicated MRI for further characterization.\par \par \par

## 2022-05-09 NOTE — ADDENDUM
[FreeTextEntry1] : All medical record entries made by the Scribe were at my, Dr. Morrison's, discretion and personally dictated by me on 05/09/2022. I have reviewed the chart and agree that the record accurately reflects my personal performance of the history, physical exam, assessment and plan. I have also personally directed, reviewed and agreed to the chart.

## 2022-05-09 NOTE — PHYSICAL EXAM
[General Appearance - Alert] : alert [General Appearance - In No Acute Distress] : in no acute distress [Affect] : the affect was normal

## 2022-05-09 NOTE — ASSESSMENT
[FreeTextEntry1] : Plan\par 1) Hyperlipidemia: Recent HGB A1C 5.8% elevated. Will continue to monitor with blood work at later date.\par 2) CT abdomen and pelvis 2/13/22 demonstrates left renal lower pole cyst with thickened irregular wall. Will order US and dedicated MRI at [facility] for further investigation.\par 3) Chronic UTIs: Will put patient on Hiprex. If patient continues to have recurrent infections, will refer her to both a urologist and an ID specialist.\par \par Changes to medications: Start Hiprex\par \par Patient will follow up after doing US and MRI and then we will discuss next steps in management.\par

## 2022-05-09 NOTE — END OF VISIT
[FreeTextEntry3] : This note was written by Gabrielle Cooper on 05/09/2022 acting as scribe for Dr. Morrison.

## 2022-06-07 ENCOUNTER — APPOINTMENT (OUTPATIENT)
Dept: NEPHROLOGY | Facility: CLINIC | Age: 57
End: 2022-06-07
Payer: MEDICARE

## 2022-06-07 ENCOUNTER — LABORATORY RESULT (OUTPATIENT)
Age: 57
End: 2022-06-07

## 2022-06-07 VITALS — DIASTOLIC BLOOD PRESSURE: 60 MMHG | SYSTOLIC BLOOD PRESSURE: 108 MMHG | HEART RATE: 72 BPM

## 2022-06-07 VITALS — TEMPERATURE: 98.1 F | BODY MASS INDEX: 27.02 KG/M2 | HEART RATE: 66 BPM | WEIGHT: 167.4 LBS | OXYGEN SATURATION: 98 %

## 2022-06-07 VITALS — DIASTOLIC BLOOD PRESSURE: 72 MMHG | HEART RATE: 72 BPM | SYSTOLIC BLOOD PRESSURE: 124 MMHG

## 2022-06-07 VITALS — DIASTOLIC BLOOD PRESSURE: 82 MMHG | SYSTOLIC BLOOD PRESSURE: 108 MMHG | HEART RATE: 72 BPM

## 2022-06-07 DIAGNOSIS — Z87.19 PERSONAL HISTORY OF OTHER DISEASES OF THE DIGESTIVE SYSTEM: ICD-10-CM

## 2022-06-07 DIAGNOSIS — R79.89 OTHER SPECIFIED ABNORMAL FINDINGS OF BLOOD CHEMISTRY: ICD-10-CM

## 2022-06-07 DIAGNOSIS — N28.1 CYST OF KIDNEY, ACQUIRED: ICD-10-CM

## 2022-06-07 DIAGNOSIS — N39.0 URINARY TRACT INFECTION, SITE NOT SPECIFIED: ICD-10-CM

## 2022-06-07 PROCEDURE — 36415 COLL VENOUS BLD VENIPUNCTURE: CPT

## 2022-06-07 PROCEDURE — 99214 OFFICE O/P EST MOD 30 MIN: CPT | Mod: 25

## 2022-06-07 NOTE — HISTORY OF PRESENT ILLNESS
[FreeTextEntry1] : Patient is a 56 year old female who is here today for follow up evaluation of chronic UTIs and active reassessment of back pain, GERD, elevated liver enzymes, lumbar disc, herniated and bulging discs and h/o blood clot. She is s/p ovarian cystectomy and salpingo-oophorectomy. H/o 2 COVID infections.\par \par The patient is feeling generally well today. She states that her prior pain has resolved and has not had any symptoms of UTIs. She reports increased symptoms of GERD which she attributes to her Hiprex. She states she has been eating and drinking well. She denies any EtOh use. She has elevated liver enzymes which are known and though to be due to her use of NSAIDS (Dr. Mcpherson).\par \par 05/31/22 MRI Abdomen w/ w/o contrast:\par IMPRESSION: There is a 0.7cm left midpole Bosniak 2 cyst. No imaging f/u recommended.\par \par 05/31/22 US Renal:\par IMPRESSION: 1.3 x 1.3 cm simple cyst lower pole left kidney. \par \par ?Current medication: atorvastatin 80mg QD, Hiprex 1g BID, clonazepam 0.5mg

## 2022-06-07 NOTE — ADDENDUM
[FreeTextEntry1] : All medical record entries made by the Scribe were at my, URVASHI Montano's direction and personally dictated by me on 06/07/2022. I have received the chart and agree that the record accurately reflects my personal performance of the history, physical exam, assessment, and plan. I have also personally directed, reviewed, and agreed with the chart.

## 2022-06-07 NOTE — END OF VISIT
[Time Spent: ___ minutes] : I have spent [unfilled] minutes of time on the encounter. [FreeTextEntry3] : Documented by Alanis Raman acting as a scribe for URVASHI Montano on 06/07/2022.

## 2022-06-07 NOTE — ASSESSMENT
[FreeTextEntry1] : Plan \par 1) Patient will request records of positive cultures to be sent to my office for review.\par 2) Patient c/o increased GERD symptoms on Hiprex. Will therefore discontinue Hiprex and will watchfully monitor her for UTI symptoms and treat accordingly should symptoms arise.\par 3) BP acceptable today in office. Will reassess pressure at next evaluation. \par 4) Reviewed all imaging with patient today and patient is doing well. Advised to call if UTI symptoms arise.\par 5) GERD: Followed by gastroenterologist Dr. Mayo Mcpherson.\par 6) Will call [facility] Radiology office to request discs to be reviewed by our radiologists.\par \par Changes to medications: Discontinue Hiprex.\par \par Labs were drawn and patient will return in 4-6 months for a follow-up appointment. \par \par

## 2022-06-07 NOTE — PHYSICAL EXAM
[General Appearance - Alert] : alert [General Appearance - In No Acute Distress] : in no acute distress [Sclera] : the sclera and conjunctiva were normal [PERRL With Normal Accommodation] : pupils were equal in size, round, and reactive to light [Extraocular Movements] : extraocular movements were intact [Outer Ear] : the ears and nose were normal in appearance [Hearing Threshold Finger Rub Not [place]] : hearing was normal [Neck Appearance] : the appearance of the neck was normal [Neck Cervical Mass (___cm)] : no neck mass was observed [Jugular Venous Distention Increased] : there was no jugular-venous distention [Thyroid Diffuse Enlargement] : the thyroid was not enlarged [Thyroid Nodule] : there were no palpable thyroid nodules [Auscultation Breath Sounds / Voice Sounds] : lungs were clear to auscultation bilaterally [Heart Rate And Rhythm] : heart rate was normal and rhythm regular [Heart Sounds] : normal S1 and S2 [Heart Sounds Gallop] : no gallops [Murmurs] : no murmurs [Heart Sounds Pericardial Friction Rub] : no pericardial rub [Edema] : there was no peripheral edema [Bowel Sounds] : normal bowel sounds [Abdomen Soft] : soft [Abdomen Tenderness] : non-tender [Abdomen Mass (___ Cm)] : no abdominal mass palpated [No CVA Tenderness] : no ~M costovertebral angle tenderness [No Spinal Tenderness] : no spinal tenderness [Abnormal Walk] : normal gait [Involuntary Movements] : no involuntary movements were seen [Musculoskeletal - Swelling] : no joint swelling seen [Motor Tone] : muscle strength and tone were normal [Skin Color & Pigmentation] : normal skin color and pigmentation [Skin Turgor] : normal skin turgor [] : no rash [No Focal Deficits] : no focal deficits [Oriented To Time, Place, And Person] : oriented to person, place, and time [Impaired Insight] : insight and judgment were intact [Affect] : the affect was normal

## 2022-06-10 LAB
ALBUMIN MFR SERPL ELPH: 61.5 %
ALBUMIN SERPL ELPH-MCNC: 5.1 G/DL
ALBUMIN SERPL-MCNC: 4.7 G/DL
ALBUMIN/GLOB SERPL: 1.6 RATIO
ALP BLD-CCNC: 60 U/L
ALPHA1 GLOB MFR SERPL ELPH: 3.3 %
ALPHA1 GLOB SERPL ELPH-MCNC: 0.3 G/DL
ALPHA2 GLOB MFR SERPL ELPH: 7.6 %
ALPHA2 GLOB SERPL ELPH-MCNC: 0.6 G/DL
ALT SERPL-CCNC: 29 U/L
ANA PAT FLD IF-IMP: NORMAL
ANA SER IF-ACNC: ABNORMAL
ANION GAP SERPL CALC-SCNC: 13 MMOL/L
APPEARANCE: CLEAR
AST SERPL-CCNC: 27 U/L
B-GLOBULIN MFR SERPL ELPH: 10.9 %
B-GLOBULIN SERPL ELPH-MCNC: 0.8 G/DL
BACTERIA UR CULT: NORMAL
BACTERIA: NEGATIVE
BASOPHILS # BLD AUTO: 0.03 K/UL
BASOPHILS NFR BLD AUTO: 0.5 %
BILIRUB SERPL-MCNC: 0.3 MG/DL
BILIRUBIN URINE: NEGATIVE
BLOOD URINE: NEGATIVE
BUN SERPL-MCNC: 19 MG/DL
C3 SERPL-MCNC: 114 MG/DL
C4 SERPL-MCNC: 25 MG/DL
CALCIUM SERPL-MCNC: 10.1 MG/DL
CHLORIDE SERPL-SCNC: 101 MMOL/L
CHOLEST SERPL-MCNC: 117 MG/DL
CO2 SERPL-SCNC: 26 MMOL/L
COLOR: NORMAL
CREAT SERPL-MCNC: 0.7 MG/DL
DEPRECATED KAPPA LC FREE/LAMBDA SER: 1.3 RATIO
EGFR: 102 ML/MIN/1.73M2
EOSINOPHIL # BLD AUTO: 0.08 K/UL
EOSINOPHIL NFR BLD AUTO: 1.2 %
FERRITIN SERPL-MCNC: 305 NG/ML
FOLATE SERPL-MCNC: 18.6 NG/ML
GAMMA GLOB FLD ELPH-MCNC: 1.3 G/DL
GAMMA GLOB MFR SERPL ELPH: 16.7 %
GLUCOSE QUALITATIVE U: NEGATIVE
GLUCOSE SERPL-MCNC: 92 MG/DL
HBV SURFACE AB SER QL: REACTIVE
HBV SURFACE AG SER QL: NONREACTIVE
HCT VFR BLD CALC: 37.5 %
HCV AB SER QL: NONREACTIVE
HCV S/CO RATIO: 0.29 S/CO
HCYS SERPL-MCNC: 8.9 UMOL/L
HDLC SERPL-MCNC: 50 MG/DL
HGB BLD-MCNC: 12.3 G/DL
HYALINE CASTS: 1 /LPF
IGA SER QL IEP: 165 MG/DL
IGG SER QL IEP: 1175 MG/DL
IGM SER QL IEP: 170 MG/DL
IMM GRANULOCYTES NFR BLD AUTO: 0.2 %
INTERPRETATION SERPL IEP-IMP: NORMAL
IRON SATN MFR SERPL: 18 %
IRON SERPL-MCNC: 54 UG/DL
KAPPA LC CSF-MCNC: 1.23 MG/DL
KAPPA LC SERPL-MCNC: 1.6 MG/DL
KETONES URINE: NEGATIVE
LDLC SERPL CALC-MCNC: 56 MG/DL
LEUKOCYTE ESTERASE URINE: NEGATIVE
LYMPHOCYTES # BLD AUTO: 1.87 K/UL
LYMPHOCYTES NFR BLD AUTO: 28.6 %
M PROTEIN SPEC IFE-MCNC: NORMAL
MAGNESIUM SERPL-MCNC: 2.2 MG/DL
MAN DIFF?: NORMAL
MCHC RBC-ENTMCNC: 29.9 PG
MCHC RBC-ENTMCNC: 32.8 GM/DL
MCV RBC AUTO: 91 FL
MICROSCOPIC-UA: NORMAL
MONOCYTES # BLD AUTO: 0.42 K/UL
MONOCYTES NFR BLD AUTO: 6.4 %
NEUTROPHILS # BLD AUTO: 4.12 K/UL
NEUTROPHILS NFR BLD AUTO: 63.1 %
NITRITE URINE: NEGATIVE
NONHDLC SERPL-MCNC: 66 MG/DL
PH URINE: 6
PHOSPHATE SERPL-MCNC: 4.6 MG/DL
PLATELET # BLD AUTO: 216 K/UL
POTASSIUM SERPL-SCNC: 4 MMOL/L
PROT SERPL-MCNC: 7.6 G/DL
PROT SERPL-MCNC: 7.6 G/DL
PROT SERPL-MCNC: 7.7 G/DL
PROTEIN URINE: NEGATIVE
RBC # BLD: 4.12 M/UL
RBC # FLD: 12.6 %
RED BLOOD CELLS URINE: 2 /HPF
RHEUMATOID FACT SER QL: <10 IU/ML
SODIUM SERPL-SCNC: 140 MMOL/L
SPECIFIC GRAVITY URINE: 1.02
SQUAMOUS EPITHELIAL CELLS: 1 /HPF
TIBC SERPL-MCNC: 304 UG/DL
TRIGL SERPL-MCNC: 53 MG/DL
UIBC SERPL-MCNC: 250 UG/DL
URATE SERPL-MCNC: 3.3 MG/DL
UROBILINOGEN URINE: NORMAL
VIT B12 SERPL-MCNC: 774 PG/ML
WBC # FLD AUTO: 6.53 K/UL
WHITE BLOOD CELLS URINE: 1 /HPF

## 2022-07-05 LAB — METHYLMALONATE SERPL-SCNC: 84 NMOL/L

## 2022-08-03 NOTE — ED PROVIDER NOTE - BIRTH SEX
Airway       Patient location during procedure: OR  Staff -        CRNA: Tucker Omalley APRN CRNA       Other Anesthesia Staff: Rebeca James       Performed By: DANIELLA  Female

## 2023-01-21 ENCOUNTER — EMERGENCY (EMERGENCY)
Facility: HOSPITAL | Age: 58
LOS: 1 days | Discharge: HOME | End: 2023-01-21
Attending: STUDENT IN AN ORGANIZED HEALTH CARE EDUCATION/TRAINING PROGRAM
Payer: MEDICARE

## 2023-01-21 VITALS
HEIGHT: 66 IN | RESPIRATION RATE: 18 BRPM | DIASTOLIC BLOOD PRESSURE: 86 MMHG | WEIGHT: 164.91 LBS | HEART RATE: 117 BPM | OXYGEN SATURATION: 98 % | SYSTOLIC BLOOD PRESSURE: 146 MMHG | TEMPERATURE: 101 F

## 2023-01-21 LAB
ALBUMIN SERPL ELPH-MCNC: 3.8 G/DL — SIGNIFICANT CHANGE UP (ref 3.5–5)
ANION GAP SERPL CALC-SCNC: 9 MMOL/L — SIGNIFICANT CHANGE UP (ref 5–17)
APTT BLD: 21.4 SEC — LOW (ref 27.5–35.5)
BASOPHILS # BLD AUTO: 0.03 K/UL — SIGNIFICANT CHANGE UP (ref 0–0.2)
BASOPHILS NFR BLD AUTO: 0.2 % — SIGNIFICANT CHANGE UP (ref 0–2)
BUN SERPL-MCNC: 16 MG/DL — SIGNIFICANT CHANGE UP (ref 7–18)
CALCIUM SERPL-MCNC: 9 MG/DL — SIGNIFICANT CHANGE UP (ref 8.4–10.5)
CHLORIDE SERPL-SCNC: 106 MMOL/L — SIGNIFICANT CHANGE UP (ref 96–108)
CO2 SERPL-SCNC: 26 MMOL/L — SIGNIFICANT CHANGE UP (ref 22–31)
D DIMER BLD IA.RAPID-MCNC: 170 NG/ML DDU — SIGNIFICANT CHANGE UP
EOSINOPHIL # BLD AUTO: 0.01 K/UL — SIGNIFICANT CHANGE UP (ref 0–0.5)
EOSINOPHIL NFR BLD AUTO: 0.1 % — SIGNIFICANT CHANGE UP (ref 0–6)
GLUCOSE SERPL-MCNC: 130 MG/DL — HIGH (ref 70–99)
HCT VFR BLD CALC: 33.9 % — LOW (ref 34.5–45)
HGB BLD-MCNC: 11.3 G/DL — LOW (ref 11.5–15.5)
IMM GRANULOCYTES NFR BLD AUTO: 0.3 % — SIGNIFICANT CHANGE UP (ref 0–0.9)
INR BLD: 1.09 RATIO — SIGNIFICANT CHANGE UP (ref 0.88–1.16)
LACTATE SERPL-SCNC: 0.8 MMOL/L — SIGNIFICANT CHANGE UP (ref 0.7–2)
LYMPHOCYTES # BLD AUTO: 0.98 K/UL — LOW (ref 1–3.3)
LYMPHOCYTES # BLD AUTO: 8.1 % — LOW (ref 13–44)
MCHC RBC-ENTMCNC: 30.6 PG — SIGNIFICANT CHANGE UP (ref 27–34)
MCHC RBC-ENTMCNC: 33.3 GM/DL — SIGNIFICANT CHANGE UP (ref 32–36)
MCV RBC AUTO: 91.9 FL — SIGNIFICANT CHANGE UP (ref 80–100)
MONOCYTES # BLD AUTO: 0.68 K/UL — SIGNIFICANT CHANGE UP (ref 0–0.9)
MONOCYTES NFR BLD AUTO: 5.6 % — SIGNIFICANT CHANGE UP (ref 2–14)
NEUTROPHILS # BLD AUTO: 10.36 K/UL — HIGH (ref 1.8–7.4)
NEUTROPHILS NFR BLD AUTO: 85.7 % — HIGH (ref 43–77)
NRBC # BLD: 0 /100 WBCS — SIGNIFICANT CHANGE UP (ref 0–0)
PLATELET # BLD AUTO: 219 K/UL — SIGNIFICANT CHANGE UP (ref 150–400)
POTASSIUM SERPL-MCNC: 3.9 MMOL/L — SIGNIFICANT CHANGE UP (ref 3.5–5.3)
POTASSIUM SERPL-SCNC: 3.9 MMOL/L — SIGNIFICANT CHANGE UP (ref 3.5–5.3)
PROTHROM AB SERPL-ACNC: 13 SEC — SIGNIFICANT CHANGE UP (ref 10.5–13.4)
RBC # BLD: 3.69 M/UL — LOW (ref 3.8–5.2)
RBC # FLD: 12.6 % — SIGNIFICANT CHANGE UP (ref 10.3–14.5)
SODIUM SERPL-SCNC: 141 MMOL/L — SIGNIFICANT CHANGE UP (ref 135–145)
WBC # BLD: 12.1 K/UL — HIGH (ref 3.8–10.5)
WBC # FLD AUTO: 12.1 K/UL — HIGH (ref 3.8–10.5)

## 2023-01-21 PROCEDURE — 99284 EMERGENCY DEPT VISIT MOD MDM: CPT

## 2023-01-21 RX ORDER — ACETAMINOPHEN 500 MG
1000 TABLET ORAL ONCE
Refills: 0 | Status: COMPLETED | OUTPATIENT
Start: 2023-01-21 | End: 2023-01-21

## 2023-01-21 RX ORDER — ONDANSETRON 8 MG/1
4 TABLET, FILM COATED ORAL ONCE
Refills: 0 | Status: COMPLETED | OUTPATIENT
Start: 2023-01-21 | End: 2023-01-21

## 2023-01-21 RX ORDER — SODIUM CHLORIDE 9 MG/ML
2000 INJECTION INTRAMUSCULAR; INTRAVENOUS; SUBCUTANEOUS ONCE
Refills: 0 | Status: COMPLETED | OUTPATIENT
Start: 2023-01-21 | End: 2023-01-21

## 2023-01-21 NOTE — ED PROVIDER NOTE - PATIENT PORTAL LINK FT
You can access the FollowMyHealth Patient Portal offered by [facility] by registering at the following website: [email or web address]. By joining [facility]’s FollowMyHealth portal, you will also be able to view your health information using other applications (apps) compatible with our system.

## 2023-01-21 NOTE — ED PROVIDER NOTE - OBJECTIVE STATEMENT
57-year-old female with past medical history asthma (never intubated), chronic back pain on baclofen, prior history of provoked DVT postsurgery (15 years ago, no longer on anticoagulation) presents with flulike symptoms x5 days.  Patient reports rhinorrhea, dry cough, body aches, occasional episodes of nonbloody nonbilious emesis over the past 5 days.  Patient reports chills, states she checked her temperature today was noted to be elevated to 102F.  Patient admits to shortness of breath, reports taking prednisone and using albuterol with little improvement.  Denies any chest pain, abdominal pain, bloody stools, black tarry stools, dysuria, numbness, focal weakness, or rash.  Patient reports taking ibuprofen 800 mg approximately 2 hours ago.  Denies any ill contacts.  Denies any additional complaints.

## 2023-01-21 NOTE — ED PROVIDER NOTE - NSFOLLOWUPINSTRUCTIONS_ED_ALL_ED_FT
Cough    Coughing is a reflex that clears your throat and your airways. Coughing helps to heal and protect your lungs. It is normal to cough occasionally, but a cough that happens with other symptoms or lasts a long time may be a sign of a condition that needs treatment. Coughing may be caused by infections, asthma or COPD, smoking, postnasal drip, gastroesophageal reflux, as well as other medical conditions. Take medicines only as instructed by your health care provider. Avoid environments or triggers that causes you to cough at work or at home.    Follow up with your [facility] physician in 1-3 days.     SEEK IMMEDIATE MEDICAL CARE IF YOU HAVE ANY OF THE FOLLOWING SYMPTOMS: coughing up blood, shortness of breath, rapid heart rate, chest pain, unexplained weight loss or night sweats.

## 2023-01-21 NOTE — ED PROVIDER NOTE - PROGRESS NOTE DETAILS
Yony-DO: called to bedside, pt requesting to leave. Pt verbally aggressive, stating she's walking out with IV in place, code grey called. Offered cough medication and symptomatic treatment, pt declining stating she has been on albuterol and multiple medications for her symptoms. CXR without acute infiltrate. MSE performed, no emergent medical condition identified. This was a difficult patient encounter.  The patient’s expectations as to management were not able to be met given the guidelines by the state, hospital guidelines, and patient’s personal care guidelines.  I discussed at length with the patient my concerns and offered treatment options that were in keeping with our policies and procedures.  Unfortunately, this did not alleviate the patient’s concerns.  At all times myself and staff were respectful of the patient, attempted to solve their issues within our constraints, and treated the patient with all due courtesy.  Unfortunately, the patient left disappointed as to their expectations.  I encouraged them to follow up with their primary care provider for further treatment and to return to the ED if they had any other symptoms or concerns. Lucks-DO: unable to enter wet read on CXR, no acute infiltrate noted. Yony-DO: called to bedside, pt requesting to leave. Pt verbally aggressive, stating she's walking out with IV in place, code grey called. Offered cough medication and symptomatic treatment, pt declining stating she has been on albuterol and multiple medications for her symptoms. CXR without acute infiltrate. MSE performed, no emergent medical condition identified. Mild leukocytosis noted in setting of steroid use, no localizing bacterial infection noted on workup. This was a difficult patient encounter.  The patient’s expectations as to management were not able to be met given the guidelines by the state, hospital guidelines, and patient’s personal care guidelines.  I discussed at length with the patient my concerns and offered treatment options that were in keeping with our policies and procedures.  Unfortunately, this did not alleviate the patient’s concerns.  At all times myself and staff were respectful of the patient, attempted to solve their issues within our constraints, and treated the patient with all due courtesy.  Unfortunately, the patient left disappointed as to their expectations.  I encouraged them to follow up with their primary care provider for further treatment and to return to the ED if they had any other symptoms or concerns.

## 2023-01-21 NOTE — ED PROVIDER NOTE - CLINICAL SUMMARY MEDICAL DECISION MAKING FREE TEXT BOX
Nigel: 57-year-old female with past medical history asthma (never intubated), chronic back pain on baclofen, prior history of provoked DVT postsurgery (15 years ago, no longer on anticoagulation) presents with flulike symptoms x5 days.  Patient reports rhinorrhea, dry cough, body aches, occasional episodes of nonbloody nonbilious emesis over the past 5 days.  Patient reports chills, states she checked her temperature today was noted to be elevated to 102F.  Patient admits to shortness of breath, reports taking prednisone and using albuterol with little improvement.  Denies any chest pain, abdominal pain, bloody stools, black tarry stools, dysuria, numbness, focal weakness, or rash.  Patient reports taking ibuprofen 800 mg approximately 2 hours ago.  Denies any ill contacts.  Likely viral syndrome, lungs CTAB, abdomen nontender. Will obtain labs, imaging r/o PNA, supportive treatment with dispo pending workup.

## 2023-01-21 NOTE — ED PROVIDER NOTE - PHYSICAL EXAMINATION
CONSTITUTIONAL: non-toxic, well appearing  SKIN: no rash, no petechiae.  EYES: pink conjunctiva, anicteric  ENT: tongue and uvular midline, no exudates, moist mucous membranes  NECK: Supple; no meningismus, no JVD  CARD: RRR, no murmurs, equal radial pulses bilaterally 2+  RESP: CTAB, no respiratory distress  ABD: Soft, non-tender, non-distended, no peritoneal signs, no CVA tenderness  EXT: Normal ROM x4. No edema.   NEURO: Alert, oriented. Neuro exam nonfocal, equal strength bilaterally.  PSYCH: Cooperative, appropriate.

## 2023-01-22 VITALS
SYSTOLIC BLOOD PRESSURE: 135 MMHG | DIASTOLIC BLOOD PRESSURE: 77 MMHG | HEART RATE: 97 BPM | OXYGEN SATURATION: 97 % | RESPIRATION RATE: 18 BRPM | TEMPERATURE: 99 F

## 2023-01-22 LAB
ALP SERPL-CCNC: 113 U/L — SIGNIFICANT CHANGE UP (ref 40–120)
ALT FLD-CCNC: 235 U/L DA — HIGH (ref 10–60)
APPEARANCE UR: CLEAR — SIGNIFICANT CHANGE UP
AST SERPL-CCNC: 89 U/L — HIGH (ref 10–40)
BACTERIA # UR AUTO: ABNORMAL /HPF
BILIRUB SERPL-MCNC: 0.6 MG/DL — SIGNIFICANT CHANGE UP (ref 0.2–1.2)
BILIRUB UR-MCNC: NEGATIVE — SIGNIFICANT CHANGE UP
CK SERPL-CCNC: 302 U/L — HIGH (ref 21–215)
COLOR SPEC: YELLOW — SIGNIFICANT CHANGE UP
CREAT SERPL-MCNC: 0.79 MG/DL — SIGNIFICANT CHANGE UP (ref 0.5–1.3)
DIFF PNL FLD: ABNORMAL
EGFR: 88 ML/MIN/1.73M2 — SIGNIFICANT CHANGE UP
EPI CELLS # UR: SIGNIFICANT CHANGE UP /HPF
FLUAV AG NPH QL: SIGNIFICANT CHANGE UP
FLUBV AG NPH QL: SIGNIFICANT CHANGE UP
GLUCOSE UR QL: NEGATIVE — SIGNIFICANT CHANGE UP
HCG SERPL-ACNC: 1 MIU/ML — SIGNIFICANT CHANGE UP
KETONES UR-MCNC: NEGATIVE — SIGNIFICANT CHANGE UP
LEUKOCYTE ESTERASE UR-ACNC: NEGATIVE — SIGNIFICANT CHANGE UP
LIDOCAIN IGE QN: 86 U/L — SIGNIFICANT CHANGE UP (ref 73–393)
NITRITE UR-MCNC: NEGATIVE — SIGNIFICANT CHANGE UP
NT-PROBNP SERPL-SCNC: 51 PG/ML — SIGNIFICANT CHANGE UP (ref 0–125)
PH UR: 7 — SIGNIFICANT CHANGE UP (ref 5–8)
PROT SERPL-MCNC: 8.1 G/DL — SIGNIFICANT CHANGE UP (ref 6–8.3)
PROT UR-MCNC: 15
RBC CASTS # UR COMP ASSIST: ABNORMAL /HPF (ref 0–2)
SARS-COV-2 RNA SPEC QL NAA+PROBE: SIGNIFICANT CHANGE UP
SP GR SPEC: 1 — LOW (ref 1.01–1.02)
TROPONIN I, HIGH SENSITIVITY RESULT: 4.4 NG/L — SIGNIFICANT CHANGE UP
UROBILINOGEN FLD QL: NEGATIVE — SIGNIFICANT CHANGE UP
WBC UR QL: SIGNIFICANT CHANGE UP /HPF (ref 0–5)

## 2023-01-22 PROCEDURE — 71045 X-RAY EXAM CHEST 1 VIEW: CPT

## 2023-01-22 PROCEDURE — 85379 FIBRIN DEGRADATION QUANT: CPT

## 2023-01-22 PROCEDURE — 96374 THER/PROPH/DIAG INJ IV PUSH: CPT

## 2023-01-22 PROCEDURE — 87040 BLOOD CULTURE FOR BACTERIA: CPT

## 2023-01-22 PROCEDURE — 99285 EMERGENCY DEPT VISIT HI MDM: CPT | Mod: 25

## 2023-01-22 PROCEDURE — 82550 ASSAY OF CK (CPK): CPT

## 2023-01-22 PROCEDURE — 84702 CHORIONIC GONADOTROPIN TEST: CPT

## 2023-01-22 PROCEDURE — 81001 URINALYSIS AUTO W/SCOPE: CPT

## 2023-01-22 PROCEDURE — 96375 TX/PRO/DX INJ NEW DRUG ADDON: CPT

## 2023-01-22 PROCEDURE — 85025 COMPLETE CBC W/AUTO DIFF WBC: CPT

## 2023-01-22 PROCEDURE — 83880 ASSAY OF NATRIURETIC PEPTIDE: CPT

## 2023-01-22 PROCEDURE — 71045 X-RAY EXAM CHEST 1 VIEW: CPT | Mod: 26

## 2023-01-22 PROCEDURE — 93005 ELECTROCARDIOGRAM TRACING: CPT

## 2023-01-22 PROCEDURE — 93010 ELECTROCARDIOGRAM REPORT: CPT

## 2023-01-22 PROCEDURE — 84484 ASSAY OF TROPONIN QUANT: CPT

## 2023-01-22 PROCEDURE — 36415 COLL VENOUS BLD VENIPUNCTURE: CPT

## 2023-01-22 PROCEDURE — 83605 ASSAY OF LACTIC ACID: CPT

## 2023-01-22 PROCEDURE — 85730 THROMBOPLASTIN TIME PARTIAL: CPT

## 2023-01-22 PROCEDURE — 83690 ASSAY OF LIPASE: CPT

## 2023-01-22 PROCEDURE — 80053 COMPREHEN METABOLIC PANEL: CPT

## 2023-01-22 PROCEDURE — 87637 SARSCOV2&INF A&B&RSV AMP PRB: CPT

## 2023-01-22 PROCEDURE — 85610 PROTHROMBIN TIME: CPT

## 2023-01-22 PROCEDURE — 87086 URINE CULTURE/COLONY COUNT: CPT

## 2023-01-22 RX ORDER — ONDANSETRON 8 MG/1
4 TABLET, FILM COATED ORAL ONCE
Refills: 0 | Status: COMPLETED | OUTPATIENT
Start: 2023-01-22 | End: 2023-01-22

## 2023-01-22 RX ADMIN — SODIUM CHLORIDE 2000 MILLILITER(S): 9 INJECTION INTRAMUSCULAR; INTRAVENOUS; SUBCUTANEOUS at 00:10

## 2023-01-22 RX ADMIN — Medication 1000 MILLIGRAM(S): at 00:46

## 2023-01-22 RX ADMIN — ONDANSETRON 4 MILLIGRAM(S): 8 TABLET, FILM COATED ORAL at 00:10

## 2023-01-22 RX ADMIN — Medication 400 MILLIGRAM(S): at 00:10

## 2023-01-23 LAB
CULTURE RESULTS: SIGNIFICANT CHANGE UP
SPECIMEN SOURCE: SIGNIFICANT CHANGE UP

## 2023-01-24 NOTE — ED POST DISCHARGE NOTE - RESULT SUMMARY
questionable infiltrate on xray.  Pt called and was able to follow  up with her PMD upon leaving ED and was prescribed abx.

## 2023-01-25 ENCOUNTER — EMERGENCY (EMERGENCY)
Facility: HOSPITAL | Age: 58
LOS: 1 days | Discharge: HOME | End: 2023-01-25
Attending: EMERGENCY MEDICINE | Admitting: EMERGENCY MEDICINE
Payer: MEDICARE

## 2023-01-25 VITALS
RESPIRATION RATE: 16 BRPM | TEMPERATURE: 98 F | HEART RATE: 87 BPM | SYSTOLIC BLOOD PRESSURE: 134 MMHG | DIASTOLIC BLOOD PRESSURE: 90 MMHG | OXYGEN SATURATION: 100 % | HEIGHT: 66 IN

## 2023-01-25 LAB
BASE EXCESS BLDV CALC-SCNC: 5.1 MMOL/L — HIGH (ref -2–3)
BASOPHILS # BLD AUTO: 0.03 K/UL — SIGNIFICANT CHANGE UP (ref 0–0.2)
BASOPHILS NFR BLD AUTO: 0.3 % — SIGNIFICANT CHANGE UP (ref 0–2)
BLOOD GAS VENOUS COMPREHENSIVE RESULT: SIGNIFICANT CHANGE UP
CHLORIDE BLDV-SCNC: 103 MMOL/L — SIGNIFICANT CHANGE UP (ref 96–108)
CO2 BLDV-SCNC: 31.9 MMOL/L — HIGH (ref 22–26)
EOSINOPHIL # BLD AUTO: 0.01 K/UL — SIGNIFICANT CHANGE UP (ref 0–0.5)
EOSINOPHIL NFR BLD AUTO: 0.1 % — SIGNIFICANT CHANGE UP (ref 0–6)
GAS PNL BLDV: 138 MMOL/L — SIGNIFICANT CHANGE UP (ref 136–145)
GAS PNL BLDV: SIGNIFICANT CHANGE UP
GLUCOSE BLDV-MCNC: 118 MG/DL — HIGH (ref 70–99)
HCO3 BLDV-SCNC: 30 MMOL/L — HIGH (ref 22–29)
HCT VFR BLD CALC: 36.1 % — SIGNIFICANT CHANGE UP (ref 34.5–45)
HCT VFR BLDA CALC: 37 % — SIGNIFICANT CHANGE UP (ref 34.5–46.5)
HGB BLD CALC-MCNC: 12.4 G/DL — SIGNIFICANT CHANGE UP (ref 11.5–15.5)
HGB BLD-MCNC: 12 G/DL — SIGNIFICANT CHANGE UP (ref 11.5–15.5)
IANC: 7.61 K/UL — HIGH (ref 1.8–7.4)
IMM GRANULOCYTES NFR BLD AUTO: 1 % — HIGH (ref 0–0.9)
LACTATE BLDV-MCNC: 1.4 MMOL/L — SIGNIFICANT CHANGE UP (ref 0.5–2)
LYMPHOCYTES # BLD AUTO: 1.12 K/UL — SIGNIFICANT CHANGE UP (ref 1–3.3)
LYMPHOCYTES # BLD AUTO: 12.3 % — LOW (ref 13–44)
MCHC RBC-ENTMCNC: 29.9 PG — SIGNIFICANT CHANGE UP (ref 27–34)
MCHC RBC-ENTMCNC: 33.2 GM/DL — SIGNIFICANT CHANGE UP (ref 32–36)
MCV RBC AUTO: 89.8 FL — SIGNIFICANT CHANGE UP (ref 80–100)
MONOCYTES # BLD AUTO: 0.22 K/UL — SIGNIFICANT CHANGE UP (ref 0–0.9)
MONOCYTES NFR BLD AUTO: 2.4 % — SIGNIFICANT CHANGE UP (ref 2–14)
NEUTROPHILS # BLD AUTO: 7.61 K/UL — HIGH (ref 1.8–7.4)
NEUTROPHILS NFR BLD AUTO: 83.9 % — HIGH (ref 43–77)
NRBC # BLD: 0 /100 WBCS — SIGNIFICANT CHANGE UP (ref 0–0)
NRBC # FLD: 0 K/UL — SIGNIFICANT CHANGE UP (ref 0–0)
PCO2 BLDV: 47 MMHG — HIGH (ref 39–42)
PH BLDV: 7.42 — SIGNIFICANT CHANGE UP (ref 7.32–7.43)
PLATELET # BLD AUTO: 301 K/UL — SIGNIFICANT CHANGE UP (ref 150–400)
PO2 BLDV: <20 MMHG — SIGNIFICANT CHANGE UP
POTASSIUM BLDV-SCNC: 4.3 MMOL/L — SIGNIFICANT CHANGE UP (ref 3.5–5.1)
RBC # BLD: 4.02 M/UL — SIGNIFICANT CHANGE UP (ref 3.8–5.2)
RBC # FLD: 12.7 % — SIGNIFICANT CHANGE UP (ref 10.3–14.5)
SAO2 % BLDV: 28.6 % — SIGNIFICANT CHANGE UP
WBC # BLD: 9.08 K/UL — SIGNIFICANT CHANGE UP (ref 3.8–10.5)
WBC # FLD AUTO: 9.08 K/UL — SIGNIFICANT CHANGE UP (ref 3.8–10.5)

## 2023-01-25 PROCEDURE — 71045 X-RAY EXAM CHEST 1 VIEW: CPT | Mod: 26

## 2023-01-25 PROCEDURE — 99285 EMERGENCY DEPT VISIT HI MDM: CPT | Mod: CS,GC

## 2023-01-25 RX ORDER — IPRATROPIUM/ALBUTEROL SULFATE 18-103MCG
3 AEROSOL WITH ADAPTER (GRAM) INHALATION ONCE
Refills: 0 | Status: COMPLETED | OUTPATIENT
Start: 2023-01-25 | End: 2023-01-25

## 2023-01-25 RX ORDER — ACETAMINOPHEN 500 MG
1000 TABLET ORAL ONCE
Refills: 0 | Status: COMPLETED | OUTPATIENT
Start: 2023-01-25 | End: 2023-01-25

## 2023-01-25 RX ORDER — SODIUM CHLORIDE 9 MG/ML
1000 INJECTION INTRAMUSCULAR; INTRAVENOUS; SUBCUTANEOUS ONCE
Refills: 0 | Status: COMPLETED | OUTPATIENT
Start: 2023-01-25 | End: 2023-01-25

## 2023-01-25 RX ORDER — AZITHROMYCIN 500 MG/1
500 TABLET, FILM COATED ORAL ONCE
Refills: 0 | Status: COMPLETED | OUTPATIENT
Start: 2023-01-25 | End: 2023-01-25

## 2023-01-25 RX ORDER — CEFTRIAXONE 500 MG/1
1000 INJECTION, POWDER, FOR SOLUTION INTRAMUSCULAR; INTRAVENOUS ONCE
Refills: 0 | Status: COMPLETED | OUTPATIENT
Start: 2023-01-25 | End: 2023-01-25

## 2023-01-25 RX ORDER — FAMOTIDINE 10 MG/ML
20 INJECTION INTRAVENOUS ONCE
Refills: 0 | Status: COMPLETED | OUTPATIENT
Start: 2023-01-25 | End: 2023-01-25

## 2023-01-25 RX ORDER — ONDANSETRON 8 MG/1
4 TABLET, FILM COATED ORAL ONCE
Refills: 0 | Status: COMPLETED | OUTPATIENT
Start: 2023-01-25 | End: 2023-01-25

## 2023-01-25 RX ADMIN — SODIUM CHLORIDE 1000 MILLILITER(S): 9 INJECTION INTRAMUSCULAR; INTRAVENOUS; SUBCUTANEOUS at 23:44

## 2023-01-25 RX ADMIN — FAMOTIDINE 20 MILLIGRAM(S): 10 INJECTION INTRAVENOUS at 23:44

## 2023-01-25 RX ADMIN — Medication 400 MILLIGRAM(S): at 23:44

## 2023-01-25 RX ADMIN — ONDANSETRON 4 MILLIGRAM(S): 8 TABLET, FILM COATED ORAL at 23:44

## 2023-01-25 RX ADMIN — CEFTRIAXONE 100 MILLIGRAM(S): 500 INJECTION, POWDER, FOR SOLUTION INTRAMUSCULAR; INTRAVENOUS at 23:44

## 2023-01-25 NOTE — ED PROVIDER NOTE - ATTENDING CONTRIBUTION TO CARE
The patient is a 57y Female who has a past medical and surgery history of Asthma (never intubated) HLD provoked  DVT Vertigo UTI pyelitis chronic back pain PTED with Pt. c/o cough fevers, chills, nausea malaise and left-sided chest pain since Saturday, [facility] [place] [facility]; was later diagnosed with PNA on CXR reread and started on Levaquin     Vital Signs Last 24 Hrs  T(F): 98.3 HR: 87 BP: 134/90 RR: 16 SpO2: 100% (25 Jan 2023 20:59) PE: as described; my additions and exceptions are noted in the chart    DATA:  EKG: pending at time of evaluation  LAB: Pending at time of evaluation    IMPRESSION/RISK:  Dx=  Differential includes but not limited to conditions listed in order of most possible first:   Consideration include  Plan  will continue Azithro rocephin  repeat cxr RVP   labs lytes ivfs antiemetics   reassess  dispo as per results and response

## 2023-01-25 NOTE — ED PROVIDER NOTE - NSICDXPASTMEDICALHX_GEN_ALL_CORE_FT
PAST MEDICAL HISTORY:  Asthma     Chronic back pain     DVT (deep venous thrombosis)     High cholesterol     Pyelitis     Urinary tract infection     Vertigo

## 2023-01-25 NOTE — ED PROVIDER NOTE - PHYSICAL EXAMINATION
. GEN - In moderate distress, A&Ox3  HEAD - NC/AT  EYES - PERRL, EOMI  ENT - Airway patent, mucous membranes dry  PULMONARY - Mild wheezing heard diffusely, satting 100% on RA  CARDIAC - +S1S2, RRR, no M/G/R, no JVD  ABDOMEN - ND, NT, soft, no guarding, no rebound, no masses, no rigidity   - No CVA TTP, no suprapubic TTP  EXTREMITIES - FROM, symmetric pulses, no edema, 5/5 strength in b/l UE and LE  SKIN - No rash or bruising  NEUROLOGIC - Alert, speech clear, no focal deficits  PSYCH - Anxious mood/affect, normal insight

## 2023-01-25 NOTE — ED PROVIDER NOTE - OBJECTIVE STATEMENT
The patient is a 57y Female who has a past medical and surgery history of Asthma (never intubated) HLD provoked  DVT Vertigo UTI pyelitis chronic back pain PTED with Pt. c/o cough fevers, chills, nausea malaise and left-sided chest pain since Saturday, states was seen at [place] and [facility] 1/21; was later diagnosed with PNA on CXR reread and started on Levaquin The patient is a 57y Female who has a past medical and surgery history of Asthma (never intubated) HLD provoked  DVT Vertigo UTI pyelitis chronic back pain PTED with Pt. c/o cough fevers, chills, nausea malaise and left-sided chest pain since Saturday, states was seen at [place] and [facility] 1/21; was later diagnosed with PNA on CXR reread and started on Levaquin.

## 2023-01-25 NOTE — ED PROVIDER NOTE - NS ED ROS FT
GENERAL: +fever  EYES: No change in vision  HEENT: No trouble swallowing or speaking  CARDIAC: +chest pain  PULMONARY: +cough and SOB  GI: +nausea/vomiting  : No changes in urination  SKIN: No rashes  NEURO: No headache, no numbness  MSK: No joint pain  Otherwise as HPI or negative.

## 2023-01-25 NOTE — ED ADULT TRIAGE NOTE - CHIEF COMPLAINT QUOTE
Pt. c/o fevers, chills, left-sided chest pain since Saturday, [facility]. Patient tearful in triage.

## 2023-01-25 NOTE — ED PROVIDER NOTE - CLINICAL SUMMARY MEDICAL DECISION MAKING FREE TEXT BOX
The patient is a 57y Female who has a past medical and surgery history of Asthma (never intubated) HLD provoked  DVT Vertigo UTI pyelitis chronic back pain PTED with Pt. c/o cough fevers, chills, nausea malaise and left-sided chest pain since Saturday, states [facility] [place] [facility]; was later diagnosed with PNA on CXR reread and started on Levaquin     Vital Signs Last 24 Hrs  T(F): 98.3 HR: 87 BP: 134/90 RR: 16 SpO2: 100% (25 Jan 2023 20:59) PE: as described; my additions and exceptions are noted in the chart    DATA:  EKG: pending at time of evaluation  LAB: Pending at time of evaluation    IMPRESSION/RISK:  Dx= viral syndrome     Consideration include Reread does not appear significant  for PNA  Plan  will continue Azithro rocephin  repeat cxr RVP   labs lytes ivfs antiemetics   reassess  dispo as per results and response (able to tolerate PO/clinical improvement

## 2023-01-26 LAB
ALBUMIN SERPL ELPH-MCNC: 4.8 G/DL — SIGNIFICANT CHANGE UP (ref 3.3–5)
ALP SERPL-CCNC: 104 U/L — SIGNIFICANT CHANGE UP (ref 40–120)
ALT FLD-CCNC: 121 U/L — HIGH (ref 4–33)
ANION GAP SERPL CALC-SCNC: 14 MMOL/L — SIGNIFICANT CHANGE UP (ref 7–14)
APPEARANCE UR: CLEAR — SIGNIFICANT CHANGE UP
APTT BLD: 30.3 SEC — SIGNIFICANT CHANGE UP (ref 27–36.3)
AST SERPL-CCNC: 33 U/L — HIGH (ref 4–32)
B PERT DNA SPEC QL NAA+PROBE: SIGNIFICANT CHANGE UP
B PERT+PARAPERT DNA PNL SPEC NAA+PROBE: SIGNIFICANT CHANGE UP
BACTERIA # UR AUTO: NEGATIVE — SIGNIFICANT CHANGE UP
BILIRUB SERPL-MCNC: 0.4 MG/DL — SIGNIFICANT CHANGE UP (ref 0.2–1.2)
BILIRUB UR-MCNC: NEGATIVE — SIGNIFICANT CHANGE UP
BORDETELLA PARAPERTUSSIS (RAPRVP): SIGNIFICANT CHANGE UP
BUN SERPL-MCNC: 13 MG/DL — SIGNIFICANT CHANGE UP (ref 7–23)
C PNEUM DNA SPEC QL NAA+PROBE: SIGNIFICANT CHANGE UP
CALCIUM SERPL-MCNC: 10 MG/DL — SIGNIFICANT CHANGE UP (ref 8.4–10.5)
CHLORIDE SERPL-SCNC: 100 MMOL/L — SIGNIFICANT CHANGE UP (ref 98–107)
CO2 SERPL-SCNC: 25 MMOL/L — SIGNIFICANT CHANGE UP (ref 22–31)
COLOR SPEC: SIGNIFICANT CHANGE UP
CREAT SERPL-MCNC: 0.6 MG/DL — SIGNIFICANT CHANGE UP (ref 0.5–1.3)
DIFF PNL FLD: ABNORMAL
EGFR: 105 ML/MIN/1.73M2 — SIGNIFICANT CHANGE UP
EPI CELLS # UR: 1 /HPF — SIGNIFICANT CHANGE UP (ref 0–5)
FLUAV SUBTYP SPEC NAA+PROBE: SIGNIFICANT CHANGE UP
FLUBV RNA SPEC QL NAA+PROBE: SIGNIFICANT CHANGE UP
GLUCOSE SERPL-MCNC: 121 MG/DL — HIGH (ref 70–99)
GLUCOSE UR QL: NEGATIVE — SIGNIFICANT CHANGE UP
HADV DNA SPEC QL NAA+PROBE: SIGNIFICANT CHANGE UP
HCOV 229E RNA SPEC QL NAA+PROBE: SIGNIFICANT CHANGE UP
HCOV HKU1 RNA SPEC QL NAA+PROBE: SIGNIFICANT CHANGE UP
HCOV NL63 RNA SPEC QL NAA+PROBE: SIGNIFICANT CHANGE UP
HCOV OC43 RNA SPEC QL NAA+PROBE: SIGNIFICANT CHANGE UP
HMPV RNA SPEC QL NAA+PROBE: SIGNIFICANT CHANGE UP
HPIV1 RNA SPEC QL NAA+PROBE: SIGNIFICANT CHANGE UP
HPIV2 RNA SPEC QL NAA+PROBE: SIGNIFICANT CHANGE UP
HPIV3 RNA SPEC QL NAA+PROBE: SIGNIFICANT CHANGE UP
HPIV4 RNA SPEC QL NAA+PROBE: SIGNIFICANT CHANGE UP
INR BLD: 1.21 RATIO — HIGH (ref 0.88–1.16)
KETONES UR-MCNC: ABNORMAL
LEUKOCYTE ESTERASE UR-ACNC: NEGATIVE — SIGNIFICANT CHANGE UP
LIDOCAIN IGE QN: 18 U/L — SIGNIFICANT CHANGE UP (ref 7–60)
M PNEUMO DNA SPEC QL NAA+PROBE: SIGNIFICANT CHANGE UP
MAGNESIUM SERPL-MCNC: 2 MG/DL — SIGNIFICANT CHANGE UP (ref 1.6–2.6)
NITRITE UR-MCNC: NEGATIVE — SIGNIFICANT CHANGE UP
NT-PROBNP SERPL-SCNC: 69 PG/ML — SIGNIFICANT CHANGE UP
PH UR: 6.5 — SIGNIFICANT CHANGE UP (ref 5–8)
POTASSIUM SERPL-MCNC: 4.2 MMOL/L — SIGNIFICANT CHANGE UP (ref 3.5–5.3)
POTASSIUM SERPL-SCNC: 4.2 MMOL/L — SIGNIFICANT CHANGE UP (ref 3.5–5.3)
PROT SERPL-MCNC: 8.4 G/DL — HIGH (ref 6–8.3)
PROT UR-MCNC: NEGATIVE — SIGNIFICANT CHANGE UP
PROTHROM AB SERPL-ACNC: 14.1 SEC — HIGH (ref 10.5–13.4)
RAPID RVP RESULT: SIGNIFICANT CHANGE UP
RBC CASTS # UR COMP ASSIST: 2 /HPF — SIGNIFICANT CHANGE UP (ref 0–4)
RSV RNA SPEC QL NAA+PROBE: SIGNIFICANT CHANGE UP
RV+EV RNA SPEC QL NAA+PROBE: SIGNIFICANT CHANGE UP
SARS-COV-2 RNA SPEC QL NAA+PROBE: SIGNIFICANT CHANGE UP
SODIUM SERPL-SCNC: 139 MMOL/L — SIGNIFICANT CHANGE UP (ref 135–145)
SP GR SPEC: 1.01 — SIGNIFICANT CHANGE UP (ref 1.01–1.05)
TROPONIN T, HIGH SENSITIVITY RESULT: <6 NG/L — SIGNIFICANT CHANGE UP
UROBILINOGEN FLD QL: SIGNIFICANT CHANGE UP
WBC UR QL: 0 /HPF — SIGNIFICANT CHANGE UP (ref 0–5)

## 2023-01-26 PROCEDURE — 99223 1ST HOSP IP/OBS HIGH 75: CPT | Mod: FS,CS

## 2023-01-26 RX ORDER — AZITHROMYCIN 500 MG/1
500 TABLET, FILM COATED ORAL EVERY 24 HOURS
Refills: 0 | Status: DISCONTINUED | OUTPATIENT
Start: 2023-01-26 | End: 2023-01-29

## 2023-01-26 RX ORDER — ONDANSETRON 8 MG/1
4 TABLET, FILM COATED ORAL EVERY 4 HOURS
Refills: 0 | Status: DISCONTINUED | OUTPATIENT
Start: 2023-01-26 | End: 2023-01-29

## 2023-01-26 RX ORDER — FAMOTIDINE 10 MG/ML
20 INJECTION INTRAVENOUS EVERY 12 HOURS
Refills: 0 | Status: DISCONTINUED | OUTPATIENT
Start: 2023-01-26 | End: 2023-01-29

## 2023-01-26 RX ORDER — SODIUM CHLORIDE 9 MG/ML
1000 INJECTION, SOLUTION INTRAVENOUS
Refills: 0 | Status: DISCONTINUED | OUTPATIENT
Start: 2023-01-26 | End: 2023-01-27

## 2023-01-26 RX ORDER — CEFTRIAXONE 500 MG/1
1000 INJECTION, POWDER, FOR SOLUTION INTRAMUSCULAR; INTRAVENOUS EVERY 24 HOURS
Refills: 0 | Status: DISCONTINUED | OUTPATIENT
Start: 2023-01-26 | End: 2023-01-29

## 2023-01-26 RX ADMIN — SODIUM CHLORIDE 125 MILLILITER(S): 9 INJECTION, SOLUTION INTRAVENOUS at 18:28

## 2023-01-26 RX ADMIN — CEFTRIAXONE 100 MILLIGRAM(S): 500 INJECTION, POWDER, FOR SOLUTION INTRAMUSCULAR; INTRAVENOUS at 23:19

## 2023-01-26 RX ADMIN — AZITHROMYCIN 500 MILLIGRAM(S): 500 TABLET, FILM COATED ORAL at 11:36

## 2023-01-26 RX ADMIN — FAMOTIDINE 20 MILLIGRAM(S): 10 INJECTION INTRAVENOUS at 18:28

## 2023-01-26 RX ADMIN — SODIUM CHLORIDE 125 MILLILITER(S): 9 INJECTION, SOLUTION INTRAVENOUS at 07:38

## 2023-01-26 RX ADMIN — AZITHROMYCIN 255 MILLIGRAM(S): 500 TABLET, FILM COATED ORAL at 01:45

## 2023-01-26 RX ADMIN — Medication 100 MILLIGRAM(S): at 10:36

## 2023-01-26 RX ADMIN — AZITHROMYCIN 255 MILLIGRAM(S): 500 TABLET, FILM COATED ORAL at 10:36

## 2023-01-26 RX ADMIN — Medication 3 MILLILITER(S): at 00:09

## 2023-01-26 NOTE — ED CDU PROVIDER INITIAL DAY NOTE - OBJECTIVE STATEMENT
56 yo female, PMH asthma (no intubation hx), chronic back pain (on baclofen), prior history of provoked DVT postsurgery (15 years ago, no longer on anticoagulation) presented to the ED c/o cough, chest pain with cough, nausea, and malaise.  Pt was seen at [facility] on 1/[facility] for c/o flulike symptoms x 5 days; CXR showed a questionable nodule/?infiltrate; pt was started on Levaquin by PMD after pt left ED AMA.  Patient reports rhinorrhea, dry cough, body aches, occasional episodes of nonbloody nonbilious emesis over the past 5 days, chills, and fevers to 102.  In the ED on the current visit, VSS, pt afebrile.  Labs: WBC 9.08, CMP with no actionable findings, trop <6, RVP/COVID: NotDetected; CXR negative for acute pathology.  Per ED Providers, pt verbalized concern over being discharged and felt she needed additional IV hydration and antiemetics PRN; pt was dispo'd to CDU for supportive care measures.

## 2023-01-26 NOTE — ED ADULT NURSE NOTE - CHIEF COMPLAINT QUOTE
Pt. c/o fevers, chills, left-sided chest pain since Saturday, [facility]. Patient tearful in triage.

## 2023-01-26 NOTE — ED ADULT NURSE NOTE - OBJECTIVE STATEMENT
BIBEMS from home reporting increasing pleuritic CP from cough, recent diagnosis at [facility] of pneumonia, reporting subjective fevers, HA, N/V and lethargy. PIV inserted to L AC, labs drawn and sent, medicated as ordered. Safety maintained, needs attended, will continue to monitor.

## 2023-01-26 NOTE — ED ADULT NURSE NOTE - NSICDXPASTSURGICALHX_GEN_ALL_CORE_FT
[Follow Up] : follow up GYN visit [FreeTextEntry1] : strong fishy odor for the last few weeks most prevalent after sex and menstruation PAST SURGICAL HISTORY:  No significant past surgical history

## 2023-01-26 NOTE — ED CDU PROVIDER INITIAL DAY NOTE - ATTENDING APP SHARED VISIT CONTRIBUTION OF CARE
I have made the decision to admit this patient to the CDU as documented in my Provider note I have reviewed the note  written by Toma Moody  [facility], on that visit day. I have supervised and participated as necessary in the performance of procedures indicated for patient management and was available at all phases of the patient´s visit when needed.    Please see my provider note for the details of my decision to admit  Vital Signs Last 24 Hrs  T(F): 98.8 HR: 78 BP: 116/63 RR: 16 SpO2: 99% (26 Jan 2023 09:03) (99% - 100%)  PE unchanged at time of admission  Patient admitted to CDU for IV hydration, supportive care, general observation care monitoring after expressing concerns regarding needs for further hydration nausea and ability to tolerate PO  Goals:  Will observe for clinical improvement and PO tolerance. Probalbe d/c  today

## 2023-01-26 NOTE — ED CDU PROVIDER INITIAL DAY NOTE - CLINICAL SUMMARY MEDICAL DECISION MAKING FREE TEXT BOX
IV hydration, supportive care, general observation care / monitoring, goal for clinical improvement and PO tolerance.

## 2023-01-26 NOTE — ED CDU PROVIDER INITIAL DAY NOTE - NS ED ATTENDING STATEMENT MOD
This was a shared visit with the KENZIE. I reviewed and verified the documentation and independently performed the documented:

## 2023-01-26 NOTE — ED CDU PROVIDER INITIAL DAY NOTE - PROGRESS NOTE DETAILS
Pt reports nausea improved.  Pt offers no new complaints at this time.  Will continue to observe and treat with antibiotics and provide supportive care as needed for her symptoms.

## 2023-01-27 VITALS
DIASTOLIC BLOOD PRESSURE: 53 MMHG | SYSTOLIC BLOOD PRESSURE: 114 MMHG | RESPIRATION RATE: 17 BRPM | TEMPERATURE: 98 F | OXYGEN SATURATION: 97 % | HEART RATE: 67 BPM

## 2023-01-27 LAB
CULTURE RESULTS: NO GROWTH — SIGNIFICANT CHANGE UP
CULTURE RESULTS: SIGNIFICANT CHANGE UP
CULTURE RESULTS: SIGNIFICANT CHANGE UP
SPECIMEN SOURCE: SIGNIFICANT CHANGE UP

## 2023-01-27 PROCEDURE — 99238 HOSP IP/OBS DSCHRG MGMT 30/<: CPT

## 2023-01-27 RX ORDER — ONDANSETRON 8 MG/1
1 TABLET, FILM COATED ORAL
Qty: 20 | Refills: 0
Start: 2023-01-27 | End: 2023-01-31

## 2023-01-27 RX ORDER — SODIUM CHLORIDE 9 MG/ML
1000 INJECTION, SOLUTION INTRAVENOUS
Refills: 0 | Status: DISCONTINUED | OUTPATIENT
Start: 2023-01-27 | End: 2023-01-29

## 2023-01-27 RX ORDER — HYDROCODONE BITARTRATE AND HOMATROPINE METHYLBROMIDE 5; 1.5 MG/5ML; MG/5ML
5 SOLUTION ORAL
Qty: 60 | Refills: 0
Start: 2023-01-27

## 2023-01-27 RX ADMIN — FAMOTIDINE 20 MILLIGRAM(S): 10 INJECTION INTRAVENOUS at 06:06

## 2023-01-27 RX ADMIN — AZITHROMYCIN 255 MILLIGRAM(S): 500 TABLET, FILM COATED ORAL at 09:42

## 2023-01-27 NOTE — ED CDU PROVIDER SUBSEQUENT DAY NOTE - ATTENDING APP SHARED VISIT CONTRIBUTION OF CARE
56 yo female, PMH asthma (no intubation hx), chronic back pain (on baclofen), prior history of provoked DVT postsurgery (15 years ago, no longer on anticoagulation) presented to the ED c/o cough, chest pain with cough, nausea, and malaise.  Pt was seen at [facility] on 1/21 for c/o flulike symptoms x 5 days; CXR showed a questionable nodule/?infiltrate; pt was started on Levaquin by PMD after pt left ED AMA.  Patient reports rhinorrhea, dry cough, body aches, occasional episodes of nonbloody nonbilious emesis over the past 5 days, chills, and fevers to 102.  In the ED on the current visit, VSS, pt afebrile.  Labs: WBC 9.08, CMP with no actionable findings, trop <6, RVP/COVID: NotDetected; CXR negative for acute pathology.  Per ED Providers, pt verbalized concern over being discharged and felt she needed additional IV hydration and antiemetics PRN; pt was dispo'd to CDU for supportive care measures.  In the interim, pt objectively noted to be resting comfortably; pt has been clinically stable; no issues thus far.

## 2023-01-27 NOTE — ED CDU PROVIDER DISPOSITION NOTE - NSFOLLOWUPINSTRUCTIONS_ED_ALL_ED_FT
Upper Respiratory Infection, Adult    An upper respiratory infection (URI) affects the nose, throat, and upper air passages. URIs are caused by germs (viruses). The most common type of URI is often called "the common cold."    Medicines cannot cure URIs, but you can do things at home to relieve your symptoms. URIs usually get better within 7–10 days.    Follow these instructions at home:  Activity    Rest as needed.  If you have a fever, stay home from work or school until your fever is gone, or until your doctor says you may return to work or school.  You should stay home until you cannot spread the infection anymore (you are not contagious).  Your doctor may have you wear a face mask so you have less risk of spreading the infection.    Relieving symptoms    Gargle with a salt-water mixture 3–4 times a day or as needed. To make a salt-water mixture, completely dissolve ½–1 tsp of salt in 1 cup of warm water.  Use a cool-mist humidifier to add moisture to the air. This can help you breathe more easily.    Eating and drinking    Drink enough fluid to keep your pee (urine) pale yellow.  Eat soups and other clear broths.     General instructions    Take over-the-counter and prescription medicines only as told by your doctor. These include cold medicines, fever reducers, and cough suppressants.  Do not use any products that contain nicotine or tobacco. These include cigarettes and e-cigarettes. If you need help quitting, ask your doctor.  Avoid being where people are smoking (avoid secondhand smoke).  Make sure you get regular shots and get the flu shot every year.  Keep all follow-up visits as told by your doctor. This is important.     How to avoid spreading infection to others    Wash your hands often with soap and water. If you do not have soap and water, use hand .  Avoid touching your mouth, face, eyes, or nose.  Cough or sneeze into a tissue or your sleeve or elbow. Do not cough or sneeze into your hand or into the air.     Contact a doctor if:  You are getting worse, not better.  You have any of these:  A fever.  Chills.  Brown or red mucus in your nose.  Yellow or brown fluid (discharge)coming from your nose.  Pain in your face, especially when you bend forward.  Swollen neck glands.  Pain with swallowing.  White areas in the back of your throat.    Get help right away if:  You have shortness of breath that gets worse.  You have very bad or constant:  Headache.  Ear pain.  Pain in your forehead, behind your eyes, and over your cheekbones (sinus pain).  Chest pain.  You have long-lasting (chronic) lung disease along with any of these:  Wheezing.  Long-lasting cough.  Coughing up blood.  A change in your usual mucus.  You have a stiff neck.  You have changes in your:  Vision.  Hearing.  Thinking.  Mood.    Summary  An upper respiratory infection (URI) is caused by a germ called a virus. The most common type of URI is often called "the common cold."  URIs usually get better within 7–10 days.  Take over-the-counter and prescription medicines only as told by your doctor.    ADDITIONAL NOTES AND INSTRUCTIONS    Please follow up with your Primary MD in 24-48 hr.  Seek immediate medical care for any new/worsening signs or symptoms.     Infección de las vías respiratorias superiores, en adultos    Garcia infección de las vías respiratorias superiores (URI) afecta la nariz, la garganta y las vías respiratorias superiores. Los URI son causados ??por gérmenes (virus). El tipo más común de URI a menudo se denomina "resfriado común".    Los medicamentos no pueden curar las infecciones respiratorias superiores, mikayla puede hacer cosas en casa para aliviar mary kay síntomas. Las URI generalmente mejoran en 7 a 10 días.    Siga estas instrucciones en casa:  Actividad    Descanse según sea necesario.  Si tiene fiebre, quédese en casa y no vaya al trabajo ni a la escuela hasta que le desaparezca la fiebre o hasta que funk médico le diga que puede regresar al trabajo o la escuela.  Debe quedarse en casa hasta que ya no pueda propagar la infección (ya no es contagioso).  Es posible que funk médico le pida que use garcia mascarilla para que tenga menos riesgo de propagar la infección.    Aliviar los síntomas    Reanna gárgaras con garcia mezcla de agua salada de 3 a 4 veces al día o según sea necesario. Para hacer garcia mezcla de agua salada, disuelva completamente entre ½ y 1 cucharadita de sal en 1 taza de agua tibia.  Use un humidificador de vapor frío para agregar humedad al aire. [place] puede ayudarlo a respirar más fácilmente.    Comiendo y bebiendo    Maren suficiente líquido para mantener funk pipí (orina) de color amarillo pálido.  Come sopas y otros caldos roni.    Instrucciones generales    Wingo medicamentos de venta claudia y recetados solo según las indicaciones de funk médico. Estos incluyen medicamentos para el resfriado, antifebriles y antitusígenos.  No use ningún producto que contenga nicotina o tabaco. Estos incluyen cigarrillos y cigarrillos electrónicos. Si necesita ayuda para dejar de fumar, consulte con funk médico.  Evite estar donde la gente fuma (evite el humo de segunda mano).  Asegúrese de recibir vacunas regulares y vacunarse contra la gripe todos los años.  Asista a todas las visitas de seguimiento que le indique funk médico. [place] es importante.    Cómo evitar transmitir la infección a otras personas    Lávese las marivel frecuentemente con agua y jabón. Si no tiene agua y jabón, use desinfectante para marivel.  Evite tocarse la boca, la marvin, los ojos o la nariz.  Tosa o estornude en un pañuelo de papel, en la manga o en el codo. No tosa ni estornude en funk mano o en el aire.    Comuníquese con un médico si:  Estás empeorando, no mejorando.  Tienes alguno de estos:  Fiebre  Escalofríos.  Moco marrón o rudd en la nariz.  Fluido (secreción) amarillo o marrón que sale de la nariz.  Dolor en la marvin, especialmente cuando se inclina hacia adelante.  Glándulas del luisito hinchadas.  Dolor al tragar.  Áreas tracee en la parte posterior de la garganta.    Obtenga ayuda de inmediato si:  Tiene dificultad para respirar que empeora.  Tienes muy yeyo o moncho:  Dolor de ortiz.  Dolor de oído.  Dolor en la frente, detrás de los ojos y sobre los pómulos (dolor de los senos nasales).  Dolor en el pecho.  Tiene garcia enfermedad pulmonar prolongada (crónica) junto con cualquiera de los siguientes:  Sibilancias.  Tos de larga duración.  Tosiendo sindy.  Un cambio en funk moco habitual.  Tienes rigidez en el luisito.  Tiene cambios en funk:  Visión.  Escuchando.  Pensando.  Estado animico.    Resumen  Garcia infección de las vías respiratorias superiores (URI) es causada por un germen llamado virus. El tipo más común de URI a menudo se denomina "resfriado común".  Las URI generalmente mejoran en 7 a 10 días.  Wingo medicamentos de venta claudia y recetados solo según las indicaciones de funk médico.    NOTAS E INSTRUCCIONES ADICIONALES    Reanna un seguimiento con funk médico de cabecera en 24-48 horas.  Busque atención médica inmediata ante cualquier signo o síntoma nuevo o que empeore.

## 2023-01-27 NOTE — ED CDU PROVIDER DISPOSITION NOTE - ATTENDING APP SHARED VISIT CONTRIBUTION OF CARE
58 yo female, PMH asthma (no intubation hx), chronic back pain (on baclofen), prior history of provoked DVT postsurgery (15 years ago, no longer on anticoagulation) presented to the ED c/o cough, chest pain with cough, nausea, and malaise.  Pt was seen at [facility] on 1/21 for c/o flulike symptoms x 5 days; CXR showed a questionable nodule/?infiltrate; pt was started on Levaquin by PMD after pt left ED AMA.  Patient reports rhinorrhea, dry cough, body aches, occasional episodes of nonbloody nonbilious emesis over the past 5 days, chills, and fevers to 102.  In the ED on the current visit, VSS, pt afebrile.  Labs: WBC 9.08, CMP with no actionable findings, trop <6, RVP/COVID: NotDetected; CXR negative for acute pathology.  Per ED Providers, pt verbalized concern over being discharged and felt she needed additional IV hydration and antiemetics PRN; pt was dispo'd to CDU for supportive care measures. Pt. reassessed - feeling better, stable for DC with outpt follow up.

## 2023-01-27 NOTE — ED CDU PROVIDER DISPOSITION NOTE - CLINICAL COURSE
58 yo female, PMH asthma (no intubation hx), chronic back pain (on baclofen), prior history of provoked DVT postsurgery (15 years ago, no longer on anticoagulation) presented to the ED c/o cough, chest pain with cough, nausea, and malaise.  Pt was seen at [facility] on 1/21 for c/o flulike symptoms x 5 days; CXR showed a questionable nodule/?infiltrate; pt was started on Levaquin by PMD after pt left ED AMA.  Patient reports rhinorrhea, dry cough, body aches, occasional episodes of nonbloody nonbilious emesis over the past 5 days, chills, and fevers to 102.  In the ED on the current visit, VSS, pt afebrile.  Labs: WBC 9.08, CMP with no actionable findings, trop <6, RVP/COVID: NotDetected; CXR negative for acute pathology.  Per ED Providers, pt verbalized concern over being discharged and felt she needed additional IV hydration and antiemetics PRN; pt was dispo'd to CDU for supportive care measures. Pt. reassessed - feeling better, stable for DC with outpt follow up.

## 2023-01-27 NOTE — ED CDU PROVIDER SUBSEQUENT DAY NOTE - HISTORY
58 yo female, PMH asthma (no intubation hx), chronic back pain (on baclofen), prior history of provoked DVT postsurgery (15 years ago, no longer on anticoagulation) presented to the ED c/o cough, chest pain with cough, nausea, and malaise.  Pt was seen at [facility] on 1/21 for c/o flulike symptoms x 5 days; CXR showed a questionable nodule/?infiltrate; pt was started on Levaquin by PMD after pt left ED AMA.  Patient reports rhinorrhea, dry cough, body aches, occasional episodes of nonbloody nonbilious emesis over the past 5 days, chills, and fevers to 102.  In the ED on the current visit, VSS, pt afebrile.  Labs: WBC 9.08, CMP with no actionable findings, trop <6, RVP/COVID: NotDetected; CXR negative for acute pathology.  Per ED Providers, pt verbalized concern over being discharged and felt she needed additional IV hydration and antiemetics PRN; pt was dispo'd to CDU for supportive care measures.  In the interim, pt objectively noted to be resting comfortably; pt has been clinically stable; no issues thus far.

## 2023-01-27 NOTE — ED CDU PROVIDER DISPOSITION NOTE - PATIENT PORTAL LINK FT
Awake/Alert You can access the FollowMyHealth Patient Portal offered by [facility] by registering at the following website: [email or web address]. By joining [employer]’s FollowMyHealth portal, you will also be able to view your health information using other applications (apps) compatible with our system.

## 2023-03-15 NOTE — ED ADULT NURSE NOTE - OBJECTIVE STATEMENT
Called pt N/A, LVM   
Called pt N/A, LVM to return call   
Pt complaining of difficulty breathing since 5 days. Pt mentions, " I have ashthma and I take nebulization at home."

## 2023-08-07 NOTE — ED PROVIDER NOTE - BIRTH SEX
Female
Kobe Orellana  Orthopaedic Surgery  [address] [place] [address]  Phone: [phone]  Fax: [phone]  Follow Up Time:     Pratik Andrew  Podiatric Medicine and Surgery  [address] [place] [id number]  Phone: [phone]  Fax: [phone]  Follow Up Time:

## 2023-09-06 ENCOUNTER — APPOINTMENT (OUTPATIENT)
Dept: ORTHOPEDIC SURGERY | Facility: CLINIC | Age: 58
End: 2023-09-06

## 2024-02-27 PROBLEM — M54.9 DORSALGIA, UNSPECIFIED: Chronic | Status: ACTIVE | Noted: 2023-01-26

## 2024-02-27 PROBLEM — J45.909 UNSPECIFIED ASTHMA, UNCOMPLICATED: Chronic | Status: ACTIVE | Noted: 2023-01-26

## 2024-03-04 ENCOUNTER — APPOINTMENT (OUTPATIENT)
Dept: ORTHOPEDIC SURGERY | Facility: CLINIC | Age: 59
End: 2024-03-04
Payer: MEDICARE

## 2024-03-04 VITALS — WEIGHT: 190 LBS | HEIGHT: 67 IN | BODY MASS INDEX: 29.82 KG/M2

## 2024-03-04 DIAGNOSIS — M17.12 UNILATERAL PRIMARY OSTEOARTHRITIS, LEFT KNEE: ICD-10-CM

## 2024-03-04 PROCEDURE — 73564 X-RAY EXAM KNEE 4 OR MORE: CPT | Mod: LT

## 2024-03-04 PROCEDURE — 99204 OFFICE O/P NEW MOD 45 MIN: CPT

## 2024-03-12 ENCOUNTER — NON-APPOINTMENT (OUTPATIENT)
Age: 59
End: 2024-03-12

## 2024-03-26 ENCOUNTER — APPOINTMENT (OUTPATIENT)
Dept: ORTHOPEDIC SURGERY | Facility: CLINIC | Age: 59
End: 2024-03-26

## 2024-03-28 ENCOUNTER — APPOINTMENT (OUTPATIENT)
Dept: ORTHOPEDIC SURGERY | Facility: CLINIC | Age: 59
End: 2024-03-28
Payer: MEDICARE

## 2024-03-28 VITALS — WEIGHT: 190 LBS | HEIGHT: 67 IN | BODY MASS INDEX: 29.82 KG/M2

## 2024-03-28 DIAGNOSIS — M25.561 PAIN IN RIGHT KNEE: ICD-10-CM

## 2024-03-28 DIAGNOSIS — M17.11 UNILATERAL PRIMARY OSTEOARTHRITIS, RIGHT KNEE: ICD-10-CM

## 2024-03-28 DIAGNOSIS — M25.562 PAIN IN RIGHT KNEE: ICD-10-CM

## 2024-03-28 DIAGNOSIS — G89.29 PAIN IN RIGHT KNEE: ICD-10-CM

## 2024-03-28 PROCEDURE — 73564 X-RAY EXAM KNEE 4 OR MORE: CPT | Mod: 50

## 2024-03-28 PROCEDURE — 99204 OFFICE O/P NEW MOD 45 MIN: CPT

## 2024-03-28 NOTE — HISTORY OF PRESENT ILLNESS
[de-identified] : 58 year old  female experiencing  pain in both knees for approximately 2 months now.  She denies any specific accident or injury.  Here for second opinion.  The pain limits activities of daily living. Walking tolerance is reduced.  She takes ibuprofen as needed.  She has not tried cortisone injections.  She is asking questions about glucosamine and conjoint sulfate.  The patient denies any radiation of the pain to the feet and it is not associated with numbness, tingling, or weakness.

## 2024-03-28 NOTE — PHYSICAL EXAM
[de-identified] : Patient is well nourished, well-developed, in no acute distress, with appropriate mood and affect. The patient is oriented to time, place, and person. Respirations are even and unlabored.  Gait evaluation does reveal a limp. There is no inguinal adenopathy. The bilateral limbs are well-perfused, without skin lesions, shows a grossly normal motor and sensory examination.  Bilateral knee motion causes significant pain and is tender to palpation in the patellofemoral joint line. Positive patella grind and patellofemoral compression testing. There is no tenderness to palpation in the medial or lateral side of the knee. The bilateral knee moves from 0 to 130 degrees. The knee is stable within that range-of-motion to AP and ML stress. The alignment of the knee is 5 degrees varus. Muscle strength is normal. Pedal pulses are palpable. Hip examination was negative. [de-identified] : Long standing knee, AP knee, lateral knee, and patellar views of the bilateral knee were ordered and taken in the office and demonstrate mild degenerative joint disease of the patellofemoral compartment of the knee with joint space narrowing, osteophyte formation, and subchondral sclerosis.

## 2024-03-28 NOTE — DISCUSSION/SUMMARY
[de-identified] :  This patient has mild bilateral knee patellofemoral osteoarthritis but has severe pain.  The patient is not an appropriate candidate for surgical intervention at this time. An extensive discussion was conducted on the natural history of the disease and the variety of surgical and non-surgical options available to the patient including, but not limited to non-steroidal anti-inflammatory medications, steroid injections, physical therapy, maintenance of ideal body weight, and reduction of activity.  I recommended a prescription for meloxicam which she refused.  I recommended a prescription for physical therapy which she refused.  I recommended bilateral knee intra-articular cortisone injections or hyaluronic acid injections which she refused.  She asked about glucosamine and conjoint sulfate and I told her that the AAOS does not recommend this medication.  I recommend she try neoprene sleeves.  Weight loss recommended. The patient will schedule an appointment as needed.

## 2024-03-29 PROBLEM — M17.12 PATELLOFEMORAL ARTHRITIS OF LEFT KNEE: Status: ACTIVE | Noted: 2024-03-28

## 2024-03-29 NOTE — HISTORY OF PRESENT ILLNESS
[de-identified] : 57 y/o female with presents today with left knee pain x 2 weeks. No injury reported. Patient woke up with knee pain 2 weeks ago had difficulty with walking and stairs. Painhas improved she has some residual pain with full flexion and extension. C/O swelling, stiffness and heaviness. Motrin/Tylenol provided relief.  Localize pain to both anterior and posterior aspect of the knee. S/p left knee meniscectomy 10 years. Also, c/o right knee pain.

## 2024-03-29 NOTE — ADDENDUM
[FreeTextEntry1] : This note was written by Rober Curran on 03/04/2024 acting solely as a scribe for Dr. Raudel Alarcon.  All medical record entries made by the Scribe were at my, Dr. Raudel Alarcon, direction and personally dictated by me on 03/04/2024. I have personally reviewed the chart and agree that the record accurately reflects my personal performance of the history, physical exam, assessment and plan.

## 2024-03-29 NOTE — DISCUSSION/SUMMARY
[de-identified] : 57 y/o female with left knee patellofemoral chondrosis  The patient presents for evaluation of degenerative knee pain. Presentation is consistent with early degenerative change and arthrosis to the knee. Described that this is likely due to overuse, and age-related "wear and tear". Pain may be related to breakdown of the chondral surfaces, cartilage, or ligaments of the knee. Radiographs show minimal arthrosis, with moderate chondral breakdown within the patellofemoral joint on MRI imaging.  Symptoms are acute, and we discussed conservative management.  Future treatment may include injection therapy (cortisone vs. visco-supplementation).  She is not a surgical candidate.  Recommendation: Conservative care & observation; including rest/activity avoidance until less symptomatic with subsequent gradual return to full low impact activity as tolerated. Patient may also use OTC NSAID's or acetaminophen as tolerated, with application of ice/heat to the area 2-3x daily for 20 minutes after periods of activity. Trial of PT provided.  Followup As needed

## 2024-03-29 NOTE — PHYSICAL EXAM
[de-identified] : left knee exam  Skin: Clean, dry, intact Inspection: No obvious malalignment, no masses, no swelling, no effusion Pulses: 2+ DP/PT pulses ROM: 0-130 degrees of flexion. No pain with deep knee flexion/extension. Tenderness: No MJLT. No LJLT. No pain over the patella facets. No pain to the quadriceps tendon. No pain to the patella tendon. No posterior knee tenderness. Stability: Stable to varus, valgus. Negative lachman testing. Negative anterior drawer, negative posterior drawer. Strength: 5/5 Q/H/TA/GS/EHL, without atrophy Neuro: In tact to light touch throughout, DTR's normal Additional tests: Negative McMurrays test, + patellar grind test.  [de-identified] : The following radiographs were ordered and read by me during this patients visit. I reviewed each radiograph in detail with the patient and discussed the findings as highlighted below.   4 views of the left and right knee were obtained today, 03/04/2024, that show no acute fracture or dislocation. There is no medial, no lateral and no patellofemoral degenerative changes seen. There is no significant malalignment. No significant other obvious osseous abnormality, otherwise unremarkable.  MRI left knee dated 03/04/2024 shows patellofemoral chondromalacia with full-thickness chondral wear along the superior median ridge extending into the adjacent medial lateral patella facets. No MRI evidence of meniscal tear or ligamentous injury.

## 2024-07-31 ENCOUNTER — INPATIENT (INPATIENT)
Facility: HOSPITAL | Age: 59
LOS: 1 days | Discharge: HOME | DRG: 552 | End: 2024-08-02
Attending: INTERNAL MEDICINE | Admitting: INTERNAL MEDICINE
Payer: MEDICARE

## 2024-07-31 VITALS
HEIGHT: 67 IN | HEART RATE: 77 BPM | DIASTOLIC BLOOD PRESSURE: 83 MMHG | TEMPERATURE: 98 F | RESPIRATION RATE: 18 BRPM | SYSTOLIC BLOOD PRESSURE: 133 MMHG | WEIGHT: 179.9 LBS | OXYGEN SATURATION: 97 %

## 2024-07-31 DIAGNOSIS — M54.9 DORSALGIA, UNSPECIFIED: ICD-10-CM

## 2024-07-31 DIAGNOSIS — N39.0 URINARY TRACT INFECTION, SITE NOT SPECIFIED: ICD-10-CM

## 2024-07-31 DIAGNOSIS — K21.9 GASTRO-ESOPHAGEAL REFLUX DISEASE WITHOUT ESOPHAGITIS: ICD-10-CM

## 2024-07-31 DIAGNOSIS — Z29.9 ENCOUNTER FOR PROPHYLACTIC MEASURES, UNSPECIFIED: ICD-10-CM

## 2024-07-31 LAB
ALBUMIN SERPL ELPH-MCNC: 4.3 G/DL — SIGNIFICANT CHANGE UP (ref 3.5–5)
ALP SERPL-CCNC: 71 U/L — SIGNIFICANT CHANGE UP (ref 40–120)
ALT FLD-CCNC: 65 U/L DA — HIGH (ref 10–60)
ANION GAP SERPL CALC-SCNC: 9 MMOL/L — SIGNIFICANT CHANGE UP (ref 5–17)
APPEARANCE UR: CLEAR — SIGNIFICANT CHANGE UP
AST SERPL-CCNC: 30 U/L — SIGNIFICANT CHANGE UP (ref 10–40)
BACTERIA # UR AUTO: ABNORMAL /HPF
BASOPHILS # BLD AUTO: 0.03 K/UL — SIGNIFICANT CHANGE UP (ref 0–0.2)
BASOPHILS NFR BLD AUTO: 0.4 % — SIGNIFICANT CHANGE UP (ref 0–2)
BILIRUB SERPL-MCNC: 0.3 MG/DL — SIGNIFICANT CHANGE UP (ref 0.2–1.2)
BILIRUB UR-MCNC: NEGATIVE — SIGNIFICANT CHANGE UP
BUN SERPL-MCNC: 23 MG/DL — HIGH (ref 7–18)
CALCIUM SERPL-MCNC: 9.5 MG/DL — SIGNIFICANT CHANGE UP (ref 8.4–10.5)
CHLORIDE SERPL-SCNC: 105 MMOL/L — SIGNIFICANT CHANGE UP (ref 96–108)
CO2 SERPL-SCNC: 26 MMOL/L — SIGNIFICANT CHANGE UP (ref 22–31)
COLOR SPEC: YELLOW — SIGNIFICANT CHANGE UP
CREAT SERPL-MCNC: 0.86 MG/DL — SIGNIFICANT CHANGE UP (ref 0.5–1.3)
DIFF PNL FLD: ABNORMAL
EGFR: 79 ML/MIN/1.73M2 — SIGNIFICANT CHANGE UP
EOSINOPHIL # BLD AUTO: 0.1 K/UL — SIGNIFICANT CHANGE UP (ref 0–0.5)
EOSINOPHIL NFR BLD AUTO: 1.3 % — SIGNIFICANT CHANGE UP (ref 0–6)
EPI CELLS # UR: PRESENT
GLUCOSE SERPL-MCNC: 125 MG/DL — HIGH (ref 70–99)
GLUCOSE UR QL: NEGATIVE MG/DL — SIGNIFICANT CHANGE UP
HCT VFR BLD CALC: 35.9 % — SIGNIFICANT CHANGE UP (ref 34.5–45)
HGB BLD-MCNC: 12.1 G/DL — SIGNIFICANT CHANGE UP (ref 11.5–15.5)
IMM GRANULOCYTES NFR BLD AUTO: 0.3 % — SIGNIFICANT CHANGE UP (ref 0–0.9)
KETONES UR-MCNC: NEGATIVE MG/DL — SIGNIFICANT CHANGE UP
LEUKOCYTE ESTERASE UR-ACNC: ABNORMAL
LYMPHOCYTES # BLD AUTO: 2.32 K/UL — SIGNIFICANT CHANGE UP (ref 1–3.3)
LYMPHOCYTES # BLD AUTO: 29.4 % — SIGNIFICANT CHANGE UP (ref 13–44)
MCHC RBC-ENTMCNC: 30.3 PG — SIGNIFICANT CHANGE UP (ref 27–34)
MCHC RBC-ENTMCNC: 33.7 GM/DL — SIGNIFICANT CHANGE UP (ref 32–36)
MCV RBC AUTO: 90 FL — SIGNIFICANT CHANGE UP (ref 80–100)
MONOCYTES # BLD AUTO: 0.61 K/UL — SIGNIFICANT CHANGE UP (ref 0–0.9)
MONOCYTES NFR BLD AUTO: 7.7 % — SIGNIFICANT CHANGE UP (ref 2–14)
NEUTROPHILS # BLD AUTO: 4.81 K/UL — SIGNIFICANT CHANGE UP (ref 1.8–7.4)
NEUTROPHILS NFR BLD AUTO: 60.9 % — SIGNIFICANT CHANGE UP (ref 43–77)
NITRITE UR-MCNC: NEGATIVE — SIGNIFICANT CHANGE UP
NRBC # BLD: 0 /100 WBCS — SIGNIFICANT CHANGE UP (ref 0–0)
PH UR: 6 — SIGNIFICANT CHANGE UP (ref 5–8)
PLATELET # BLD AUTO: 232 K/UL — SIGNIFICANT CHANGE UP (ref 150–400)
POTASSIUM SERPL-MCNC: 4.2 MMOL/L — SIGNIFICANT CHANGE UP (ref 3.5–5.3)
POTASSIUM SERPL-SCNC: 4.2 MMOL/L — SIGNIFICANT CHANGE UP (ref 3.5–5.3)
PROT SERPL-MCNC: 8 G/DL — SIGNIFICANT CHANGE UP (ref 6–8.3)
PROT UR-MCNC: NEGATIVE MG/DL — SIGNIFICANT CHANGE UP
RBC # BLD: 3.99 M/UL — SIGNIFICANT CHANGE UP (ref 3.8–5.2)
RBC # FLD: 12.1 % — SIGNIFICANT CHANGE UP (ref 10.3–14.5)
RBC CASTS # UR COMP ASSIST: 10 /HPF — HIGH (ref 0–4)
SODIUM SERPL-SCNC: 140 MMOL/L — SIGNIFICANT CHANGE UP (ref 135–145)
SP GR SPEC: 1.02 — SIGNIFICANT CHANGE UP (ref 1–1.03)
UROBILINOGEN FLD QL: 0.2 MG/DL — SIGNIFICANT CHANGE UP (ref 0.2–1)
WBC # BLD: 7.89 K/UL — SIGNIFICANT CHANGE UP (ref 3.8–10.5)
WBC # FLD AUTO: 7.89 K/UL — SIGNIFICANT CHANGE UP (ref 3.8–10.5)
WBC UR QL: 5 /HPF — SIGNIFICANT CHANGE UP (ref 0–5)

## 2024-07-31 PROCEDURE — 99285 EMERGENCY DEPT VISIT HI MDM: CPT

## 2024-07-31 PROCEDURE — 99222 1ST HOSP IP/OBS MODERATE 55: CPT

## 2024-07-31 RX ORDER — PANTOPRAZOLE SODIUM 20 MG/1
40 TABLET, DELAYED RELEASE ORAL
Refills: 0 | Status: DISCONTINUED | OUTPATIENT
Start: 2024-07-31 | End: 2024-08-02

## 2024-07-31 RX ORDER — GUAIFEN/P-PROPANOLAMIN/CODEINE
1 EXPECTORANT ORAL
Refills: 0 | DISCHARGE

## 2024-07-31 RX ORDER — BACLOFEN 10 MG/1
1 TABLET ORAL
Refills: 0 | DISCHARGE

## 2024-07-31 RX ORDER — ACETAMINOPHEN 500 MG
1000 TABLET ORAL ONCE
Refills: 0 | Status: COMPLETED | OUTPATIENT
Start: 2024-07-31 | End: 2024-07-31

## 2024-07-31 RX ORDER — METOCLOPRAMIDE HCL 5 MG/ML
10 VIAL (ML) INJECTION ONCE
Refills: 0 | Status: COMPLETED | OUTPATIENT
Start: 2024-07-31 | End: 2024-07-31

## 2024-07-31 RX ORDER — KETOROLAC TROMETHAMINE 10 MG
30 TABLET ORAL EVERY 8 HOURS
Refills: 0 | Status: DISCONTINUED | OUTPATIENT
Start: 2024-07-31 | End: 2024-08-02

## 2024-07-31 RX ORDER — ACETAMINOPHEN 500 MG
1000 TABLET ORAL ONCE
Refills: 0 | Status: COMPLETED | OUTPATIENT
Start: 2024-08-01 | End: 2024-08-01

## 2024-07-31 RX ORDER — DOXEPIN HYDROCHLORIDE 50 MG/1
1 CAPSULE ORAL
Refills: 0 | DISCHARGE

## 2024-07-31 RX ORDER — METHOCARBAMOL 500 MG
1500 TABLET ORAL ONCE
Refills: 0 | Status: COMPLETED | OUTPATIENT
Start: 2024-07-31 | End: 2024-07-31

## 2024-07-31 RX ORDER — ENOXAPARIN SODIUM 120 MG/.8ML
40 INJECTION SUBCUTANEOUS EVERY 24 HOURS
Refills: 0 | Status: DISCONTINUED | OUTPATIENT
Start: 2024-07-31 | End: 2024-08-02

## 2024-07-31 RX ORDER — LIDOCAINE 5% 5 G/100G
1 CREAM TOPICAL DAILY
Refills: 0 | Status: DISCONTINUED | OUTPATIENT
Start: 2024-07-31 | End: 2024-08-02

## 2024-07-31 RX ORDER — CYCLOBENZAPRINE HCL 10 MG
5 TABLET ORAL EVERY 8 HOURS
Refills: 0 | Status: DISCONTINUED | OUTPATIENT
Start: 2024-07-31 | End: 2024-07-31

## 2024-07-31 RX ORDER — DULOXETINE HCL 20 MG
1 CAPSULE,DELAYED RELEASE (ENTERIC COATED) ORAL
Refills: 0 | DISCHARGE

## 2024-07-31 RX ORDER — METHOCARBAMOL 500 MG
750 TABLET ORAL EVERY 8 HOURS
Refills: 0 | Status: DISCONTINUED | OUTPATIENT
Start: 2024-07-31 | End: 2024-08-01

## 2024-07-31 RX ORDER — NAPROXEN 250 MG
1 TABLET ORAL
Qty: 14 | Refills: 0
Start: 2024-07-31

## 2024-07-31 RX ORDER — GABAPENTIN 400 MG/1
1 CAPSULE ORAL
Refills: 0 | DISCHARGE

## 2024-07-31 RX ORDER — TRAMADOL HCL 50 MG
50 TABLET ORAL EVERY 8 HOURS
Refills: 0 | Status: DISCONTINUED | OUTPATIENT
Start: 2024-07-31 | End: 2024-08-02

## 2024-07-31 RX ORDER — GABAPENTIN 400 MG/1
400 CAPSULE ORAL EVERY 8 HOURS
Refills: 0 | Status: DISCONTINUED | OUTPATIENT
Start: 2024-07-31 | End: 2024-08-02

## 2024-07-31 RX ORDER — ONDANSETRON HCL/PF 4 MG/2 ML
4 VIAL (ML) INJECTION EVERY 4 HOURS
Refills: 0 | Status: DISCONTINUED | OUTPATIENT
Start: 2024-07-31 | End: 2024-08-02

## 2024-07-31 RX ORDER — DEXLANSOPRAZOLE 60 MG/1
1 CAPSULE, DELAYED RELEASE PELLETS ORAL
Refills: 0 | DISCHARGE

## 2024-07-31 RX ORDER — LORATADINE 10 MG
17 TABLET,DISINTEGRATING ORAL DAILY
Refills: 0 | Status: DISCONTINUED | OUTPATIENT
Start: 2024-07-31 | End: 2024-08-02

## 2024-07-31 RX ORDER — GABAPENTIN 400 MG/1
300 CAPSULE ORAL THREE TIMES A DAY
Refills: 0 | Status: DISCONTINUED | OUTPATIENT
Start: 2024-07-31 | End: 2024-07-31

## 2024-07-31 RX ORDER — TRAMADOL HCL 50 MG
1 TABLET ORAL
Refills: 0 | DISCHARGE

## 2024-07-31 RX ORDER — METHOCARBAMOL 500 MG
2 TABLET ORAL
Qty: 24 | Refills: 0
Start: 2024-07-31

## 2024-07-31 RX ORDER — METHIONINE
1 POWDER (GRAM) MISCELLANEOUS
Refills: 0 | DISCHARGE

## 2024-07-31 RX ORDER — KETOROLAC TROMETHAMINE 10 MG
15 TABLET ORAL ONCE
Refills: 0 | Status: DISCONTINUED | OUTPATIENT
Start: 2024-07-31 | End: 2024-07-31

## 2024-07-31 RX ORDER — IBUPROFEN 200 MG
2 TABLET ORAL
Refills: 0 | DISCHARGE

## 2024-07-31 RX ORDER — UMECLIDINIUM 62.5 UG/1
1 AEROSOL, POWDER ORAL
Refills: 0 | DISCHARGE

## 2024-07-31 RX ORDER — LIDOCAINE 5% 5 G/100G
1 CREAM TOPICAL ONCE
Refills: 0 | Status: COMPLETED | OUTPATIENT
Start: 2024-07-31 | End: 2024-07-31

## 2024-07-31 RX ORDER — ONDANSETRON HCL/PF 4 MG/2 ML
1 VIAL (ML) INJECTION
Qty: 6 | Refills: 0
Start: 2024-07-31

## 2024-07-31 RX ORDER — DULOXETINE HCL 20 MG
60 CAPSULE,DELAYED RELEASE (ENTERIC COATED) ORAL DAILY
Refills: 0 | Status: DISCONTINUED | OUTPATIENT
Start: 2024-07-31 | End: 2024-08-02

## 2024-07-31 RX ORDER — ASPIRIN 325 MG
5 TABLET ORAL DAILY
Refills: 0 | Status: DISCONTINUED | OUTPATIENT
Start: 2024-07-31 | End: 2024-08-02

## 2024-07-31 RX ORDER — ACETAMINOPHEN 500 MG
1000 TABLET ORAL EVERY 8 HOURS
Refills: 0 | Status: DISCONTINUED | OUTPATIENT
Start: 2024-07-31 | End: 2024-07-31

## 2024-07-31 RX ORDER — SENNOSIDES 8.6 MG/1
2 TABLET ORAL AT BEDTIME
Refills: 0 | Status: DISCONTINUED | OUTPATIENT
Start: 2024-07-31 | End: 2024-08-02

## 2024-07-31 RX ORDER — NALOXONE HYDROCHLORIDE 0.4 MG/ML
0.4 INJECTION, SOLUTION INTRAMUSCULAR; INTRAVENOUS; SUBCUTANEOUS ONCE
Refills: 0 | Status: DISCONTINUED | OUTPATIENT
Start: 2024-07-31 | End: 2024-08-02

## 2024-07-31 RX ADMIN — Medication 60 MILLIGRAM(S): at 17:09

## 2024-07-31 RX ADMIN — Medication 17 GRAM(S): at 13:21

## 2024-07-31 RX ADMIN — GABAPENTIN 400 MILLIGRAM(S): 400 CAPSULE ORAL at 21:27

## 2024-07-31 RX ADMIN — SENNOSIDES 2 TABLET(S): 8.6 TABLET ORAL at 21:29

## 2024-07-31 RX ADMIN — Medication 15 MILLIGRAM(S): at 05:28

## 2024-07-31 RX ADMIN — Medication 400 MILLIGRAM(S): at 13:20

## 2024-07-31 RX ADMIN — Medication 1000 MILLIGRAM(S): at 14:30

## 2024-07-31 RX ADMIN — Medication 30 MILLIGRAM(S): at 22:55

## 2024-07-31 RX ADMIN — Medication 750 MILLIGRAM(S): at 13:20

## 2024-07-31 RX ADMIN — Medication 4 MILLIGRAM(S): at 19:56

## 2024-07-31 RX ADMIN — Medication 10 MILLIGRAM(S): at 04:46

## 2024-07-31 RX ADMIN — Medication 400 MILLIGRAM(S): at 03:00

## 2024-07-31 RX ADMIN — GABAPENTIN 400 MILLIGRAM(S): 400 CAPSULE ORAL at 13:25

## 2024-07-31 RX ADMIN — Medication 15 MILLIGRAM(S): at 03:00

## 2024-07-31 RX ADMIN — Medication 4 MILLIGRAM(S): at 05:16

## 2024-07-31 RX ADMIN — Medication 4 MILLIGRAM(S): at 05:28

## 2024-07-31 RX ADMIN — Medication 4 MILLIGRAM(S): at 04:46

## 2024-07-31 RX ADMIN — LIDOCAINE 5% 1 PATCH: 5 CREAM TOPICAL at 19:28

## 2024-07-31 RX ADMIN — Medication 1500 MILLIGRAM(S): at 03:00

## 2024-07-31 RX ADMIN — LIDOCAINE 5% 1 PATCH: 5 CREAM TOPICAL at 03:00

## 2024-07-31 RX ADMIN — Medication 30 MILLIGRAM(S): at 13:20

## 2024-07-31 RX ADMIN — LIDOCAINE 5% 1 PATCH: 5 CREAM TOPICAL at 13:21

## 2024-07-31 RX ADMIN — PANTOPRAZOLE SODIUM 40 MILLIGRAM(S): 20 TABLET, DELAYED RELEASE ORAL at 13:22

## 2024-07-31 RX ADMIN — Medication 750 MILLIGRAM(S): at 21:31

## 2024-07-31 RX ADMIN — Medication 30 MILLIGRAM(S): at 14:31

## 2024-07-31 RX ADMIN — Medication 30 MILLIGRAM(S): at 21:27

## 2024-07-31 RX ADMIN — Medication 1000 MILLIGRAM(S): at 05:28

## 2024-07-31 RX ADMIN — Medication 4 MILLIGRAM(S): at 02:59

## 2024-07-31 NOTE — ED PROVIDER NOTE - PATIENT PORTAL LINK FT
You can access the FollowMyHealth Patient Portal offered by [facility] by registering at the following website: [email or web address]. By joining [employer]’s FollowMyHealth portal, you will also be able to view your health information using other applications (apps) compatible with our system.

## 2024-07-31 NOTE — H&P ADULT - NSHPPHYSICALEXAM_GEN_ALL_CORE
GENERAL: In acute distress due to pain; crying   HEAD:  Atraumatic, Normocephalic  EYES: EOMI, PERRLA, conjunctiva and sclera clear  ENMT: No tonsillar erythema, exudates, or enlargement; Moist mucous membranes, No lesions  NECK: Supple, normal appearance, No JVD; Normal thyroid; Trachea midline  NERVOUS SYSTEM:  Alert & Oriented X3,  Motor Strength 5/5 B/L upper and 2/5 lower extremities; movement limited due to pain; sensation intact, CN II-XII intact.   CHEST/LUNG: Lungs clear to auscultation bilaterally, No rales, rhonchi, wheezing   HEART: Regular rate and rhythm; No murmurs, rubs, or gallops  ABDOMEN: Soft, Nontender, Nondistended; Bowel sounds present  : No suprapubic tenderness, CVAT;  EXTREMITIES:  2+ Peripheral Pulses, 1+ LE edema; No clubbing, cyanosis  LYMPH: No lymphadenopathy noted  SKIN: No rashes or lesions;  Moderate capillary refill

## 2024-07-31 NOTE — PATIENT PROFILE ADULT - LEGAL HELP
Interval History: Received 4.5/6hrs of planned SLED treatment due to clotting. Appropriate clearance seen on morning labs. 350ml of recorded UOP in the past 24hrs.     Objective:     Vital Signs (Most Recent):  Temp: 98.5 °F (36.9 °C) (08/23/23 0800)  Pulse: (!) 147 (08/23/23 1000)  Resp: (!) 44 (08/23/23 1000)  BP: (!) 101/58 (08/23/23 1000)  SpO2: (!) 90 % (08/23/23 1000) Vital Signs (24h Range):  Temp:  [97.6 °F (36.4 °C)-98.5 °F (36.9 °C)] 98.5 °F (36.9 °C)  Pulse:  [134-154] 147  Resp:  [25-50] 44  SpO2:  [90 %-96 %] 90 %  BP: ()/(48-58) 101/58     Weight: 66.1 kg (145 lb 11.6 oz) (08/22/23 1700)  Body mass index is 22.09 kg/m².  Body surface area is 1.78 meters squared.    I/O last 3 completed shifts:  In: 2017.6 [P.O.:924; I.V.:1093.6]  Out: 1380 [Urine:550; Other:830]     Physical Exam  Vitals and nursing note reviewed.   Constitutional:       Appearance: Normal appearance.   HENT:      Head: Normocephalic and atraumatic.      Nose: Nose normal.      Mouth/Throat:      Mouth: Mucous membranes are moist.      Pharynx: Oropharynx is clear.   Eyes:      Conjunctiva/sclera: Conjunctivae normal.   Cardiovascular:      Rate and Rhythm: Regular rhythm. Tachycardia present.      Heart sounds: Murmur heard.      Comments: RIJ trialysis c/d/I  Pulmonary:      Effort: Pulmonary effort is normal.   Abdominal:      General: Abdomen is flat.      Palpations: Abdomen is soft.   Musculoskeletal:         General: Normal range of motion.      Cervical back: Normal range of motion.      Comments: Muscle wasting    Skin:     General: Skin is warm and dry.      Comments: Sternotomy scar    Neurological:      General: No focal deficit present.      Mental Status: He is alert and oriented to person, place, and time.   Psychiatric:         Mood and Affect: Mood normal.         Behavior: Behavior normal.          Significant Labs:  CBC:   Recent Labs   Lab 08/19/23  2335   WBC 6.52   RBC 3.78*   HGB 7.8*   HCT 26.0*       MCV 69*   MCH 20.6*   MCHC 30.0*       CMP:   Recent Labs   Lab 08/23/23  0754   *   CALCIUM 8.6*   ALBUMIN 3.3   PROT 6.1   *   K 3.6   CO2 22*   CL 95   BUN 59*   CREATININE 3.0*   ALKPHOS 209*   ALT <5*   AST 17   BILITOT 0.5       All labs within the past 24 hours have been reviewed.    Significant Imaging:  Labs: Reviewed  Echo: Reviewed   no

## 2024-07-31 NOTE — H&P ADULT - ASSESSMENT
Patient is a 58F, from home ambulates with cane, PMHx of chronic back pain, GERD, pre-DM and frequent UTI, who presents with 5 day history of intractable back pain. Neuro exam nonfocal, no evidence of cauda equina syndrome or cord compression. Admitted for intractable back pain.

## 2024-07-31 NOTE — ED PROVIDER NOTE - PROGRESS NOTE DETAILS
Yony-: pt seen and re-evaluated at bedside.  Pt states their symptoms have improved.  Pt comfortable in NAD.  Pain improved, pt ambulatory with cane, pt requesting DC. Patient requesting Rx for Zofran stating her tramadol causes nausea. Will DC with spine follow up/return precautions, pt understood and agreeable with plan. Nathanielks-DO: pt with persistent severe back pain, will admit for pain control.

## 2024-07-31 NOTE — H&P ADULT - NSHPREVIEWOFSYSTEMS_GEN_ALL_CORE
CONSTITUTIONAL: No fever, weight loss, or fatigue  EYES: No eye pain, visual disturbances, or discharge  ENT:  No difficulty hearing, tinnitus, vertigo; No sinus or throat pain  NECK: No pain or stiffness  RESPIRATORY: No cough, wheezing, chills or hemoptysis; No Shortness of Breath  CARDIOVASCULAR: No chest pain, palpitations, passing out, dizziness, or leg swelling  GASTROINTESTINAL: +constipation; No abdominal or epigastric pain. No nausea, vomiting, or hematemesis; No diarrhea. No melena or hematochezia.  GENITOURINARY: +dysuria; No frequency, hematuria, or incontinence  NEUROLOGICAL: No headaches, memory loss, loss of strength, numbness, or tremors  SKIN: No itching, burning, rashes, or lesions   LYMPH Nodes: No enlarged glands  MUSCULOSKELETAL: Mid-lower back pain; No joint pain or swelling; No extremity pain  PSYCHIATRIC: No depression, anxiety, mood swings, or difficulty sleeping  HEME/LYMPH: No easy bruising, or bleeding gums  ALLERGY AND IMMUNOLOGIC: No hives or eczema

## 2024-07-31 NOTE — ED PROVIDER NOTE - CLINICAL SUMMARY MEDICAL DECISION MAKING FREE TEXT BOX
Nigel: 58-year-old female with past medical history lumbar herniated disks, hyperlipidemia presents with low back pain x 5 days.  Patient reports bilateral low back pain radiating to bilateral thighs over the past 5 days, worse with movement and position change.  Denies any recent injury or trauma.  Patient also reports burning urination over the past week.  Denies any fevers, chest pain, shortness of breath, vomiting, numbness, weakness, saddle anesthesia, bowel/bladder dysfunction, or rash.  Denies any aspirin anticoagulation use.  Patient reports taking tramadol, Flexeril, and ibuprofen with little improvement.  Physical exam per above. Neuro exam nonfocal, no evidence of cauda equina syndrome or cord compression. Will obtain labs r/o UTI, provide supportive treatment with dispo pending workup.

## 2024-07-31 NOTE — CONSULT NOTE ADULT - SUBJECTIVE AND OBJECTIVE BOX
Source of information: LAYNE BONILLA, Chart review  Patient language: English  : n/a    HPI:  Patient is a 58F, from home ambulates with cane, PMHx of chronic back pain, GERD, pre-DM and frequent UTI, who presents with 5 day history of intractable back pain. Patient woke up on Friday morning with mid lower back pain that radiated down to the leg and frontal ingunial area. She took tramadol 50mg BID, ibuprofen 800mg qd, baclofen 10mg for her back pain but the pain did not resolve so she presented to the ED. Patient also reports burning on urination for the last 10 days. Patient reports the pain is so severe that she is not able to move at all or even lift her legs to ambulate. Patient denies urinary or stool incontinence, saddle numbness or leg numbness. She denies leg numbness or weakness and reports the difficulty ambulation is strictly due to pain. Patient endorses constipation but denies other systemic symptoms including headahce, fever, chills, nausea, vomiting, abdominal pain, arm weakness/numbness, chest pain, palpitation. Patient denies any trauma to the back, reports no out of ordinary exercise at physical therapy last Monday. Patient reports only the morphine helped in the ED.     In ED   T 97.5F  HR 66, /78   RR 18, O2 95% in RA  (2024 11:26)    Pt is admitted for intractable back pain with ambulatory dysfunction. Pain service consulted on  for management of back pain. Pt seen and examined at bedside, this afternoon in the presence of her daughter Sherita. At time of assessment, patient laying in bed with lumbar back brace on, A&Ox4 in NAD, reports pain score 7/10 SCALE USED: (1-10 VNRS). Per patient, she has history of chronic back pain x several years. Five days ago patient reports feeling a sharp pain down her thoracic and lumbar spine which has gotten progressively worse, she denied injury or bowel/bladder dysfunction. Pt describes pain as localized to thoracic and lumbar spine with radiation down bilateral lower extremities, constant, sharp, shooting, and cramping in quality. The pain is currently mildly alleviated by pain medication and is exacerbated by movement. Pt tolerating PO diet. Denies lethargy, chest pain, SOB, nausea, vomiting, constipation. She endorses dysuria, and reports history of frequent UTI. Reports last BM . Patient stated goal for pain control: to be able to take deep breaths, get out of bed to chair and ambulate with tolerable pain control. Pt ambulates with a straight cane at baseline and takes Gabapentin, Baclofen, Ibuprofen, and Tramadol at home for pain. She follows Dr. Elizondo as outpatient for pain management.     PAST MEDICAL & SURGICAL HISTORY:  High cholesterol    DVT (deep venous thrombosis)    Vertigo    Urinary tract infection    Pyelitis    Asthma    Chronic back pain    No significant past surgical history    FAMILY HISTORY:    Social History:  Lives at home with  (2024 11:26)  [x] Denies ETOH use, illicit drug use and smoking    Allergies    No Known Allergies    Intolerances    MEDICATIONS  (STANDING):  acetaminophen   IVPB .. 1000 milliGRAM(s) IV Intermittent once  DULoxetine 60 milliGRAM(s) Oral daily  enoxaparin Injectable 40 milliGRAM(s) SubCutaneous every 24 hours  gabapentin 400 milliGRAM(s) Oral every 8 hours  ketorolac   Injectable 30 milliGRAM(s) IV Push every 8 hours  lidocaine   4% Patch 1 Patch Transdermal daily  methocarbamol 750 milliGRAM(s) Oral every 8 hours  naloxone Injectable 0.4 milliGRAM(s) IV Push once  pantoprazole    Tablet 40 milliGRAM(s) Oral before breakfast  polyethylene glycol 3350 17 Gram(s) Oral daily  senna 2 Tablet(s) Oral at bedtime    MEDICATIONS  (PRN):  bisacodyl 5 milliGRAM(s) Oral daily PRN Constipation  ondansetron Injectable 4 milliGRAM(s) IV Push every 4 hours PRN Nausea and/or Vomiting  traMADol 50 milliGRAM(s) Oral every 8 hours PRN Severe Pain (7 - 10)    Vital Signs Last 24 Hrs  T(C): 36.7 (2024 12:23), Max: 36.7 (2024 12:23)  T(F): 98 (2024 12:23), Max: 98 (2024 12:23)  HR: 63 (2024 12:23) (63 - 77)  BP: 127/68 (2024 12:23) (117/78 - 133/83)  BP(mean): --  RR: 14 (2024 12:23) (14 - 18)  SpO2: 96% (2024 12:23) (95% - 97%)    Parameters below as of 2024 12:23  Patient On (Oxygen Delivery Method): room air    LABS: Reviewed.                          12.1   7.89  )-----------( 232      ( 2024 03:00 )             35.9     07-31    140  |  105  |  23<H>  ----------------------------<  125<H>  4.2   |  26  |  0.86    Ca    9.5      2024 03:00    TPro  8.0  /  Alb  4.3  /  TBili  0.3  /  DBili  x   /  AST  30  /  ALT  65<H>  /  AlkPhos  71  0731    LIVER FUNCTIONS - ( 2024 03:00 )  Alb: 4.3 g/dL / Pro: 8.0 g/dL / ALK PHOS: 71 U/L / ALT: 65 U/L DA / AST: 30 U/L / GGT: x           Urinalysis Basic - ( 2024 03:00 )    Color: Yellow / Appearance: Clear / S.020 / pH: x  Gluc: 125 mg/dL / Ketone: Negative mg/dL  / Bili: Negative / Urobili: 0.2 mg/dL   Blood: x / Protein: Negative mg/dL / Nitrite: Negative   Leuk Esterase: Small / RBC: 10 /HPF / WBC 5 /HPF   Sq Epi: x / Non Sq Epi: x / Bacteria: Few /HPF    CAPILLARY BLOOD GLUCOSE    Radiology: None to Review.     ORT Score -   Family Hx of substance abuse	Female	      Male  Alcohol 	                                           1                     3  Illegal drugs	                                   2                     3  Rx drugs                                           4 	                  4  Personal Hx of substance abuse		  Alcohol 	                                          3	                  3  Illegal drugs                                     4	                  4  Rx drugs                                            5 	                  5  Age between 16- 45 years	           1                     1  hx preadolescent sexual abuse	   3 	                  0  Psychological disease		  ADD, OCD, bipolar, schizophrenia   2	          2  Depression                                           1 	          1  Total: 0    a score of 3 or lower indicates low risk for opioid abuse		  a score of 4-7 indicates moderate risk for opioid abuse		  a score of 8 or higher indicates high risk for opioid abuse  	  REVIEW OF SYSTEMS:  CONSTITUTIONAL: No fever or fatigue  HEENT:  No difficulty hearing, no change in vision  NECK: No pain or stiffness  RESPIRATORY: No cough, wheezing, chills or hemoptysis; No shortness of breath  CARDIOVASCULAR: No chest pain, palpitations, dizziness, or leg swelling  GASTROINTESTINAL: No loss of appetite, decreased PO intake. No abdominal or epigastric pain. No nausea, vomiting; No diarrhea or constipation.   GENITOURINARY: + dysuria, no frequency, hematuria, retention or incontinence  MUSCULOSKELETAL: + back pain, + bilateral lower extremity pain, + upper or lower motor strength weakness secondary to pain, no saddle anesthesia, bowel/bladder incontinence, no falls   NEURO: No headaches, + bilateral lower extremity pain   ENDOCRINE: No polyuria, polydipsia, heat or cold intolerance; No hair loss  PSYCHIATRIC: No depression, anxiety or difficulty sleeping    PHYSICAL EXAM:  GENERAL:  Alert & Oriented X4, cooperative, NAD, Good concentration. Speech is clear.   RESPIRATORY: Respirations even and unlabored. Clear to auscultation bilaterally  CARDIOVASCULAR: Normal S1/S2, regular rate and rhythm  GASTROINTESTINAL:  Soft, Nontender, Nondistended; Bowel sounds present  PERIPHERAL VASCULAR:  Extremities warm without edema. 2+ Peripheral Pulses, No cyanosis, No calf tenderness  MUSCULOSKELETAL: Motor Strength 4/5 B/L upper and lower extremities; decreased bilateral lower extremity ROM due to pain; unable to SLR bilaterally; + thoracic and lumbar paraspinal tenderness  SKIN: Warm, dry, intact. No rashes, lesions, scars or wounds.     Risk factors associated with adverse outcomes related to opioid treatment  [ ] Concurrent benzodiazepine use  [ ] History/ Active substance use or alcohol use disorder  [ ] Psychiatric co-morbidity  [ ] Sleep apnea  [ ] COPD  [ ] BMI> 35  [ ] Liver dysfunction  [ ] Renal dysfunction  [ ] CHF  [ ] Smoker  [ ] Age > 60 years    [x]  [place]  Reviewed and Copied to Chart. See below.    Plan of care and goal oriented pain management treatment options were discussed with patient and /or primary care giver; all questions and concerns were addressed and care was aligned with patient's wishes.    Educated patient on goal oriented pain management treatment options      Low Dose Naltrexone Pregnancy And Lactation Text: Naltrexone is pregnancy category C.  There have been no adequate and well-controlled studies in pregnant women.  It should be used in pregnancy only if the potential benefit justifies the potential risk to the fetus.   Limited data indicates that naltrexone is minimally excreted into breastmilk.

## 2024-07-31 NOTE — CONSULT NOTE ADULT - PROBLEM SELECTOR RECOMMENDATION 9
Pt with acute back pain with radiculopathy which is somatic and neuropathic in nature likely due to sciatica. Patient has history of chronic back pain and takes Tramadol 50mg q 12hrs at home, verified on iSTOP, Gabapentin, Baclofen,  and Ibuprofen for pain. She follows Dr. Elizondo as outpatient for pain management. Xray Thoracic/Lumbar spine pending.   Opioid pain recommendations   - Discontinue Morphine 2/4mg IVP q 4hrs for mod/severe pain. IV opioids not indicated for back pain.  - Start Tramadol 50mg q 8hrs PRN for severe pain. Monitor for sedation/ respiratory depression. (patient takes Tramadol at home)  Non-opioid pain recommendations   - Start Toradol 30 mg IVP q 8 hours x 2 days. Monitor renal function  - Start Robaxin 750mg PO q 8hrs x 5 days. Monitor for sedation.   - Start Ofirmev 1g q 6hrs x 4 doses. Then start Acetaminophen 1 gram PO q 8 hours x 4 days. Monitor LFTs  - Increase home dose Gabapentin to 400mg po q 8 hours. Monitor renal function.   - Continue Lidoderm 4% patch daily. (12 hrs on/12 hrs off)  Bowel Regimen  - Continue Miralax 17G PO daily  - Continue Senna 2 tablets at bedtime for constipation  Mild pain (score 1-3)  - Non-pharmacological pain treatment recommendations  - Warm/ Cool packs PRN   - Repositioning extremity, elevation, imagery, relaxation, distraction.  - Physical therapy OOB if no contraindications   Recommendations discussed with primary team and RN

## 2024-07-31 NOTE — CONSULT NOTE ADULT - ASSESSMENT
Search Terms: Aide Britton, 1965Search Date: 07/31/2024 12:27:04 PM  The Drug Utilization Report below displays all of the controlled substance prescriptions, if any, that your patient has filled in the last twelve months. The information displayed on this report is compiled from pharmacy submissions to the Department, and accurately reflects the information as submitted by the pharmacies.    This report was requested by: Griselda Guevara | Reference #: [id number]    Practitioner Count: 2  Pharmacy Count: 1  Current Opioid Prescriptions: 1  Current Benzodiazepine Prescriptions: 1  Current Stimulant Prescriptions: 0      Patient Demographic Information (PDI)       PDI	First Name	Last Name	Birth Date	Gender	Street Address	[facility]	Zip Code  A	Aide Britton	1965	Female	[phone]	[address]    Prescription Information      PDI Filter:    PDI	My Rx	Current Rx	Drug Type	Rx Written	Rx Dispensed	Drug	Quantity	Days Supply	Prescriber Name	Prescriber MICHAEL #	Payment Method	Dispenser  A	N	Y		06/26/2024	07/26/2024	zolpidem tartrate 10 mg tablet	30	30	Torrey Griffith MD	[id number]	[facility]  A	N	Y	B	06/26/2024	07/15/2024	clonazepam 0.5 mg tablet	60	30	Torrey Griffith MD	[id number]	[facility]  A	N	Y		06/26/2024	07/15/2024	zolpidem tartrate 10 mg tablet	30	30	MoTorrey chavez MD	[id number]	[facility]  A	N	Y	O	07/02/2024	07/08/2024	tramadol hcl 50 mg tablet	60	30	FuzaylBenjamin waggoneriy	[id number]	[facility]  A	N	N	O	06/04/2024	06/19/2024	tramadol hcl 50 mg tablet	60	30	Fuzaylrosaline, Jesus	[id number]	[facility]  A	N	N		04/18/2024	05/16/2024	zolpidem tartrate 10 mg tablet	30	30	Torrey Griffith MD	[id number]	[facility]  A	N	N	O	04/02/2024	04/26/2024	tramadol hcl 50 mg tablet	60	30	Fuzaylrosaline, Jesus	[id number]	[facility]  A	N	N	B	04/18/2024	04/26/2024	clonazepam 0.5 mg tablet	60	30	MoTorrey chavez MD	[id number]	[facility]  A	N	N		04/18/2024	04/26/2024	zolpidem tartrate 10 mg tablet	30	30	MoussTorrey nova MD	[id number]	[facility]  A	N	N		02/22/2024	03/20/2024	zolpidem tartrate 10 mg tablet	30	30	Torrey Griffith MD	[id number]	[facility]  A	N	N	O	02/06/2024	02/22/2024	tramadol hcl 50 mg tablet	60	30	Jesus Elizondo	[id number]	[facility]  A	N	N	B	02/22/2024	02/22/2024	clonazepam 0.5 mg tablet	60	30	MoTorrey chavez MD	[id number]	[facility]  A	N	N		02/22/2024	02/22/2024	zolpidem tartrate 10 mg tablet	30	30	MoTorrey chavez MD	[id number]	[facility]  A	N	N		12/20/2023	01/17/2024	zolpidem tartrate 10 mg tablet	30	30	MoTorrey chavez MD	[id number]	[facility]  A	N	N	O	01/09/2024	01/17/2024	tramadol hcl 50 mg tablet	60	30	Jesus Elizondo	[id number]	[facility]  A	N	N		12/20/2023	12/21/2023	zolpidem tartrate 10 mg tablet	30	30	Torrey Griffith MD	[id number]	[facility]  A	N	N	B	12/20/2023	12/21/2023	clonazepam 0.5 mg tablet	60	30	Torrey Griffith MD	[id number]	[facility]  A	N	N	O	12/05/2023	12/15/2023	tramadol hcl 50 mg tablet	60	30	AlfredozaylJesus waggoner	[id number]	[facility]  A	N	N		10/12/2023	11/17/2023	zolpidem tartrate 10 mg tablet	30	30	MoussaviTorrey villar MD	[id number]	[facility]  A	N	N	O	10/27/2023	11/17/2023	tramadol hcl 50 mg tablet	60	30	Fuzaylov, Jesus	[id number]	[facility]  A	N	N	O	10/26/2023	10/27/2023	tramadol hcl 50 mg tablet	60	30	Fuzaylov, Jesus	[id number]	[facility]  A	N	N	B	10/12/2023	10/13/2023	clonazepam 0.5 mg tablet	60	30	MoussavianTorrey MD	[id number]	[facility]  A	N	N		10/12/2023	10/13/2023	zolpidem tartrate 10 mg tablet	30	30	MoussavianTorrey MD	[id number]	[facility]  A	N	N		08/09/2023	09/21/2023	zolpidem tartrate 10 mg tablet	30	30	MoussavianTorrey MD	[id number]	[facility]  A	N	N	O	08/23/2023	09/08/2023	tramadol hcl 50 mg tablet	60	30	Fuzaylov, Jesus	[id number]	[facility]  A	N	N		08/09/2023	08/22/2023	zolpidem tartrate 10 mg tablet	30	30	MoussavianTorrey MD	[id number]	[facility]  A	N	N		06/14/2023	08/11/2023	zolpidem tartrate 10 mg tablet	30	30	MoussaviTorrey villar MD	[id number]	[facility]  A	N	N	O	07/26/2023	08/11/2023	tramadol hcl 50 mg tablet	60	30	CourtGavi	[id number]	[facility]  A	N	N	B	08/09/2023	08/11/2023	clonazepam 0.5 mg tablet	60	30	MoussavianTorrey MD	[id number]	[facility]    * - Details of Drug Type : O = Opioid, B = Benzodiazepine, S = Stimulant    * - Drugs marked with an asterisk are compound drugs. If the compound drug is made up of more than one controlled substance, then each controlled substance will be a separate row in the table.

## 2024-07-31 NOTE — H&P ADULT - PROBLEM SELECTOR PLAN 1
p/w intractable back pain  Neuro exam non-focal   Chronic back pain follows pain management   On duloxetin, gabapentin, zolpidem per Surescript/claimed but patient did not mention   s/p toradol, morphine, IV tylenol in ED   c/w home duloxetin, gabapentin  Will do 1000mg standing tylenol q8h +morphine 2mg for moderate 4mg for severe  f/u thoracic, lumbar spine xray  Pain management consulted  PT cyn

## 2024-07-31 NOTE — ED PROVIDER NOTE - PHYSICAL EXAMINATION
CONSTITUTIONAL: non-toxic, well appearing  SKIN: no rash, no petechiae.  EYES: pink conjunctiva, anicteric  NECK: Supple; no meningismus, no JVD  CARD: RRR, no murmurs, equal radial pulses bilaterally 2+  RESP: CTAB, no respiratory distress  ABD: Soft, non-tender, non-distended, no peritoneal signs  SPINE: no midline bony tenderness  EXT: Normal ROM x4. No edema.   NEURO: Alert, oriented. Neuro exam nonfocal, equal strength bilaterally. 5/5 strength BLE. Sensation grossly intact.   PSYCH: Cooperative, appropriate.

## 2024-07-31 NOTE — ED PROVIDER NOTE - NSFOLLOWUPINSTRUCTIONS_ED_ALL_ED_FT
Back Pain    Back pain is very common in adults. The cause of back pain is rarely dangerous and the pain often gets better over time. The cause of your back pain may not be known and may include strain of muscles or ligaments, degeneration of the spinal disks, or arthritis. Occasionally the pain may radiate down your leg(s). Over-the-counter medicines to reduce pain and inflammation are often the most helpful. Stretching and remaining active frequently helps the healing process.     Rest and stay well hydrated.    Follow up with spine specialist as discussed. There are spine specialists within the [facility] system you may call [phone] for your back pain, call and arrange your follow up appointment.     Take over the counter acetaminophen (Tylenol) 650-1000 mg every 4-6 hours as needed for pain. Do not take more than 3000 mg in a 24 hour period. Be aware many over the counter and prescription medications also contain acetaminophen (Tylenol).     Take prescription naproxen every 12 hours with food as needed for pain.    Take prescription robaxin up to 4 times a day as needed for muscle spasm. Do not drive, drink alcohol, or operate machinery while taking this medication.    Apply over the counter lidocaine patch to affected area as needed for pain; leave on for 12 hours, leave off for up to 12 hours.     SEEK IMMEDIATE MEDICAL CARE IF YOU HAVE ANY OF THE FOLLOWING SYMPTOMS: bowel or bladder control problems, unusual weakness or numbness in your arms or legs, nausea or vomiting, abdominal pain, fever, dizziness/lightheadedness.

## 2024-07-31 NOTE — H&P ADULT - HISTORY OF PRESENT ILLNESS
Patient is a 58F, from home ambulates with cane, PMHx of chronic back pain, GERD, pre-DM and frequent UTI, who presents with 5 day history of intractable back pain. Patient woke up on Friday morning with mid lower back pain that radiated down to the leg and frontal ingunial area. She took tramadol 50mg BID, ibuprofen 800mg qd, baclofen 10mg for her back pain but the pain did not resolve so she presented to the ED. Patient also reports burning on urination for the last 10 days. Patient reports the pain is so severe that she is not able to move at all or even lift her legs to ambulate. Patient denies urinary or stool incontinence, saddle numbness or leg numbness. She denies leg numbness or weakness and reports the difficulty ambulation is strictly due to pain. Patient endorses constipation but denies other systemic symptoms including headahce, fever, chills, nausea, vomiting, abdominal pain, arm weakness/numbness, chest pain, palpitation. Patient denies any trauma to the back, reports no out of ordinary exercise at physical therapy last Monday. Patient reports only the morphine helped in the ED.     In ED   T 97.5F  HR 66, /78   RR 18, O2 95% in RA

## 2024-07-31 NOTE — ED PROVIDER NOTE - OBJECTIVE STATEMENT
58-year-old female with past medical history lumbar herniated disks, hyperlipidemia presents with low back pain x 5 days.  Patient reports bilateral low back pain radiating to bilateral thighs over the past 5 days, worse with movement and position change.  Denies any recent injury or trauma.  Patient also reports burning urination over the past week.  Denies any fevers, chest pain, shortness of breath, vomiting, numbness, weakness, saddle anesthesia, bowel/bladder dysfunction, or rash.  Denies any aspirin anticoagulation use.  Patient reports taking tramadol, Flexeril, and ibuprofen with little improvement.  Denies any additional complaints.

## 2024-08-01 ENCOUNTER — TRANSCRIPTION ENCOUNTER (OUTPATIENT)
Age: 59
End: 2024-08-01

## 2024-08-01 DIAGNOSIS — Z75.8 OTHER PROBLEMS RELATED TO MEDICAL FACILITIES AND OTHER HEALTH CARE: ICD-10-CM

## 2024-08-01 LAB
ALBUMIN SERPL ELPH-MCNC: 3.7 G/DL — SIGNIFICANT CHANGE UP (ref 3.5–5)
ALP SERPL-CCNC: 57 U/L — SIGNIFICANT CHANGE UP (ref 40–120)
ALT FLD-CCNC: 62 U/L DA — HIGH (ref 10–60)
ANION GAP SERPL CALC-SCNC: 6 MMOL/L — SIGNIFICANT CHANGE UP (ref 5–17)
AST SERPL-CCNC: 29 U/L — SIGNIFICANT CHANGE UP (ref 10–40)
BILIRUB SERPL-MCNC: 0.5 MG/DL — SIGNIFICANT CHANGE UP (ref 0.2–1.2)
BUN SERPL-MCNC: 19 MG/DL — HIGH (ref 7–18)
CALCIUM SERPL-MCNC: 8.9 MG/DL — SIGNIFICANT CHANGE UP (ref 8.4–10.5)
CHLORIDE SERPL-SCNC: 105 MMOL/L — SIGNIFICANT CHANGE UP (ref 96–108)
CO2 SERPL-SCNC: 26 MMOL/L — SIGNIFICANT CHANGE UP (ref 22–31)
CREAT SERPL-MCNC: 0.58 MG/DL — SIGNIFICANT CHANGE UP (ref 0.5–1.3)
CULTURE RESULTS: SIGNIFICANT CHANGE UP
EGFR: 105 ML/MIN/1.73M2 — SIGNIFICANT CHANGE UP
GLUCOSE SERPL-MCNC: 105 MG/DL — HIGH (ref 70–99)
HCT VFR BLD CALC: 34.5 % — SIGNIFICANT CHANGE UP (ref 34.5–45)
HGB BLD-MCNC: 11.9 G/DL — SIGNIFICANT CHANGE UP (ref 11.5–15.5)
MCHC RBC-ENTMCNC: 30.7 PG — SIGNIFICANT CHANGE UP (ref 27–34)
MCHC RBC-ENTMCNC: 34.5 GM/DL — SIGNIFICANT CHANGE UP (ref 32–36)
MCV RBC AUTO: 89.1 FL — SIGNIFICANT CHANGE UP (ref 80–100)
NRBC # BLD: 0 /100 WBCS — SIGNIFICANT CHANGE UP (ref 0–0)
PLATELET # BLD AUTO: 211 K/UL — SIGNIFICANT CHANGE UP (ref 150–400)
POTASSIUM SERPL-MCNC: 4.4 MMOL/L — SIGNIFICANT CHANGE UP (ref 3.5–5.3)
POTASSIUM SERPL-SCNC: 4.4 MMOL/L — SIGNIFICANT CHANGE UP (ref 3.5–5.3)
PROT SERPL-MCNC: 7 G/DL — SIGNIFICANT CHANGE UP (ref 6–8.3)
RBC # BLD: 3.87 M/UL — SIGNIFICANT CHANGE UP (ref 3.8–5.2)
RBC # FLD: 11.9 % — SIGNIFICANT CHANGE UP (ref 10.3–14.5)
SODIUM SERPL-SCNC: 137 MMOL/L — SIGNIFICANT CHANGE UP (ref 135–145)
SPECIMEN SOURCE: SIGNIFICANT CHANGE UP
WBC # BLD: 7.17 K/UL — SIGNIFICANT CHANGE UP (ref 3.8–10.5)
WBC # FLD AUTO: 7.17 K/UL — SIGNIFICANT CHANGE UP (ref 3.8–10.5)

## 2024-08-01 PROCEDURE — 99232 SBSQ HOSP IP/OBS MODERATE 35: CPT

## 2024-08-01 RX ORDER — METHOCARBAMOL 500 MG
1000 TABLET ORAL EVERY 8 HOURS
Refills: 0 | Status: DISCONTINUED | OUTPATIENT
Start: 2024-08-01 | End: 2024-08-02

## 2024-08-01 RX ORDER — ACETAMINOPHEN 500 MG
1000 TABLET ORAL EVERY 6 HOURS
Refills: 0 | Status: DISCONTINUED | OUTPATIENT
Start: 2024-08-01 | End: 2024-08-02

## 2024-08-01 RX ADMIN — Medication 30 MILLIGRAM(S): at 15:15

## 2024-08-01 RX ADMIN — Medication 400 MILLIGRAM(S): at 12:24

## 2024-08-01 RX ADMIN — Medication 5 MILLIGRAM(S): at 21:57

## 2024-08-01 RX ADMIN — LIDOCAINE 5% 1 PATCH: 5 CREAM TOPICAL at 12:23

## 2024-08-01 RX ADMIN — LIDOCAINE 5% 1 PATCH: 5 CREAM TOPICAL at 01:32

## 2024-08-01 RX ADMIN — Medication 1000 MILLIGRAM(S): at 13:06

## 2024-08-01 RX ADMIN — Medication 30 MILLIGRAM(S): at 21:56

## 2024-08-01 RX ADMIN — Medication 400 MILLIGRAM(S): at 01:34

## 2024-08-01 RX ADMIN — SENNOSIDES 2 TABLET(S): 8.6 TABLET ORAL at 21:57

## 2024-08-01 RX ADMIN — Medication 17 GRAM(S): at 12:23

## 2024-08-01 RX ADMIN — Medication 30 MILLIGRAM(S): at 05:51

## 2024-08-01 RX ADMIN — Medication 30 MILLIGRAM(S): at 22:25

## 2024-08-01 RX ADMIN — Medication 60 MILLIGRAM(S): at 12:24

## 2024-08-01 RX ADMIN — Medication 1000 MILLIGRAM(S): at 02:00

## 2024-08-01 RX ADMIN — Medication 1000 MILLIGRAM(S): at 21:57

## 2024-08-01 RX ADMIN — Medication 750 MILLIGRAM(S): at 05:51

## 2024-08-01 RX ADMIN — PANTOPRAZOLE SODIUM 40 MILLIGRAM(S): 20 TABLET, DELAYED RELEASE ORAL at 05:52

## 2024-08-01 RX ADMIN — Medication 1000 MILLIGRAM(S): at 14:44

## 2024-08-01 RX ADMIN — GABAPENTIN 400 MILLIGRAM(S): 400 CAPSULE ORAL at 05:51

## 2024-08-01 RX ADMIN — GABAPENTIN 400 MILLIGRAM(S): 400 CAPSULE ORAL at 21:57

## 2024-08-01 RX ADMIN — Medication 30 MILLIGRAM(S): at 06:20

## 2024-08-01 RX ADMIN — LIDOCAINE 5% 1 PATCH: 5 CREAM TOPICAL at 19:50

## 2024-08-01 NOTE — PROGRESS NOTE ADULT - PROBLEM SELECTOR PLAN 1
p/w intractable back pain  Neuro exam non-focal   Chronic back pain follows pain management   On duloxetin, gabapentin, zolpidem per Surescript/claimed but patient did not mention   s/p toradol, morphine, IV tylenol in ED   c/w home duloxetin, gabapentin  Will do 1000mg standing tylenol q8h +morphine 2mg for moderate 4mg for severe  f/u thoracic, lumbar spine xray  Pain management consulted  PT cyn p/w intractable back pain  Neuro exam non-focal   Chronic back pain follows pain management   On duloxetin, gabapentin, zolpidem per Surescript/claimed but patient did not mention   s/p toradol, morphine, IV tylenol in ED   c/w home duloxetin, gabapentin  Will do 1000mg standing tylenol q8h +morphine 2mg for moderate 4mg for severe  f/u thoracic, lumbar spine xray  Pain management consulted  PT Home PT

## 2024-08-01 NOTE — DISCHARGE NOTE PROVIDER - NSDCFUADDAPPT_GEN_ALL_CORE_FT
APPTS ARE READY TO BE MADE: [X] YES    Best Family or Patient Contact (if needed):    Additional Information about above appointments (if needed):    1: Rheumatology at 9525 #[phone]      Other comments or requests:    APPTS ARE READY TO BE MADE: [X] YES    Best Family or Patient Contact (if needed):    Additional Information about above appointments (if needed):    1: Rheumatology at 9574 #[phone]      Other comments or requests:     Patient has not secured an appointment yet with Internal Medicine, however during the initial conversation the patient declined scheduling assistance.    Provided patient with provider referral information for Rheumatology, however patient prefers to schedule the appointments on their own.

## 2024-08-01 NOTE — CONSULT NOTE ADULT - SUBJECTIVE AND OBJECTIVE BOX
GENERAL SURGERY CONSULT NOTE    Patient is a 58y old  Female who presents with a chief complaint of Intractable back pain (01 Aug 2024 13:57)      HPI:  Patient is a 58F, from home ambulates with cane, PMHx of chronic back pain, GERD, pre-DM and frequent UTI, who presents with 5 day history of intractable back pain. Patient woke up on Friday morning with mid lower back pain that radiated down to the leg and frontal ingunial area. She took tramadol 50mg BID, ibuprofen 800mg qd, baclofen 10mg for her back pain but the pain did not resolve so she presented to the ED. Patient also reports burning on urination for the last 10 days. Patient reports the pain is so severe that she is not able to move at all or even lift her legs to ambulate. Patient denies urinary or stool incontinence, saddle numbness or leg numbness. She denies leg numbness or weakness and reports the difficulty ambulation is strictly due to pain. Patient endorses constipation but denies other systemic symptoms including headahce, fever, chills, nausea, vomiting, abdominal pain, arm weakness/numbness, chest pain, palpitation. Patient denies any trauma to the back, reports no out of ordinary exercise at physical therapy last Monday. Patient reports only the morphine helped in the ED.     urology consulted on 57 y/o female a/w back pain. Consulted for frequent UTIs, patient admits to multiple UTIs per year, has been seen by urologist and ID in the past. Admits to dysuria symptoms. Chart reviewed, UA negative, urine culture wnl. Seen and examined, family at bedside, complaining of back pain. Admits to being able to void completely, dysuria the past week or so. Denies saddle anesthesia, hematuria, urinary retention, fever/chills    In ED   T 97.5F  HR 66, /78   RR 18, O2 95% in RA  (31 Jul 2024 11:26)      PAST MEDICAL & SURGICAL HISTORY:  High cholesterol      DVT (deep venous thrombosis)      Vertigo      Urinary tract infection      Pyelitis      Asthma      Chronic back pain      No significant past surgical history          Review of Systems:    I have reviewed 9 systems with the patient and the only positive findings were above    MEDICATIONS  (STANDING):  DULoxetine 60 milliGRAM(s) Oral daily  enoxaparin Injectable 40 milliGRAM(s) SubCutaneous every 24 hours  gabapentin 400 milliGRAM(s) Oral every 8 hours  ketorolac   Injectable 30 milliGRAM(s) IV Push every 8 hours  lidocaine   4% Patch 1 Patch Transdermal daily  methocarbamol 1000 milliGRAM(s) Oral every 8 hours  naloxone Injectable 0.4 milliGRAM(s) IV Push once  pantoprazole    Tablet 40 milliGRAM(s) Oral before breakfast  polyethylene glycol 3350 17 Gram(s) Oral daily  senna 2 Tablet(s) Oral at bedtime    MEDICATIONS  (PRN):  acetaminophen     Tablet .. 1000 milliGRAM(s) Oral every 6 hours PRN Moderate Pain (4 - 6)  bisacodyl 5 milliGRAM(s) Oral daily PRN Constipation  ondansetron Injectable 4 milliGRAM(s) IV Push every 4 hours PRN Nausea and/or Vomiting  traMADol 50 milliGRAM(s) Oral every 8 hours PRN Severe Pain (7 - 10)      Allergies    No Known Allergies    Intolerances    FAMILY HISTORY:      Vital Signs Last 24 Hrs  T(C): 36.7 (01 Aug 2024 11:51), Max: 36.7 (01 Aug 2024 05:22)  T(F): 98 (01 Aug 2024 11:51), Max: 98.1 (01 Aug 2024 05:22)  HR: 66 (01 Aug 2024 11:51) (66 - 79)  BP: 129/61 (01 Aug 2024 11:51) (119/67 - 141/81)  BP(mean): --  RR: 14 (01 Aug 2024 11:51) (14 - 18)  SpO2: 96% (01 Aug 2024 11:51) (92% - 96%)    Parameters below as of 01 Aug 2024 11:51  Patient On (Oxygen Delivery Method): room air        Physical Exam:    General:  Appears stated age, well-groomed, no distress  Eyes : EOMI   HENT:  WNL, no JVD  Respirations: Unlabored   Abdomen: Soft, nondistended, nontender, no suprapubic tenderness  Extremities: No edema b/l   Skin:  Warm and dry   Musculoskeletal:  No calf tenderness b/l   Neuro: Alert, oriented to time, place and person   Psych:  Normal affect       LABS:                        11.9   7.17  )-----------( 211      ( 01 Aug 2024 06:23 )             34.5     08-01    137  |  105  |  19<H>  ----------------------------<  105<H>  4.4   |  26  |  0.58    Ca    8.9      01 Aug 2024 06:23    TPro  7.0  /  Alb  3.7  /  TBili  0.5  /  DBili  x   /  AST  29  /  ALT  62<H>  /  AlkPhos  57  08-01      Urinalysis Basic - ( 01 Aug 2024 06:23 )    Color: x / Appearance: x / SG: x / pH: x  Gluc: 105 mg/dL / Ketone: x  / Bili: x / Urobili: x   Blood: x / Protein: x / Nitrite: x   Leuk Esterase: x / RBC: x / WBC x   Sq Epi: x / Non Sq Epi: x / Bacteria: x

## 2024-08-01 NOTE — PHYSICAL THERAPY INITIAL EVALUATION ADULT - SOCIAL CONCERNS
Problem: NORMAL   Goal: Experiences normal transition  Description: INTERVENTIONS:  - Assess and monitor vital signs and lab values.   - Encourage skin-to-skin with caregiver for thermoregulation  - Assess signs, symptoms and risk factors for hypog None

## 2024-08-01 NOTE — PHARMACOTHERAPY INTERVENTION NOTE - INTERVENTION CATEGORIES
spoke to pt about scheduling follow up and labs to be done a week prior. pt is out of town until next week and said she would call us back on monday to schedule. pt was made aware that the appt needed to be done in order to get future refills   
Therapy

## 2024-08-01 NOTE — PROGRESS NOTE ADULT - ASSESSMENT
Search Terms: Aide Britton, 1965Search Date: 07/31/2024 12:27:04 PM  The Drug Utilization Report below displays all of the controlled substance prescriptions, if any, that your patient has filled in the last twelve months. The information displayed on this report is compiled from pharmacy submissions to the Department, and accurately reflects the information as submitted by the pharmacies.    This report was requested by: Griselda Guevara | Reference #: [id number]    Practitioner Count: 2  Pharmacy Count: 1  Current Opioid Prescriptions: 1  Current Benzodiazepine Prescriptions: 1  Current Stimulant Prescriptions: 0      Patient Demographic Information (PDI)       PDI	First Name	Last Name	Birth Date	Gender	Street Address	[facility]	Zip Code  A	Aide Britton	1965	Female	[phone] [place]	[address]    Prescription Information      PDI Filter:    PDI	My Rx	Current Rx	Drug Type	Rx Written	Rx Dispensed	Drug	Quantity	Days Supply	Prescriber Name	Prescriber MICHAEL #	Payment Method	Dispenser  A	N	Y		06/26/2024	07/26/2024	zolpidem tartrate 10 mg tablet	30	30	Torrey Griffith MD	[id number]	[facility]  A	N	Y	B	06/26/2024	07/15/2024	clonazepam 0.5 mg tablet	60	30	Torrey Griffith MD	[id number]	[facility]  A	N	[place]		06/26/2024	07/15/2024	zolpidem tartrate 10 mg tablet	30	30	MoTorrey chavez MD	[id number]	[facility]  A	N	Y	O	07/02/2024	07/08/2024	tramadol hcl 50 mg tablet	60	30	FuzaylBenjamin waggoneriy	[id number]	[facility]  A	N	N	O	06/04/2024	06/19/2024	tramadol hcl 50 mg tablet	60	30	Fuzaylrosaline, Jesus	[id number]	[facility]  A	N	N		04/18/2024	05/16/2024	zolpidem tartrate 10 mg tablet	30	30	Torrey Griffith MD	[id number]	[facility]  A	N	N	O	04/02/2024	04/26/2024	tramadol hcl 50 mg tablet	60	30	Fuzaylrosaline, Jesus	[id number]	[facility]  A	N	N	B	04/18/2024	04/26/2024	clonazepam 0.5 mg tablet	60	30	MoTorrey chavez MD	[id number]	[facility]  A	N	N		04/18/2024	04/26/2024	zolpidem tartrate 10 mg tablet	30	30	MoussTorrey nova MD	[id number]	[facility]  A	N	N		02/22/2024	03/20/2024	zolpidem tartrate 10 mg tablet	30	30	Torrey Griffith MD	[id number]	[facility]  A	N	N	O	02/06/2024	02/22/2024	tramadol hcl 50 mg tablet	60	30	Jesus Elizondo	[id number]	[facility]  A	N	N	B	02/22/2024	02/22/2024	clonazepam 0.5 mg tablet	60	30	MoTorrey chavez MD	[id number]	[facility]  A	N	N		02/22/2024	02/22/2024	zolpidem tartrate 10 mg tablet	30	30	MoTorrey chavez MD	[id number]	[facility]  A	N	N		12/20/2023	01/17/2024	zolpidem tartrate 10 mg tablet	30	30	MoTorrey chavez MD	[id number]	[facility]  A	N	N	O	01/09/2024	01/17/2024	tramadol hcl 50 mg tablet	60	30	Jesus Elizondo	[id number]	[facility]  A	N	N		12/20/2023	12/21/2023	zolpidem tartrate 10 mg tablet	30	30	Torrey Griffith MD	[id number]	[facility]  A	N	N	B	12/20/2023	12/21/2023	clonazepam 0.5 mg tablet	60	30	Torrey Griffith MD	[id number]	[facility]  A	N	N	O	12/05/2023	12/15/2023	tramadol hcl 50 mg tablet	60	30	AlfredozaylJesus waggoner	[id number]	[facility]  A	N	N		10/12/2023	11/17/2023	zolpidem tartrate 10 mg tablet	30	30	MoussaviTorrey villar MD	[id number]	[facility]  A	N	N	O	10/27/2023	11/17/2023	tramadol hcl 50 mg tablet	60	30	Fuzaylov, Jesus	[id number]	[facility]  A	N	N	O	10/26/2023	10/27/2023	tramadol hcl 50 mg tablet	60	30	Fuzaylov, Jesus	[id number]	[facility]  A	N	N	B	10/12/2023	10/13/2023	clonazepam 0.5 mg tablet	60	30	MoussavianTorrey MD	[id number]	[facility]  A	N	N		10/12/2023	10/13/2023	zolpidem tartrate 10 mg tablet	30	30	MoussavianTorrey MD	[id number]	[facility]  A	N	N		08/09/2023	09/21/2023	zolpidem tartrate 10 mg tablet	30	30	MoussavianTorrey MD	[id number]	[facility]  A	N	N	O	08/23/2023	09/08/2023	tramadol hcl 50 mg tablet	60	30	Fuzaylov, Jesus	[id number]	[facility]  A	N	N		08/09/2023	08/22/2023	zolpidem tartrate 10 mg tablet	30	30	MoussavianTorrey MD	[id number]	[facility]  A	N	N		06/14/2023	08/11/2023	zolpidem tartrate 10 mg tablet	30	30	MoussaviTorrey villar MD	[id number]	[facility]  A	N	N	O	07/26/2023	08/11/2023	tramadol hcl 50 mg tablet	60	30	CourtGavi	[id number]	[facility]  A	N	N	B	08/09/2023	08/11/2023	clonazepam 0.5 mg tablet	60	30	MoussavianTorrey MD	[id number]	[facility]    * - Details of Drug Type : O = Opioid, B = Benzodiazepine, S = Stimulant    * - Drugs marked with an asterisk are compound drugs. If the compound drug is made up of more than one controlled substance, then each controlled substance will be a separate row in the table.

## 2024-08-01 NOTE — PROGRESS NOTE ADULT - ASSESSMENT
Patient is a 58F, from home ambulates with cane, PMHx of chronic back pain, GERD, pre-DM and frequent UTI, Presented to ED with 5 day history of intractable back pain. Neuro exam nonfocal, no evidence of cauda equina syndrome or cord compression.   Admitted for intractable back pain.  Patient is a 58F, from home ambulates with cane, PMHx of chronic back pain, GERD, pre-DM and frequent UTI, Presented to ED with 5 day history of intractable back pain. Neuro exam nonfocal, no evidence of cauda equina syndrome or cord compression.   Admitted for intractable back pain.   Pending Xray   PT-Home pt with rolling walker  Patient is a 58F, from home ambulates with cane, PMHx of chronic back pain, GERD, pre-DM and frequent UTI, Presented to ED with 5 day history of intractable back pain. Neuro exam nonfocal, no evidence of cauda equina syndrome or cord compression.   Admitted for intractable back pain.   Out pt MRI from [facility] shows mild heike   Pending Xray   PT-Home pt with rolling walker  Patient is a 58F, from home ambulates with cane, PMHx of chronic back pain, GERD, pre-DM and frequent UTI, Presented to ED with 5 day history of intractable back pain. Neuro exam nonfocal, no evidence of cauda equina syndrome or cord compression.   Admitted for intractable back pain.   Out pt MRI from [facility] shows degenerative changes of Lumbar spine, mild canal and b/l neural foraminal stenosis with disc abutting the traversing L5 nerve roots, L4-5 disc bulge.    Pending Xray   Neuro consulted for Sciatica   PT-Home pt with rolling walker  Patient is a 58F, from home ambulates with cane, PMHx of chronic back pain, GERD, pre-DM and frequent UTI, Presented to ED with 5 day history of intractable back pain. Neuro exam nonfocal, no evidence of cauda equina syndrome or cord compression.   Admitted for intractable back pain.   Out pt MRI from [facility] shows degenerative changes of Lumbar spine, mild canal and b/l neural foraminal stenosis with disc abutting the traversing L5 nerve roots, L4-5 disc bulge.    Neuro consulted for Sciatica   Urology consulted for Dysuria   PT-Home pt with rolling walker

## 2024-08-01 NOTE — DISCHARGE NOTE PROVIDER - NSDCCPCAREPLAN_GEN_ALL_CORE_FT
PRINCIPAL DISCHARGE DIAGNOSIS  Diagnosis: Intractable back pain  Assessment and Plan of Treatment: You presented with intractable back pain. Your XR showed -----  You were seen by pain management team, your pain is controlled during your hospital stay.  You were evaluated by physical therapist who recommended home physical therapy with rolling walker  Follow up with Dr Elizondo outpatient      SECONDARY DISCHARGE DIAGNOSES  Diagnosis: GERD (gastroesophageal reflux disease)  Assessment and Plan of Treatment: Continue home medication. Follow up with primary care provider  Avoid foods and drinks that make your symptoms worse, such as: Caffeine or alcoholic drinks. Chocolate. spicy foods. Citrus fruits tomato-based foods, Fried and fatty foods.  Avoid lying down for the 3 hours after eating. Eat small, frequent meals      Diagnosis: Frequent UTI  Assessment and Plan of Treatment: You presented with painful urination. Your urinalysis is not concerning for infection  Drink enough water and fluids to keep your urine clear or pale yellow.  Avoid caffeine, tea, and carbonated beverages. They tend to irritate your bladder.  Empty your bladder often. Avoid holding urine for long periods of time.  SEEK MEDICAL CARE IF:  You have back pain.  You develop a fever.  Your symptoms do not begin to resolve within 3 days.  SEEK IMMEDIATE MEDICAL CARE IF:  You have severe back pain or lower abdominal pain.  You develop chills.  You have nausea or vomiting.  You have continued burning or discomfort with urination.     PRINCIPAL DISCHARGE DIAGNOSIS  Diagnosis: Intractable back pain  Assessment and Plan of Treatment: You presented with intractable back pain. Your out pt MRI from [facility] shows degenerative changes of Lumbar spine, mild canal and b/l neural foraminal stenosis with disc abutting the traversing L5 nerve roots, L4-5 disc bulge  You were seen by pain management team, your pain is controlled during your hospital stay.  You were evaluated by physical therapist who recommended home physical therapy with rolling walker  Follow up with Dr Elizondo outpatient      SECONDARY DISCHARGE DIAGNOSES  Diagnosis: GERD (gastroesophageal reflux disease)  Assessment and Plan of Treatment: Continue home medication. Follow up with primary care provider  Avoid foods and drinks that make your symptoms worse, such as: Caffeine or alcoholic drinks. Chocolate. spicy foods. Citrus fruits tomato-based foods, Fried and fatty foods.  Avoid lying down for the 3 hours after eating. Eat small, frequent meals      Diagnosis: Frequent UTI  Assessment and Plan of Treatment: You presented with painful urination. Your urinalysis is not concerning for infection. You were followed by Urology inpatient and had a catscan of your abdomine and pelvis which had no significant findings. Follow up out  pt with your urologist within 1 week of discharge for further medical managment.   Drink enough water and fluids to keep your urine clear or pale yellow.  Avoid caffeine, tea, and carbonated beverages. They tend to irritate your bladder.  Empty your bladder often. Avoid holding urine for long periods of time.  SEEK MEDICAL CARE IF:  You have back pain.  You develop a fever.  Your symptoms do not begin to resolve within 3 days.  SEEK IMMEDIATE MEDICAL CARE IF:  You have severe back pain or lower abdominal pain.  You develop chills.  You have nausea or vomiting.  You have continued burning or discomfort with urination.     PRINCIPAL DISCHARGE DIAGNOSIS  Diagnosis: Intractable back pain  Assessment and Plan of Treatment: You presented with intractable back pain. Your out pt MRI from [facility] shows degenerative changes of Lumbar spine, mild canal and b/l neural foraminal stenosis with disc abutting the traversing L5 nerve roots, L4-5 disc bulge  You were seen by pain management team, your pain is controlled during your hospital stay.  You were evaluated by physical therapist who recommended home physical therapy with rolling walker  You were followed by a nurologist inpatient whom reccomends you follow up with rhomatology outpatient. An appoinment will be made for you. You will recieve a phone call with appoinment details in the few coming days.   Follow up with Dr Elizondo outpatient      SECONDARY DISCHARGE DIAGNOSES  Diagnosis: GERD (gastroesophageal reflux disease)  Assessment and Plan of Treatment: Continue home medication. Follow up with primary care provider  Avoid foods and drinks that make your symptoms worse, such as: Caffeine or alcoholic drinks. Chocolate. spicy foods. Citrus fruits tomato-based foods, Fried and fatty foods.  Avoid lying down for the 3 hours after eating. Eat small, frequent meals      Diagnosis: Frequent UTI  Assessment and Plan of Treatment: You presented with painful urination. Your urinalysis is not concerning for infection. You were followed by Urology inpatient and had a catscan of your abdomine and pelvis which had no significant findings. Follow up out  pt with your urologist within 1 week of discharge for further medical managment.   Drink enough water and fluids to keep your urine clear or pale yellow.  Avoid caffeine, tea, and carbonated beverages. They tend to irritate your bladder.  Empty your bladder often. Avoid holding urine for long periods of time.  SEEK MEDICAL CARE IF:  You have back pain.  You develop a fever.  Your symptoms do not begin to resolve within 3 days.  SEEK IMMEDIATE MEDICAL CARE IF:  You have severe back pain or lower abdominal pain.  You develop chills.  You have nausea or vomiting.  You have continued burning or discomfort with urination.

## 2024-08-01 NOTE — CONSULT NOTE ADULT - ASSESSMENT
Jhoan called back.  I let her know the soonest surgical date was 9/20/2017.  She stated she was going to [place] 2 weeks later.  Advised her that with traveling she may want to have surgery when she gets back from vacation.  She stated she wants to have it before her vacation.  Advised her I would let Oralia know to schedule her for 9/20/2017.  Patient verbalized understanding.  Writer let her know should there come any surgical opening we would contact her, to see about moving up her surgical date.   Patient apperceitive of that, but understands surgical date probably will not get moved up.  Support provided.    59 y/o female a/w back pain, frequent UTIs in the past  59 y/o female a/w back pain, frequent UTIs in the past   -f/u neuro   -recommend CT abd/pelv  -may follow up in the office with Dr. Esteban   -Discussed with Dr. Esteban

## 2024-08-01 NOTE — DISCHARGE NOTE PROVIDER - CARE PROVIDER_API CALL
[facility]  Internal Medicine  [address], [address]  [place] [address]  Phone: [phone]  Fax: [phone]  Follow Up Time: 2 weeks

## 2024-08-01 NOTE — CHART NOTE - NSCHARTNOTEFT_GEN_A_CORE
Pt with chronic intractable back pain related to Sciatica. Was evaluated by PT and needs Rolling walker to ambulate safely.

## 2024-08-01 NOTE — DISCHARGE NOTE PROVIDER - HOSPITAL COURSE
Patient is a 58F, from home ambulates with cane, PMHx of chronic back pain, GERD, pre-DM and frequent UTI, Presented to ED with 5 day history of intractable back pain. Neuro exam nonfocal, no evidence of cauda equina syndrome or cord compression.   Admitted for intractable back pain. 58F, from home ambulates with cane, PMHx of chronic back pain, GERD, pre-DM and frequent UTI, Presented to ED with 5 day history of intractable back pain. Neuro exam nonfocal, no evidence of cauda equina syndrome or cord compression.   Admitted for intractable back pain. Pain mgt followed, pt follows with Dr. Elizondo  XR thoracic/lumbar/spine showed     PT Recs home PT with rolling walker    Pt is medically optimized for discharge, discussed with the attending  This is just a brief hospital course, please refer to the progress notes for detailed information   Patient is a 58F, from home ambulates with cane, PMHx of chronic back pain, GERD, pre-DM and frequent UTI, Presented to ED with 5 day history of intractable back pain. Neuro exam nonfocal, no evidence of cauda equina syndrome or cord compression.   Admitted for intractable back pain.   Out pt MRI from [facility] shows degenerative changes of Lumbar spine, mild canal and b/l neural foraminal stenosis with disc abutting the traversing L5 nerve roots, L4-5 disc bulge.    Neuro consulted for Sciatica   Urology consulted for Urinary hesitancy and pelvic pain. F/U out patient with urologist.   CT A/P  No evidence for a ureteral calculus. No hydronephrosis or pyelonephritis.  PT-Home pt with rolling walker     PT Recs home PT with rolling walker    Pt is medically optimized for discharge, discussed with the attending  This is just a brief hospital course, please refer to the progress notes for detailed information   Patient is a 58F, from home ambulates with cane, PMHx of chronic back pain, GERD, pre-DM and frequent UTI, Presented to ED with 5 day history of intractable back pain. Neuro exam nonfocal, no evidence of cauda equina syndrome or cord compression.   Admitted for intractable back pain.   Out pt MRI from [facility] shows degenerative changes of Lumbar spine, mild canal and b/l neural foraminal stenosis with disc abutting the traversing L5 nerve roots, L4-5 disc bulge.    Neuro consulted for Sciatica whom recommends out patient follow up with a Rheumatologist.   Urology consulted for Urinary hesitancy and pelvic pain. F/U out patient with urologist.   CT A/P  No evidence for a ureteral calculus. No hydronephrosis or pyelonephritis.    PT Recs home PT with rolling walker    Please note that this a brief summary of hospital course please refer to daily progress notes and consult notes for full course and events. Patient seen and examined at bedside, discussed with medical attending. Patient medically optimized for discharge to home.

## 2024-08-01 NOTE — PHYSICAL THERAPY INITIAL EVALUATION ADULT - PATIENT/FAMILY/SIGNIFICANT OTHER GOALS STATEMENT, PT EVAL
Patient stated that her pain is better than she came in with; however, still has pain(7/10) on ambulation

## 2024-08-01 NOTE — PROGRESS NOTE ADULT - PROBLEM SELECTOR PLAN 1
Pt with acute back pain with radiculopathy which is somatic and neuropathic in nature likely due to sciatica. Patient has history of chronic back pain and takes Tramadol 50mg q 12hrs at home, verified on iSTOP, Gabapentin, Baclofen,  and Ibuprofen for pain. She follows Dr. Elizondo as outpatient for pain management. Xray Thoracic/Lumbar spine pending.   Opioid pain recommendations   - Continue Tramadol 50mg q 8hrs PRN for severe pain. Monitor for sedation/ respiratory depression. (patient takes Tramadol at home)  Non-opioid pain recommendations   - Continue Toradol 30 mg IVP q 8 hours x 2 days. Monitor renal function  - Increase Robaxin to 1000 mg PO q 8hrs x 5 days. Monitor for sedation.   - Continue Ofirmev 1g q 6hrs x 4 doses. Then start Acetaminophen 1 gram PO q 8 hours x 4 days. Monitor LFTs  - Continue Gabapentin to 400mg po q 8 hours. Monitor renal function.   - Continue Lidoderm 4% patch daily. (12 hrs on/12 hrs off)  Bowel Regimen  - Continue Miralax 17G PO daily  - Continue Senna 2 tablets at bedtime for constipation  Mild pain (score 1-3)  - Non-pharmacological pain treatment recommendations  - Warm/ Cool packs PRN   - Repositioning extremity, elevation, imagery, relaxation, distraction.  - Physical therapy OOB if no contraindications   Recommendations discussed with primary team and RN.

## 2024-08-01 NOTE — DISCHARGE NOTE PROVIDER - NSDCMRMEDTOKEN_GEN_ALL_CORE_FT
baclofen 10 mg oral tablet: 1 tab(s) orally once a day as needed for  mild pain  Dexilant 60 mg oral delayed release capsule: 1 cap(s) orally once a day  doxepin 50 mg oral capsule: 1 cap(s) orally once a day  DULoxetine 60 mg oral delayed release capsule: 1 cap(s) orally once a day  gabapentin 300 mg oral tablet: 1 tab(s) orally 3 times a day  Incruse Ellipta 62.5 mcg/inh inhalation powder: 1 puff(s) inhaled once a day  methenamine hippurate 1 g oral tablet: 1 tab(s) orally once a day  traMADol 50 mg oral tablet: 1 tab(s) orally 2 times a day as needed for  severe pain  zolpidem 10 mg oral tablet: 1 tab(s) orally once a day   baclofen 10 mg oral tablet: 1 tab(s) orally once a day as needed for  mild pain  Dexilant 60 mg oral delayed release capsule: 1 cap(s) orally once a day  doxepin 50 mg oral capsule: 1 cap(s) orally once a day  DULoxetine 60 mg oral delayed release capsule: 1 cap(s) orally once a day  gabapentin 300 mg oral tablet: 1 tab(s) orally 3 times a day  Incruse Ellipta 62.5 mcg/inh inhalation powder: 1 puff(s) inhaled once a day  methenamine hippurate 1 g oral tablet: 1 tab(s) orally once a day  Rolling walker: 1 Rolling walker while ambulating  traMADol 50 mg oral tablet: 1 tab(s) orally 2 times a day as needed for  severe pain  zolpidem 10 mg oral tablet: 1 tab(s) orally once a day   baclofen 10 mg oral tablet: 1 tab(s) orally once a day as needed for  mild pain  Dexilant 60 mg oral delayed release capsule: 1 cap(s) orally once a day  doxepin 50 mg oral capsule: 1 cap(s) orally once a day  DULoxetine 60 mg oral delayed release capsule: 1 cap(s) orally once a day  gabapentin 300 mg oral tablet: 1 tab(s) orally 3 times a day  Incruse Ellipta 62.5 mcg/inh inhalation powder: 1 puff(s) inhaled once a day  ketorolac 10 mg oral tablet: 1 tab(s) orally every 8 hours as needed for  muscle spasm  methenamine hippurate 1 g oral tablet: 1 tab(s) orally once a day  methocarbamol 500 mg oral tablet: 1 tab(s) orally every 8 hours as needed for  muscle spasm  Rolling walker: 1 Rolling walker while ambulating  traMADol 50 mg oral tablet: 1 tab(s) orally 2 times a day as needed for  severe pain  zolpidem 10 mg oral tablet: 1 tab(s) orally once a day

## 2024-08-01 NOTE — PHYSICAL THERAPY INITIAL EVALUATION ADULT - DIAGNOSIS, PT EVAL
Patient presented with pain on lower back radiating to B LE'S upto mid thigh and groin(worse with ambulation), sensation normal, slight impairments in balance during ambulation; requires RW for safe ambulation at home

## 2024-08-02 ENCOUNTER — TRANSCRIPTION ENCOUNTER (OUTPATIENT)
Age: 59
End: 2024-08-02

## 2024-08-02 VITALS
TEMPERATURE: 99 F | DIASTOLIC BLOOD PRESSURE: 65 MMHG | OXYGEN SATURATION: 97 % | HEART RATE: 69 BPM | SYSTOLIC BLOOD PRESSURE: 132 MMHG | RESPIRATION RATE: 15 BRPM

## 2024-08-02 LAB
ALBUMIN SERPL ELPH-MCNC: 3.7 G/DL — SIGNIFICANT CHANGE UP (ref 3.5–5)
ALP SERPL-CCNC: 60 U/L — SIGNIFICANT CHANGE UP (ref 40–120)
ALT FLD-CCNC: 60 U/L DA — SIGNIFICANT CHANGE UP (ref 10–60)
ANION GAP SERPL CALC-SCNC: 7 MMOL/L — SIGNIFICANT CHANGE UP (ref 5–17)
AST SERPL-CCNC: 26 U/L — SIGNIFICANT CHANGE UP (ref 10–40)
BILIRUB SERPL-MCNC: 0.3 MG/DL — SIGNIFICANT CHANGE UP (ref 0.2–1.2)
BUN SERPL-MCNC: 17 MG/DL — SIGNIFICANT CHANGE UP (ref 7–18)
CALCIUM SERPL-MCNC: 8.9 MG/DL — SIGNIFICANT CHANGE UP (ref 8.4–10.5)
CHLORIDE SERPL-SCNC: 104 MMOL/L — SIGNIFICANT CHANGE UP (ref 96–108)
CO2 SERPL-SCNC: 27 MMOL/L — SIGNIFICANT CHANGE UP (ref 22–31)
CREAT SERPL-MCNC: 0.71 MG/DL — SIGNIFICANT CHANGE UP (ref 0.5–1.3)
EGFR: 99 ML/MIN/1.73M2 — SIGNIFICANT CHANGE UP
GLUCOSE SERPL-MCNC: 98 MG/DL — SIGNIFICANT CHANGE UP (ref 70–99)
HCT VFR BLD CALC: 35.7 % — SIGNIFICANT CHANGE UP (ref 34.5–45)
HGB BLD-MCNC: 12 G/DL — SIGNIFICANT CHANGE UP (ref 11.5–15.5)
MCHC RBC-ENTMCNC: 30 PG — SIGNIFICANT CHANGE UP (ref 27–34)
MCHC RBC-ENTMCNC: 33.6 GM/DL — SIGNIFICANT CHANGE UP (ref 32–36)
MCV RBC AUTO: 89.3 FL — SIGNIFICANT CHANGE UP (ref 80–100)
NRBC # BLD: 0 /100 WBCS — SIGNIFICANT CHANGE UP (ref 0–0)
PLATELET # BLD AUTO: 230 K/UL — SIGNIFICANT CHANGE UP (ref 150–400)
POTASSIUM SERPL-MCNC: 3.9 MMOL/L — SIGNIFICANT CHANGE UP (ref 3.5–5.3)
POTASSIUM SERPL-SCNC: 3.9 MMOL/L — SIGNIFICANT CHANGE UP (ref 3.5–5.3)
PROT SERPL-MCNC: 7.2 G/DL — SIGNIFICANT CHANGE UP (ref 6–8.3)
RBC # BLD: 4 M/UL — SIGNIFICANT CHANGE UP (ref 3.8–5.2)
RBC # FLD: 11.9 % — SIGNIFICANT CHANGE UP (ref 10.3–14.5)
SODIUM SERPL-SCNC: 138 MMOL/L — SIGNIFICANT CHANGE UP (ref 135–145)
WBC # BLD: 6.23 K/UL — SIGNIFICANT CHANGE UP (ref 3.8–10.5)
WBC # FLD AUTO: 6.23 K/UL — SIGNIFICANT CHANGE UP (ref 3.8–10.5)

## 2024-08-02 PROCEDURE — 96374 THER/PROPH/DIAG INJ IV PUSH: CPT

## 2024-08-02 PROCEDURE — 87086 URINE CULTURE/COLONY COUNT: CPT

## 2024-08-02 PROCEDURE — 85025 COMPLETE CBC W/AUTO DIFF WBC: CPT

## 2024-08-02 PROCEDURE — 74177 CT ABD & PELVIS W/CONTRAST: CPT | Mod: MC

## 2024-08-02 PROCEDURE — 99221 1ST HOSP IP/OBS SF/LOW 40: CPT

## 2024-08-02 PROCEDURE — 97530 THERAPEUTIC ACTIVITIES: CPT

## 2024-08-02 PROCEDURE — 96376 TX/PRO/DX INJ SAME DRUG ADON: CPT

## 2024-08-02 PROCEDURE — 74177 CT ABD & PELVIS W/CONTRAST: CPT | Mod: 26

## 2024-08-02 PROCEDURE — 96375 TX/PRO/DX INJ NEW DRUG ADDON: CPT

## 2024-08-02 PROCEDURE — 80053 COMPREHEN METABOLIC PANEL: CPT

## 2024-08-02 PROCEDURE — 85027 COMPLETE CBC AUTOMATED: CPT

## 2024-08-02 PROCEDURE — 99285 EMERGENCY DEPT VISIT HI MDM: CPT | Mod: 25

## 2024-08-02 PROCEDURE — 36415 COLL VENOUS BLD VENIPUNCTURE: CPT

## 2024-08-02 PROCEDURE — 99232 SBSQ HOSP IP/OBS MODERATE 35: CPT | Mod: FS

## 2024-08-02 PROCEDURE — 97116 GAIT TRAINING THERAPY: CPT

## 2024-08-02 PROCEDURE — 97110 THERAPEUTIC EXERCISES: CPT

## 2024-08-02 PROCEDURE — 81001 URINALYSIS AUTO W/SCOPE: CPT

## 2024-08-02 RX ORDER — METHOCARBAMOL 500 MG
1 TABLET ORAL
Qty: 9 | Refills: 0
Start: 2024-08-02 | End: 2024-08-04

## 2024-08-02 RX ORDER — KETOROLAC TROMETHAMINE 10 MG
1 TABLET ORAL
Qty: 9 | Refills: 0
Start: 2024-08-02 | End: 2024-08-04

## 2024-08-02 RX ORDER — NAPROXEN 250 MG
375 TABLET ORAL
Refills: 0 | Status: DISCONTINUED | OUTPATIENT
Start: 2024-08-02 | End: 2024-08-02

## 2024-08-02 RX ORDER — ASPIRIN 325 MG
10 TABLET ORAL ONCE
Refills: 0 | Status: COMPLETED | OUTPATIENT
Start: 2024-08-02 | End: 2024-08-02

## 2024-08-02 RX ADMIN — GABAPENTIN 400 MILLIGRAM(S): 400 CAPSULE ORAL at 13:18

## 2024-08-02 RX ADMIN — Medication 1000 MILLIGRAM(S): at 09:16

## 2024-08-02 RX ADMIN — Medication 30 MILLIGRAM(S): at 06:10

## 2024-08-02 RX ADMIN — GABAPENTIN 400 MILLIGRAM(S): 400 CAPSULE ORAL at 05:42

## 2024-08-02 RX ADMIN — Medication 1000 MILLIGRAM(S): at 05:42

## 2024-08-02 RX ADMIN — Medication 1000 MILLIGRAM(S): at 13:18

## 2024-08-02 RX ADMIN — Medication 30 MILLIGRAM(S): at 05:43

## 2024-08-02 RX ADMIN — LIDOCAINE 5% 1 PATCH: 5 CREAM TOPICAL at 00:13

## 2024-08-02 RX ADMIN — Medication 1000 MILLIGRAM(S): at 09:46

## 2024-08-02 RX ADMIN — PANTOPRAZOLE SODIUM 40 MILLIGRAM(S): 20 TABLET, DELAYED RELEASE ORAL at 05:44

## 2024-08-02 RX ADMIN — Medication 10 MILLIGRAM(S): at 10:02

## 2024-08-02 NOTE — CONSULT NOTE ADULT - ASSESSMENT
Polyarthralgia.    Osteoarthritis.    Lumbago. Chronic (7 years).    Obesity.    R/O seropositive arthropathy.    No demonstrable neurologic deficits (motor examination of LEs limited by guarding due to pain).      RECOMMENDATIONS    Out-Pt rheumatology follow-up.  Repeat evaluation to R/O seropositive arthropathy (was evaluated three years ago).     Out-Pt PT.    Weight loss.          Bhavani Alcala M.D.   - Department of Neurology  [place] [facility]             Polyarthralgia.    Osteoarthritis.  Generalized, longstanding.    Lumbago. Chronic (7 years).    Obesity.    Multilevel spondylosis.      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      R/O seropositive arthropathy.    No demonstrable neurologic deficits (motor examination of LEs limited by guarding due to pain).      RECOMMENDATIONS    Out-Pt rheumatology follow-up.  Repeat evaluation to R/O seropositive arthropathy (was evaluated three years ago).     Out-Pt PT.    Weight loss.                                                           IMPORTANT  -  PLEASE NOTE:                              I am a neurohospitalist. I do not see patients outside of the hospital.        Patients requiring neurological follow-up after discharge may contact one of the following offices.     [facility] [address] [place] [address]  [phone]    [facility]  [address].  [place], [place]  [phone]      Bhavani Alcala M.D.   - Department of Neurology  [employer] at [facility]

## 2024-08-02 NOTE — DISCHARGE NOTE NURSING/CASE MANAGEMENT/SOCIAL WORK - NSTRANSFEREYEGLASSESPAIRS_GEN_A_NUR
1 pair The patient appears stated age, fair hygiene, disheveled, hair not combed.  Patient continues to talk to herself constantly. She was irritable, uncooperative with interview this morning and would not respond to questions this morning. She maintains very minimal eye contact. No psychomotor agitation or retardation.  Stereotypical repetitive movement of the right hand. Steady gait. +fine tremors b/l hands; The patient’s speech was fluent, normal in tone, rate and volume. Unable to assess patient's mood. Affect is constricted today. Unable to assess for delusions and hallucinations; however, she is observed responding to internal stimuli, talking to self. She denies SI or HI . Insight is limited. Judgment today is fair. Impulse control has been fair since arriving on the unit. The patient appears stated age, disheveled, with uncombed hair and a blanket over her head. She was irritable and uncooperative with interview this morning. She maintains very minimal eye contact. No psychomotor agitation or retardation.  Stereotypical repetitive movement of the right hand. Steady gait. +fine tremors b/l hands; The patient’s speech was fluent, normal in tone, rate and volume. Unable to assess patient's mood. Affect is constricted today, unable to be fully evaluated. Unable to assess extent of delusions and hallucinations; however, she is observed responding to internal stimuli, talking to self. She denies SI or HI . Insight is limited. Judgment today is fair. Impulse control has been fair since arriving on the unit.

## 2024-08-02 NOTE — PROGRESS NOTE ADULT - ASSESSMENT
Search Terms: Aide Britton, 1965Search Date: 07/31/2024 12:27:04 PM  The Drug Utilization Report below displays all of the controlled substance prescriptions, if any, that your patient has filled in the last twelve months. The information displayed on this report is compiled from pharmacy submissions to the Department, and accurately reflects the information as submitted by the pharmacies.    This report was requested by: Griselda Guevara | Reference #: [id number]    Practitioner Count: 2  Pharmacy Count: 1  Current Opioid Prescriptions: 1  Current Benzodiazepine Prescriptions: 1  Current Stimulant Prescriptions: 0      Patient Demographic Information (PDI)       PDI	First Name	Last Name	Birth Date	Gender	Street Address	[facility]	Zip Code  A	Aide Britton	1965	Female	[phone] [place]	[address]    Prescription Information      PDI Filter:    PDI	My Rx	Current Rx	Drug Type	Rx Written	Rx Dispensed	Drug	Quantity	Days Supply	Prescriber Name	Prescriber MICHAEL #	Payment Method	Dispenser  A	N	Y		06/26/2024	07/26/2024	zolpidem tartrate 10 mg tablet	30	30	Torrey Griffith MD	[id number]	[facility]  A	N	Y	B	06/26/2024	07/15/2024	clonazepam 0.5 mg tablet	60	30	Torrey Griffith MD	[id number]	[facility]  A	N	Y		06/26/2024	07/15/2024	zolpidem tartrate 10 mg tablet	30	30	MoTorrey chavez MD	[id number]	[facility]  A	N	Y	O	07/02/2024	07/08/2024	tramadol hcl 50 mg tablet	60	30	FuzaylBenjamin waggoneriy	[id number]	[facility]  A	N	N	O	06/04/2024	06/19/2024	tramadol hcl 50 mg tablet	60	30	Fuzaylrosaline, Jesus	[id number]	[facility]  A	N	N		04/18/2024	05/16/2024	zolpidem tartrate 10 mg tablet	30	30	Torrey Griffith MD	[id number]	[facility]  A	N	N	O	04/02/2024	04/26/2024	tramadol hcl 50 mg tablet	60	30	Fuzaylrosaline, Jesus	[id number]	[facility]  A	N	N	B	04/18/2024	04/26/2024	clonazepam 0.5 mg tablet	60	30	MoTorrey chavez MD	[id number]	[facility]  A	N	N		04/18/2024	04/26/2024	zolpidem tartrate 10 mg tablet	30	30	MoussTorrey nova MD	[id number]	[facility]  A	N	N		02/22/2024	03/20/2024	zolpidem tartrate 10 mg tablet	30	30	Torrey Griffith MD	[id number]	[facility]  A	N	N	O	02/06/2024	02/22/2024	tramadol hcl 50 mg tablet	60	30	Jesus Eliozndo	[id number]	[facility]  A	N	N	B	02/22/2024	02/22/2024	clonazepam 0.5 mg tablet	60	30	MoTorrey chavez MD	[id number]	[facility]  A	N	N		02/22/2024	02/22/2024	zolpidem tartrate 10 mg tablet	30	30	MoTorrey chavez MD	[id number]	[facility]  A	N	N		12/20/2023	01/17/2024	zolpidem tartrate 10 mg tablet	30	30	MoTorrey chavez MD	[id number]	[facility]  A	N	N	O	01/09/2024	01/17/2024	tramadol hcl 50 mg tablet	60	30	Jesus Elizondo	[id number]	[facility]  A	N	N		12/20/2023	12/21/2023	zolpidem tartrate 10 mg tablet	30	30	Torrey Griffith MD	[id number]	[facility]  A	N	N	B	12/20/2023	12/21/2023	clonazepam 0.5 mg tablet	60	30	Torrey Griffith MD	[id number]	[facility]  A	N	N	O	12/05/2023	12/15/2023	tramadol hcl 50 mg tablet	60	30	AlfredozaylJesus waggoner	[id number]	[facility]  A	N	N		10/12/2023	11/17/2023	zolpidem tartrate 10 mg tablet	30	30	MoussaviTorrey villar MD	[id number]	[facility]  A	N	N	O	10/27/2023	11/17/2023	tramadol hcl 50 mg tablet	60	30	Fuzaylov, Jesus	[id number]	[facility]  A	N	N	O	10/26/2023	10/27/2023	tramadol hcl 50 mg tablet	60	30	Fuzaylov, Jesus	[id number]	[facility]  A	N	N	B	10/12/2023	10/13/2023	clonazepam 0.5 mg tablet	60	30	MoussavianTorrey MD	[id number]	[facility]  A	N	N		10/12/2023	10/13/2023	zolpidem tartrate 10 mg tablet	30	30	MoussavianTorrey MD	[id number]	[facility]  A	N	N		08/09/2023	09/21/2023	zolpidem tartrate 10 mg tablet	30	30	MoussavianTorrey MD	[id number]	[facility]  A	N	N	O	08/23/2023	09/08/2023	tramadol hcl 50 mg tablet	60	30	Fuzaylov, Jesus	[id number]	[facility]  A	N	N		08/09/2023	08/22/2023	zolpidem tartrate 10 mg tablet	30	30	MoussavianTorrey MD	[id number]	[facility]  A	N	N		06/14/2023	08/11/2023	zolpidem tartrate 10 mg tablet	30	30	MoussaviTorrey villar MD	[id number]	[facility]  A	N	N	O	07/26/2023	08/11/2023	tramadol hcl 50 mg tablet	60	30	CourtGavi	[id number]	[facility]  A	N	N	B	08/09/2023	08/11/2023	clonazepam 0.5 mg tablet	60	30	MoussavianTorrey MD	[id number]	[facility]    * - Details of Drug Type : O = Opioid, B = Benzodiazepine, S = Stimulant    * - Drugs marked with an asterisk are compound drugs. If the compound drug is made up of more than one controlled substance, then each controlled substance will be a separate row in the table.

## 2024-08-02 NOTE — PROGRESS NOTE ADULT - ASSESSMENT
59 y/o female admitted to medicine who urology is consulted for back pain and history of frequent UTIs. Ucx negative. AVSS. Labs wnl.    Recommendations:   - F/u CTAP  - Strict Ins and outs  - Urine culture negative  -may follow up in the office with Dr. Esteban   -Discussed with Dr. Esteban

## 2024-08-02 NOTE — DISCHARGE NOTE NURSING/CASE MANAGEMENT/SOCIAL WORK - NSDCFUADDAPPT_GEN_ALL_CORE_FT
APPTS ARE READY TO BE MADE: [X] YES    Best Family or Patient Contact (if needed):    Additional Information about above appointments (if needed):    1: Rheumatology at [phone] #[phone]      Other comments or requests:

## 2024-08-02 NOTE — PROGRESS NOTE ADULT - PROBLEM SELECTOR PROBLEM 1
Intractable back pain

## 2024-08-02 NOTE — PROGRESS NOTE ADULT - PROBLEM SELECTOR PLAN 2
p/w 10 day history of dysuria  UA neg  UCx WN lisa  Urology following with rec;s CT A/P p/w 10 day history of dysuria  UA neg  UCx WN lisa  Urology following with rec;s

## 2024-08-02 NOTE — PROGRESS NOTE ADULT - PROBLEM SELECTOR PROBLEM 3
Frequent UTI
GERD (gastroesophageal reflux disease)

## 2024-08-02 NOTE — CONSULT NOTE ADULT - SUBJECTIVE AND OBJECTIVE BOX
To be completed                  Per report of L-spine CT Feb 2018:  "COMPARISON: None.    FINDINGS:  Straightening of the normal lumbar lordosis. No spondylolisthesis.  Vertebral body heights are preserved. No evidence of lumbar spine   fracture.    At L5-S1 level, there is disc space narrowing and anterior vertebral body   osteophytes as well as tiny posterior vertebral body osteophytes   associated with the disc bulge and vacuum disc phenomenon, causing mild   bilateral neural foraminal narrowing. No significant bony encroachment on   the spinal canal or neural foramina at the other levels in the lumbar   spine.    The sacroiliac joints are preserved.  Visualized paraspinal muscles are unremarkable.    Other:  Small calcification in the uterus. Trace free pelvic fluid. Visualized   bowel is unobstructed.    IMPRESSION:  No CT evidence of lumbar spine fracture.    L5-S1 spondylosis causing mild bilateral neural foraminal narrowing.      "      X To be completed                  Per report of L-spine CT Feb 2018:  "COMPARISON: None.    FINDINGS:  Straightening of the normal lumbar lordosis. No spondylolisthesis.  Vertebral body heights are preserved. No evidence of lumbar spine   fracture.    At L5-S1 level, there is disc space narrowing and anterior vertebral body   osteophytes as well as tiny posterior vertebral body osteophytes   associated with the disc bulge and vacuum disc phenomenon, causing mild   bilateral neural foraminal narrowing. No significant bony encroachment on   the spinal canal or neural foramina at the other levels in the lumbar   spine.    The sacroiliac joints are preserved.  Visualized paraspinal muscles are unremarkable.    Other:  Small calcification in the uterus. Trace free pelvic fluid. Visualized   bowel is unobstructed.    IMPRESSION:  No CT evidence of lumbar spine fracture.    L5-S1 spondylosis causing mild bilateral neural foraminal narrowing."      EXAMINATION    Awake, alert.  Obese by visual estimation.  [place] Island[place] accent.  Good English fluency; normal comprehension, speech articulation, rate of speaking.  PERRL; EOMI; confrontation visual fields intact.  Normal facial/lingual movements.    No UE drift.  Normal symmetric strength of UEs on confrontation testing.  Guarding o f motor effort of both LEs; it is not possible to test for maximal strength due to the pain.  Pain is variably elicited (depending on the test maneuver) in the low back, knees (L knee worse), ankles, mid feet.       P/LT sensation grossly intact; no extinction on DSS.        Reflex                           Right    Left   Comment    Biceps                             2         2  Triceps                            2         2  Martins                       present present  Patellar                            2        2  Gastroc                            2        2  Plantar                           flexor flexor                       Per report of L-spine CT Feb 2018:  "COMPARISON: None.    FINDINGS:  Straightening of the normal lumbar lordosis. No spondylolisthesis.  Vertebral body heights are preserved. No evidence of lumbar spine   fracture.    At L5-S1 level, there is disc space narrowing and anterior vertebral body   osteophytes as well as tiny posterior vertebral body osteophytes   associated with the disc bulge and vacuum disc phenomenon, causing mild   bilateral neural foraminal narrowing. No significant bony encroachment on   the spinal canal or neural foramina at the other levels in the lumbar   spine.    The sacroiliac joints are preserved.  Visualized paraspinal muscles are unremarkable.    Other:  Small calcification in the uterus. Trace free pelvic fluid. Visualized   bowel is unobstructed.    IMPRESSION:  No CT evidence of lumbar spine fracture.    L5-S1 spondylosis causing mild bilateral neural foraminal narrowing."      EXAMINATION    Awake, alert.  Obese by visual estimation.  [place] accent.  Good English fluency; normal comprehension, speech articulation, rate of speaking.  PERRL; EOMI; confrontation visual fields intact.  Normal facial/lingual movements.    No UE drift.  Normal symmetric strength of UEs on confrontation testing.  Guarding o f motor effort of both LEs; it is not possible to test for maximal strength due to the pain.  Pain is variably elicited (depending on the test maneuver) in the low back, knees (L knee worse), ankles, mid feet.       P/LT sensation grossly intact; no extinction on DSS.        Reflex                           Right    Left   Comment    Biceps                             2         2  Triceps                            2         2  Martins                       present present  Patellar                            2        2  Gastroc                            2        2  Plantar                           flexor flexor     History from Admission H&P 7/31/23.    <Start of quote(s) from H&P>  " History of Present Illness:  Reason for Admission: Intractable back pain  History of Present Illness:   Patient is a 58F, from home ambulates with cane, PMHx of chronic back pain, GERD, pre-DM and frequent UTI, who presents with 5 day history of intractable back pain. Patient woke up on Friday morning with mid lower back pain that radiated down to the leg and frontal ingunial area. She took tramadol 50mg BID, ibuprofen 800mg qd, baclofen 10mg for her back pain but the pain did not resolve so she presented to the ED. Patient also reports burning on urination for the last 10 days. Patient reports the pain is so severe that she is not able to move at all or even lift her legs to ambulate. Patient denies urinary or stool incontinence, saddle numbness or leg numbness. She denies leg numbness or weakness and reports the difficulty ambulation is strictly due to pain. Patient endorses constipation but denies other systemic symptoms including headahce, fever, chills, nausea, vomiting, abdominal pain, arm weakness/numbness, chest pain, palpitation. Patient denies any trauma to the back, reports no out of ordinary exercise at physical therapy last Monday. Patient reports only the morphine helped in the ED.    . . .     Review of Systems: CONSTITUTIONAL: No fever, weight loss, or fatigue  EYES: No eye pain, visual disturbances, or discharge  ENT:  No difficulty hearing, tinnitus, vertigo; No sinus or throat pain  NECK: No pain or stiffness  RESPIRATORY: No cough, wheezing, chills or hemoptysis; No Shortness of Breath  CARDIOVASCULAR: No chest pain, palpitations, passing out, dizziness, or leg swelling  GASTROINTESTINAL: +constipation; No abdominal or epigastric pain. No nausea, vomiting, or hematemesis; No diarrhea. No melena or hematochezia.  GENITOURINARY: +dysuria; No frequency, hematuria, or incontinence  NEUROLOGICAL: No headaches, memory loss, loss of strength, numbness, or tremors  SKIN: No itching, burning, rashes, or lesions   LYMPH Nodes: No enlarged glands  MUSCULOSKELETAL: Mid-lower back pain; No joint pain or swelling; No extremity pain  PSYCHIATRIC: No depression, anxiety, mood swings, or difficulty sleeping  HEME/LYMPH: No easy bruising, or bleeding gums  ALLERGY AND IMMUNOLOGIC: No hives or eczema   . . .     Home Medications:   * Patient Currently Takes Medications as of 31-Jul-2024 11:49 documented in Structured Notes  · 	DULoxetine 60 mg oral delayed release capsule: 1 cap(s) orally once a day  · 	Incruse Ellipta 62.5 mcg/inh inhalation powder: 1 puff(s) inhaled once a day  · 	traMADol 50 mg oral tablet: Last Dose Taken:  , 1 tab(s) orally 2 times a day as needed for  severe pain  · 	Dexilant 60 mg oral delayed release capsule: 1 cap(s) orally once a day  · 	methenamine hippurate 1 g oral tablet: 1 tab(s) orally once a day  · 	baclofen 10 mg oral tablet: Last Dose Taken:  , 1 tab(s) orally once a day as needed for  mild pain  · 	gabapentin 300 mg oral tablet: 1 tab(s) orally 3 times a day  · 	doxepin 50 mg oral capsule: 1 cap(s) orally once a day  · 	zolpidem 10 mg oral tablet: 1 tab(s) orally once a day   . . .   PAST MEDICAL HISTORY:  Asthma   Chronic back pain   DVT (deep venous thrombosis)   High cholesterol   Pyelitis   Urinary tract infection   Vertigo.     PAST SURGICAL HISTORY:  No significant past surgical history.     Social History:  · Substance use	No"  <End of quote(s) from H&P>                    Per report of L-spine CT Feb 2018:  "COMPARISON: None.    FINDINGS:  Straightening of the normal lumbar lordosis. No spondylolisthesis.  Vertebral body heights are preserved. No evidence of lumbar spine   fracture.    At L5-S1 level, there is disc space narrowing and anterior vertebral body   osteophytes as well as tiny posterior vertebral body osteophytes   associated with the disc bulge and vacuum disc phenomenon, causing mild   bilateral neural foraminal narrowing. No significant bony encroachment on   the spinal canal or neural foramina at the other levels in the lumbar   spine.    The sacroiliac joints are preserved.  Visualized paraspinal muscles are unremarkable.    Other:  Small calcification in the uterus. Trace free pelvic fluid. Visualized   bowel is unobstructed.    IMPRESSION:  No CT evidence of lumbar spine fracture.    L5-S1 spondylosis causing mild bilateral neural foraminal narrowing."      EXAMINATION    Awake, alert.  Obese by visual estimation.  [place] accent.  Good English fluency; normal comprehension, speech articulation, rate of speaking.  PERRL; EOMI; confrontation visual fields intact.  Normal facial/lingual movements.    No UE drift.  Normal symmetric strength of UEs on confrontation testing.  Guarding o f motor effort of both LEs; it is not possible to test for maximal strength due to the pain.  Pain is variably elicited (depending on the test maneuver) in the low back, knees (L knee worse), ankles, mid feet.       P/LT sensation grossly intact; no extinction on DSS.        Reflex                           Right    Left   Comment    Biceps                             2         2  Triceps                            2         2  Martins                       present present  Patellar                            2        2  Gastroc                            2        2  Plantar                           flexor flexor     History from Admission H&P 7/31/23.    <Start of quote(s) from H&P>  " History of Present Illness:  Reason for Admission: Intractable back pain  History of Present Illness:   Patient is a 58F, from home ambulates with cane, PMHx of chronic back pain, GERD, pre-DM and frequent UTI, who presents with 5 day history of intractable back pain. Patient woke up on Friday morning with mid lower back pain that radiated down to the leg and frontal ingunial area. She took tramadol 50mg BID, ibuprofen 800mg qd, baclofen 10mg for her back pain but the pain did not resolve so she presented to the ED. Patient also reports burning on urination for the last 10 days. Patient reports the pain is so severe that she is not able to move at all or even lift her legs to ambulate. Patient denies urinary or stool incontinence, saddle numbness or leg numbness. She denies leg numbness or weakness and reports the difficulty ambulation is strictly due to pain. Patient endorses constipation but denies other systemic symptoms including headahce, fever, chills, nausea, vomiting, abdominal pain, arm weakness/numbness, chest pain, palpitation. Patient denies any trauma to the back, reports no out of ordinary exercise at physical therapy last Monday. Patient reports only the morphine helped in the ED.    . . .     Review of Systems: CONSTITUTIONAL: No fever, weight loss, or fatigue  EYES: No eye pain, visual disturbances, or discharge  ENT:  No difficulty hearing, tinnitus, vertigo; No sinus or throat pain  NECK: No pain or stiffness  RESPIRATORY: No cough, wheezing, chills or hemoptysis; No Shortness of Breath  CARDIOVASCULAR: No chest pain, palpitations, passing out, dizziness, or leg swelling  GASTROINTESTINAL: +constipation; No abdominal or epigastric pain. No nausea, vomiting, or hematemesis; No diarrhea. No melena or hematochezia.  GENITOURINARY: +dysuria; No frequency, hematuria, or incontinence  NEUROLOGICAL: No headaches, memory loss, loss of strength, numbness, or tremors  SKIN: No itching, burning, rashes, or lesions   LYMPH Nodes: No enlarged glands  MUSCULOSKELETAL: Mid-lower back pain; No joint pain or swelling; No extremity pain  PSYCHIATRIC: No depression, anxiety, mood swings, or difficulty sleeping  HEME/LYMPH: No easy bruising, or bleeding gums  ALLERGY AND IMMUNOLOGIC: No hives or eczema   . . .     Home Medications:   * Patient Currently Takes Medications as of 31-Jul-2024 11:49 documented in Structured Notes  · 	DULoxetine 60 mg oral delayed release capsule: 1 cap(s) orally once a day  · 	Incruse Ellipta 62.5 mcg/inh inhalation powder: 1 puff(s) inhaled once a day  · 	traMADol 50 mg oral tablet: Last Dose Taken:  , 1 tab(s) orally 2 times a day as needed for  severe pain  · 	Dexilant 60 mg oral delayed release capsule: 1 cap(s) orally once a day  · 	methenamine hippurate 1 g oral tablet: 1 tab(s) orally once a day  · 	baclofen 10 mg oral tablet: Last Dose Taken:  , 1 tab(s) orally once a day as needed for  mild pain  · 	gabapentin 300 mg oral tablet: 1 tab(s) orally 3 times a day  · 	doxepin 50 mg oral capsule: 1 cap(s) orally once a day  · 	zolpidem 10 mg oral tablet: 1 tab(s) orally once a day   . . .   PAST MEDICAL HISTORY:  Asthma   Chronic back pain   DVT (deep venous thrombosis)   High cholesterol   Pyelitis   Urinary tract infection   Vertigo.     PAST SURGICAL HISTORY:  No significant past surgical history.  Neurology Consult Attending comment: she had R basal thumb operation 7 years ago.  She had meniscus surgery L knee several years ago     Social History:  · Substance use	No"  <End of quote(s) from H&P>        Per report of L-spine CT Feb 2018:  "COMPARISON: None.    FINDINGS:  Straightening of the normal lumbar lordosis. No spondylolisthesis.  Vertebral body heights are preserved. No evidence of lumbar spine   fracture.    At L5-S1 level, there is disc space narrowing and anterior vertebral body   osteophytes as well as tiny posterior vertebral body osteophytes   associated with the disc bulge and vacuum disc phenomenon, causing mild   bilateral neural foraminal narrowing. No significant bony encroachment on   the spinal canal or neural foramina at the other levels in the lumbar   spine.    The sacroiliac joints are preserved.  Visualized paraspinal muscles are unremarkable.    Other:  Small calcification in the uterus. Trace free pelvic fluid. Visualized   bowel is unobstructed.    IMPRESSION:  No CT evidence of lumbar spine fracture.    L5-S1 spondylosis causing mild bilateral neural foraminal narrowing."      EXAMINATION    Awake, alert.  Obese by visual estimation.  [place] accent.  Good English fluency; normal comprehension, speech articulation, rate of speaking.  PERRL; EOMI; confrontation visual fields intact.  Normal facial/lingual movements.    No UE drift.  Normal symmetric strength of UEs on confrontation testing.  Guarding o f motor effort of both LEs; it is not possible to test for maximal strength due to the pain.  Pain is variably elicited (depending on the test maneuver) in the low back, knees (L knee worse), ankles, mid feet.       P/LT sensation grossly intact; no extinction on DSS.        Reflex                           Right    Left   Comment    Biceps                             2         2  Triceps                            2         2  Martins                       present present  Patellar                            2        2  Gastroc                            2        2  Plantar                           flexor flexor    Passive forced flexion of IP joints of either hand --> severe pain.    R MCP joint very very tender to palpation.  L patella very painful on compression.   History from Admission H&P 7/31/23.    <Start of quote(s) from H&P>  " History of Present Illness:  Reason for Admission: Intractable back pain  History of Present Illness:   Patient is a 58F, from home ambulates with cane, PMHx of chronic back pain, GERD, pre-DM and frequent UTI, who presents with 5 day history of intractable back pain. Patient woke up on Friday morning with mid lower back pain that radiated down to the leg and frontal ingunial area. She took tramadol 50mg BID, ibuprofen 800mg qd, baclofen 10mg for her back pain but the pain did not resolve so she presented to the ED. Patient also reports burning on urination for the last 10 days. Patient reports the pain is so severe that she is not able to move at all or even lift her legs to ambulate. Patient denies urinary or stool incontinence, saddle numbness or leg numbness. She denies leg numbness or weakness and reports the difficulty ambulation is strictly due to pain. Patient endorses constipation but denies other systemic symptoms including headahce, fever, chills, nausea, vomiting, abdominal pain, arm weakness/numbness, chest pain, palpitation. Patient denies any trauma to the back, reports no out of ordinary exercise at physical therapy last Monday. Patient reports only the morphine helped in the ED.    . . .     Review of Systems: CONSTITUTIONAL: No fever, weight loss, or fatigue  EYES: No eye pain, visual disturbances, or discharge  ENT:  No difficulty hearing, tinnitus, vertigo; No sinus or throat pain  NECK: No pain or stiffness  RESPIRATORY: No cough, wheezing, chills or hemoptysis; No Shortness of Breath  CARDIOVASCULAR: No chest pain, palpitations, passing out, dizziness, or leg swelling  GASTROINTESTINAL: +constipation; No abdominal or epigastric pain. No nausea, vomiting, or hematemesis; No diarrhea. No melena or hematochezia.  GENITOURINARY: +dysuria; No frequency, hematuria, or incontinence  NEUROLOGICAL: No headaches, memory loss, loss of strength, numbness, or tremors  SKIN: No itching, burning, rashes, or lesions   LYMPH Nodes: No enlarged glands  MUSCULOSKELETAL: Mid-lower back pain; No joint pain or swelling; No extremity pain  PSYCHIATRIC: No depression, anxiety, mood swings, or difficulty sleeping  HEME/LYMPH: No easy bruising, or bleeding gums  ALLERGY AND IMMUNOLOGIC: No hives or eczema   . . .     Home Medications:   * Patient Currently Takes Medications as of 31-Jul-2024 11:49 documented in Structured Notes  · 	DULoxetine 60 mg oral delayed release capsule: 1 cap(s) orally once a day  · 	Incruse Ellipta 62.5 mcg/inh inhalation powder: 1 puff(s) inhaled once a day  · 	traMADol 50 mg oral tablet: Last Dose Taken:  , 1 tab(s) orally 2 times a day as needed for  severe pain  · 	Dexilant 60 mg oral delayed release capsule: 1 cap(s) orally once a day  · 	methenamine hippurate 1 g oral tablet: 1 tab(s) orally once a day  · 	baclofen 10 mg oral tablet: Last Dose Taken:  , 1 tab(s) orally once a day as needed for  mild pain  · 	gabapentin 300 mg oral tablet: 1 tab(s) orally 3 times a day  · 	doxepin 50 mg oral capsule: 1 cap(s) orally once a day  · 	zolpidem 10 mg oral tablet: 1 tab(s) orally once a day   . . .   PAST MEDICAL HISTORY:  Asthma   Chronic back pain   DVT (deep venous thrombosis)   High cholesterol   Pyelitis   Urinary tract infection   Vertigo.   Neurology Consult Attending comment:  she reports she has a non-traumatic "tear" L shoulder (?rotator cuff degeneration).       PAST SURGICAL HISTORY:  No significant past surgical history.  Neurology Consult Attending comment: she had R basal thumb operation 7 years ago.  She had meniscus surgery L knee several years ago     Social History:  · Substance use	No"  <End of quote(s) from H&P>        Per report of L-spine CT Feb 2018:  "COMPARISON: None.    FINDINGS:  Straightening of the normal lumbar lordosis. No spondylolisthesis.  Vertebral body heights are preserved. No evidence of lumbar spine   fracture.    At L5-S1 level, there is disc space narrowing and anterior vertebral body   osteophytes as well as tiny posterior vertebral body osteophytes   associated with the disc bulge and vacuum disc phenomenon, causing mild   bilateral neural foraminal narrowing. No significant bony encroachment on   the spinal canal or neural foramina at the other levels in the lumbar   spine.    The sacroiliac joints are preserved.  Visualized paraspinal muscles are unremarkable.    Other:  Small calcification in the uterus. Trace free pelvic fluid. Visualized   bowel is unobstructed.    IMPRESSION:  No CT evidence of lumbar spine fracture.    L5-S1 spondylosis causing mild bilateral neural foraminal narrowing."      Pt reports she saw a rheumatologist 3 years ago and had negative rheum blood tests.      EXAMINATION    Awake, alert.  Obese by visual estimation.  N[place]alese accent.  Good English fluency; normal comprehension, speech articulation, rate of speaking.  PERRL; EOMI; confrontation visual fields intact.  Normal facial/lingual movements.    No UE drift.  Normal symmetric strength of UEs on confrontation testing.  Guarding o f motor effort of both LEs; it is not possible to test for maximal strength due to the pain.  Pain is variably elicited (depending on the test maneuver) in the low back, knees (L knee worse), ankles, mid feet.       P/LT sensation grossly intact; no extinction on DSS.        Reflex                           Right    Left   Comment    Biceps                             2         2  Triceps                            2         2  Martins                       present present  Patellar                            2        2  Gastroc                            2        2  Plantar                           flexor flexor    Multiple small PIP and DIP joints of both hands.  Passive forced flexion of IP joints of either hand --> severe pain.    R MCP joint very very tender to palpation.  L patella very painful on compression.  R hallux very tender on passive deflection.  Passive rotation of either hip (Pt supine in bed) elicits sharp pain.    Loss of cervical and lumbar lordoses and thoracic kyphosis.  Moderate TTP of paraspinals at multiple levels.

## 2024-08-02 NOTE — DISCHARGE NOTE NURSING/CASE MANAGEMENT/SOCIAL WORK - PATIENT PORTAL LINK FT
You can access the FollowMyHealth Patient Portal offered by [facility] by registering at the following website: [email or web address]. By joining [facility]’s FollowMyHealth portal, you will also be able to view your health information using other applications (apps) compatible with our system.

## 2024-08-02 NOTE — PROGRESS NOTE ADULT - PROBLEM SELECTOR PROBLEM 2
Chronic back pain
Frequent UTI
GERD (gastroesophageal reflux disease)
Frequent UTI
Chronic back pain

## 2024-08-02 NOTE — PROGRESS NOTE ADULT - PROBLEM SELECTOR PLAN 3
Hx of GERD on PPI  c/w home meds
urology consult appreciated  Abd/pelvis CT pending
Hx of GERD on PPI  c/w home meds

## 2024-08-02 NOTE — PROGRESS NOTE ADULT - REASON FOR ADMISSION
Intractable back pain

## 2024-08-02 NOTE — PROGRESS NOTE ADULT - SUBJECTIVE AND OBJECTIVE BOX
Subjective  AVSS. Still complaining of suprapubic pain and dysuria.     Objective    Vital signs  T(F): , Max: 98 (08-01-24 @ 11:51)  HR: 65 (08-02-24 @ 05:23)  BP: 128/60 (08-02-24 @ 05:23)  SpO2: 94% (08-02-24 @ 05:23)  Wt(kg): --    Output       Gen: NAD  Abd: soft, nontender, nondistended  : Voiding    Labs      08-02 @ 06:30    WBC 6.23  / Hct 35.7  / SCr 0.71     08-01 @ 06:23    WBC 7.17  / Hct 34.5  / SCr 0.58         Urinalysis with Rflx Culture (collected 07-31-24 @ 03:00)    Culture - Urine (collected 07-31-24 @ 03:00)  Source: Clean Catch  Final Report (08-01-24 @ 11:59):    <10,000 CFU/mL Normal Urogenital Priya        Imaging
LAYNE BONILLA  58y  Female      Patient is a 58y old  Female who presents with a chief complaint of Intractable back pain (01 Aug 2024 10:24)      INTERVAL HPI/OVERNIGHT EVENTS:        REVIEW OF SYSTEMS:  CONSTITUTIONAL: No fever, weight loss, or fatigue  EYES: No eye pain, visual disturbances, or discharge  ENMT:   No sinus or throat pain  NECK: No pain or stiffness  BREASTS: No pain, masses, or nipple discharge  RESPIRATORY: No cough, wheezing, chills or hemoptysis; No shortness of breath  CARDIOVASCULAR: No chest pain, palpitations, dizziness, or leg swelling  GASTROINTESTINAL: No abdominal or epigastric pain. No nausea, vomiting, or hematemesis; No diarrhea or constipation. No melena or hematochezia.  GENITOURINARY: No hematuria  NEUROLOGICAL: demented    SKIN: No itching, burning, rashes, or lesions   LYMPH NODES: No enlarged glands  ENDOCRINE: No heat or cold intolerance; No hair loss  MUSCULOSKELETAL: No joint pain    HEME/LYMPH: No easy bruising, or bleeding gums  ALLERY AND IMMUNOLOGIC: No hives or eczema    T(C): 36.7 (08-01-24 @ 11:51), Max: 36.7 (08-01-24 @ 05:22)  HR: 66 (08-01-24 @ 11:51) (66 - 79)  BP: 129/61 (08-01-24 @ 11:51) (119/67 - 141/81)  RR: 14 (08-01-24 @ 11:51) (14 - 18)  SpO2: 96% (08-01-24 @ 11:51) (92% - 96%)  Wt(kg): --Vital Signs Last 24 Hrs  T(C): 36.7 (01 Aug 2024 11:51), Max: 36.7 (01 Aug 2024 05:22)  T(F): 98 (01 Aug 2024 11:51), Max: 98.1 (01 Aug 2024 05:22)  HR: 66 (01 Aug 2024 11:51) (66 - 79)  BP: 129/61 (01 Aug 2024 11:51) (119/67 - 141/81)  BP(mean): --  RR: 14 (01 Aug 2024 11:51) (14 - 18)  SpO2: 96% (01 Aug 2024 11:51) (92% - 96%)    Parameters below as of 01 Aug 2024 11:51  Patient On (Oxygen Delivery Method): room air        PHYSICAL EXAM:  GENERAL: NAD, well-groomed, well-developed  HEAD:  Atraumatic, Normocephalic  EYES: EOMI, PERRLA, conjunctiva and sclera clear  ENMT: No tonsillar erythema, exudates, or enlargement; Moist mucous membranes, Good dentition, No lesions  NECK: Supple, No JVD, Normal thyroid  NERVOUS SYSTEM:  demented.no focal neurodeficit.  CHEST/LUNG: Clear to percussion bilaterally; No rales, rhonchi, wheezing, or rubs  HEART: Regular rate and rhythm; No murmurs, rubs, or gallops  ABDOMEN: Soft, Nontender, Nondistended; Bowel sounds present  EXTREMITIES:  2+ Peripheral Pulses, No clubbing, cyanosis.  LYMPH: No lymphadenopathy noted  SKIN: No rashes or lesions    Consultant(s) Notes Reviewed:  [x ] YES  [ ] NO  Care Discussed with Consultants/Other Providers [ x] YES  [ ] NO    LABS:                        11.9   7.17  )-----------( 211      ( 01 Aug 2024 06:23 )             34.5     08-01    137  |  105  |  19<H>  ----------------------------<  105<H>  4.4   |  26  |  0.58    Ca    8.9      01 Aug 2024 06:23    TPro  7.0  /  Alb  3.7  /  TBili  0.5  /  DBili  x   /  AST  29  /  ALT  62<H>  /  AlkPhos  57  08-01      Urinalysis Basic - ( 01 Aug 2024 06:23 )    Color: x / Appearance: x / SG: x / pH: x  Gluc: 105 mg/dL / Ketone: x  / Bili: x / Urobili: x   Blood: x / Protein: x / Nitrite: x   Leuk Esterase: x / RBC: x / WBC x   Sq Epi: x / Non Sq Epi: x / Bacteria: x      CAPILLARY BLOOD GLUCOSE            Urinalysis Basic - ( 01 Aug 2024 06:23 )    Color: x / Appearance: x / SG: x / pH: x  Gluc: 105 mg/dL / Ketone: x  / Bili: x / Urobili: x   Blood: x / Protein: x / Nitrite: x   Leuk Esterase: x / RBC: x / WBC x   Sq Epi: x / Non Sq Epi: x / Bacteria: x        RADIOLOGY & ADDITIONAL TESTS:    Imaging Personally Reviewed:  [ ] YES  [ ] NO
NP Note discussed with  Primary Attending    Patient is a 58y old  Female who presents with a chief complaint of Intractable back pain (31 Jul 2024 23:21)      INTERVAL HPI/OVERNIGHT EVENTS: no new complaints    MEDICATIONS  (STANDING):  acetaminophen   IVPB .. 1000 milliGRAM(s) IV Intermittent once  DULoxetine 60 milliGRAM(s) Oral daily  enoxaparin Injectable 40 milliGRAM(s) SubCutaneous every 24 hours  gabapentin 400 milliGRAM(s) Oral every 8 hours  ketorolac   Injectable 30 milliGRAM(s) IV Push every 8 hours  lidocaine   4% Patch 1 Patch Transdermal daily  methocarbamol 750 milliGRAM(s) Oral every 8 hours  naloxone Injectable 0.4 milliGRAM(s) IV Push once  pantoprazole    Tablet 40 milliGRAM(s) Oral before breakfast  polyethylene glycol 3350 17 Gram(s) Oral daily  senna 2 Tablet(s) Oral at bedtime    MEDICATIONS  (PRN):  bisacodyl 5 milliGRAM(s) Oral daily PRN Constipation  ondansetron Injectable 4 milliGRAM(s) IV Push every 4 hours PRN Nausea and/or Vomiting  traMADol 50 milliGRAM(s) Oral every 8 hours PRN Severe Pain (7 - 10)      __________________________________________________  REVIEW OF SYSTEMS:    CONSTITUTIONAL: No fever,   EYES: no acute visual disturbances  NECK: No pain or stiffness  RESPIRATORY: No cough; No shortness of breath  CARDIOVASCULAR: No chest pain, no palpitations  GASTROINTESTINAL: No pain. No nausea or vomiting; No diarrhea   NEUROLOGICAL: No headache or numbness, no tremors  MUSCULOSKELETAL: Back pain   GENITOURINARY: no dysuria, no frequency, no hesitancy  PSYCHIATRY: no depression , no anxiety  ALL OTHER  ROS negative        Vital Signs Last 24 Hrs  T(C): 36.7 (01 Aug 2024 05:22), Max: 36.7 (31 Jul 2024 12:23)  T(F): 98.1 (01 Aug 2024 05:22), Max: 98.1 (01 Aug 2024 05:22)  HR: 76 (01 Aug 2024 05:22) (63 - 77)  BP: 119/67 (01 Aug 2024 05:22) (119/67 - 141/81)  BP(mean): --  RR: 18 (01 Aug 2024 05:22) (14 - 18)  SpO2: 94% (01 Aug 2024 05:22) (92% - 96%)    Parameters below as of 01 Aug 2024 05:22  Patient On (Oxygen Delivery Method): room air        ________________________________________________  PHYSICAL EXAM:  GENERAL: NAD  HEENT: Normocephalic;  conjunctivae and sclerae clear; moist mucous membranes;   NECK : supple  CHEST/LUNG: Clear to auscultation bilaterally with good air entry   HEART: S1 S2  regular; no murmurs, gallops or rubs  ABDOMEN: Soft, Nontender, Nondistended; Bowel sounds present  EXTREMITIES: no cyanosis; no edema; no calf tenderness  SKIN: warm and dry; no rash  NERVOUS SYSTEM:  Awake and alert; Oriented  to place, person and time ; no new deficits    _________________________________________________  LABS:                        11.9   7.17  )-----------( 211      ( 01 Aug 2024 06:23 )             34.5     08-01    137  |  105  |  19<H>  ----------------------------<  105<H>  4.4   |  26  |  0.58    Ca    8.9      01 Aug 2024 06:23    TPro  7.0  /  Alb  3.7  /  TBili  0.5  /  DBili  x   /  AST  29  /  ALT  62<H>  /  AlkPhos  57  08-01      Urinalysis Basic - ( 01 Aug 2024 06:23 )    Color: x / Appearance: x / SG: x / pH: x  Gluc: 105 mg/dL / Ketone: x  / Bili: x / Urobili: x   Blood: x / Protein: x / Nitrite: x   Leuk Esterase: x / RBC: x / WBC x   Sq Epi: x / Non Sq Epi: x / Bacteria: x      CAPILLARY BLOOD GLUCOSE            RADIOLOGY & ADDITIONAL TESTS:        Imaging Personally Reviewed:  YES    Consultant(s) Notes Reviewed:   YES    Plan of care was discussed with patient and /or primary care giver; all questions and concerns were addressed and care was aligned with patient's wishes.    
LAYNE BONILLA  58y  Female      Patient is a 58y old  Female who presents with a chief complaint of Intractable back pain (2024 12:27)      INTERVAL HPI/OVERNIGHT EVENTS:        REVIEW OF SYSTEMS:  CONSTITUTIONAL: No fever, weight loss, or fatigue  EYES: No eye pain, visual disturbances, or discharge  ENMT:   No sinus or throat pain  NECK: No pain or stiffness  BREASTS: No pain, masses, or nipple discharge  RESPIRATORY: No cough, wheezing, chills or hemoptysis; No shortness of breath  CARDIOVASCULAR: No chest pain, palpitations, dizziness, or leg swelling  GASTROINTESTINAL: No abdominal or epigastric pain. No nausea, vomiting, or hematemesis; No diarrhea or constipation. No melena or hematochezia.  GENITOURINARY: No hematuria  NEUROLOGICAL: demented    SKIN: No itching, burning, rashes, or lesions   LYMPH NODES: No enlarged glands  ENDOCRINE: No heat or cold intolerance; No hair loss  MUSCULOSKELETAL: back pain    HEME/LYMPH: No easy bruising, or bleeding gums  ALLERY AND IMMUNOLOGIC: No hives or eczema    T(C): 36.3 (24 @ 21:00), Max: 36.7 (24 @ 12:23)  HR: 77 (24 @ 21:00) (63 - 77)  BP: 141/81 (24 @ 21:00) (117/78 - 141/81)  RR: 16 (24 @ 21:00) (14 - 18)  SpO2: 92% (24 @ 21:00) (92% - 97%)  Wt(kg): --Vital Signs Last 24 Hrs  T(C): 36.3 (2024 21:00), Max: 36.7 (2024 12:23)  T(F): 97.3 (2024 21:00), Max: 98 (2024 12:23)  HR: 77 (2024 21:00) (63 - 77)  BP: 141/81 (2024 21:00) (117/78 - 141/81)  BP(mean): --  RR: 16 (2024 21:00) (14 - 18)  SpO2: 92% (2024 21:00) (92% - 97%)    Parameters below as of 2024 21:00  Patient On (Oxygen Delivery Method): room air        PHYSICAL EXAM:  GENERAL: in mild distress  HEAD:  Atraumatic, Normocephalic  EYES: EOMI, PERRLA, conjunctiva and sclera clear  ENMT: No tonsillar erythema, exudates, or enlargement; Moist mucous membranes, Good dentition, No lesions  NECK: Supple, No JVD, Normal thyroid  NERVOUS SYSTEM:  demented.no focal neurodeficit.  CHEST/LUNG: Clear to percussion bilaterally; No rales, rhonchi, wheezing, or rubs  HEART: Regular rate and rhythm; No murmurs, rubs, or gallops  ABDOMEN: Soft, Nontender, Nondistended; Bowel sounds present  EXTREMITIES:  2+ Peripheral Pulses, No clubbing, cyanosis.  LYMPH: No lymphadenopathy noted  SKIN: No rashes or lesions    Consultant(s) Notes Reviewed:  [x ] YES  [ ] NO  Care Discussed with Consultants/Other Providers [ x] YES  [ ] NO    LABS:                        12.1   7.89  )-----------( 232      ( 2024 03:00 )             35.9     07-31    140  |  105  |  23<H>  ----------------------------<  125<H>  4.2   |  26  |  0.86    Ca    9.5      2024 03:00    TPro  8.0  /  Alb  4.3  /  TBili  0.3  /  DBili  x   /  AST  30  /  ALT  65<H>  /  AlkPhos  71  07      Urinalysis Basic - ( 2024 03:00 )    Color: Yellow / Appearance: Clear / S.020 / pH: x  Gluc: 125 mg/dL / Ketone: Negative mg/dL  / Bili: Negative / Urobili: 0.2 mg/dL   Blood: x / Protein: Negative mg/dL / Nitrite: Negative   Leuk Esterase: Small / RBC: 10 /HPF / WBC 5 /HPF   Sq Epi: x / Non Sq Epi: x / Bacteria: Few /HPF      CAPILLARY BLOOD GLUCOSE            Urinalysis Basic - ( 2024 03:00 )    Color: Yellow / Appearance: Clear / S.020 / pH: x  Gluc: 125 mg/dL / Ketone: Negative mg/dL  / Bili: Negative / Urobili: 0.2 mg/dL   Blood: x / Protein: Negative mg/dL / Nitrite: Negative   Leuk Esterase: Small / RBC: 10 /HPF / WBC 5 /HPF   Sq Epi: x / Non Sq Epi: x / Bacteria: Few /HPF        RADIOLOGY & ADDITIONAL TESTS:    Imaging Personally Reviewed:  [ ] YES  [ ] NO
NP Note discussed with  Primary Attending    Patient is a 58y old  Female who presents with a chief complaint of Intractable back pain (01 Aug 2024 18:47)      INTERVAL HPI/OVERNIGHT EVENTS: no new complaints    MEDICATIONS  (STANDING):  bisacodyl Suppository 10 milliGRAM(s) Rectal once  DULoxetine 60 milliGRAM(s) Oral daily  enoxaparin Injectable 40 milliGRAM(s) SubCutaneous every 24 hours  gabapentin 400 milliGRAM(s) Oral every 8 hours  lidocaine   4% Patch 1 Patch Transdermal daily  methocarbamol 1000 milliGRAM(s) Oral every 8 hours  naloxone Injectable 0.4 milliGRAM(s) IV Push once  pantoprazole    Tablet 40 milliGRAM(s) Oral before breakfast  polyethylene glycol 3350 17 Gram(s) Oral daily  senna 2 Tablet(s) Oral at bedtime    MEDICATIONS  (PRN):  acetaminophen     Tablet .. 1000 milliGRAM(s) Oral every 6 hours PRN Moderate Pain (4 - 6)  bisacodyl 5 milliGRAM(s) Oral daily PRN Constipation  ondansetron Injectable 4 milliGRAM(s) IV Push every 4 hours PRN Nausea and/or Vomiting  traMADol 50 milliGRAM(s) Oral every 8 hours PRN Severe Pain (7 - 10)      __________________________________________________  REVIEW OF SYSTEMS:    CONSTITUTIONAL: No fever,   EYES: no acute visual disturbances  NECK: No pain or stiffness  RESPIRATORY: No cough; No shortness of breath  CARDIOVASCULAR: No chest pain, no palpitations  GASTROINTESTINAL: No pain. No nausea or vomiting; No diarrhea   NEUROLOGICAL: No headache or numbness, no tremors  MUSCULOSKELETAL: Back pain,   GENITOURINARY: no dysuria, no frequency,  hesitancy  PSYCHIATRY: no depression , no anxiety  ALL OTHER  ROS negative        Vital Signs Last 24 Hrs  T(C): 36.7 (02 Aug 2024 05:23), Max: 36.7 (01 Aug 2024 11:51)  T(F): 98 (02 Aug 2024 05:23), Max: 98 (01 Aug 2024 11:51)  HR: 65 (02 Aug 2024 05:23) (65 - 79)  BP: 128/60 (02 Aug 2024 05:23) (125/55 - 135/61)  BP(mean): --  RR: 17 (02 Aug 2024 05:23) (14 - 17)  SpO2: 94% (02 Aug 2024 05:23) (92% - 96%)    Parameters below as of 02 Aug 2024 05:23  Patient On (Oxygen Delivery Method): room air        ________________________________________________  PHYSICAL EXAM:  GENERAL: NAD  HEENT: Normocephalic;  conjunctivae and sclerae clear; moist mucous membranes;   NECK : supple  CHEST/LUNG: Clear to auscultation bilaterally with good air entry   HEART: S1 S2  regular; no murmurs, gallops or rubs  ABDOMEN: Soft, Nontender, Nondistended; Bowel sounds present  EXTREMITIES: no cyanosis; no edema; no calf tenderness  SKIN: warm and dry; no rash  NERVOUS SYSTEM:  Awake and alert; Oriented  to place, person and time ; no new deficits    _________________________________________________  LABS:                        12.0   6.23  )-----------( 230      ( 02 Aug 2024 06:30 )             35.7     08-02    138  |  104  |  17  ----------------------------<  98  3.9   |  27  |  0.71    Ca    8.9      02 Aug 2024 06:30    TPro  7.2  /  Alb  3.7  /  TBili  0.3  /  DBili  x   /  AST  26  /  ALT  60  /  AlkPhos  60  08-02      Urinalysis Basic - ( 02 Aug 2024 06:30 )    Color: x / Appearance: x / SG: x / pH: x  Gluc: 98 mg/dL / Ketone: x  / Bili: x / Urobili: x   Blood: x / Protein: x / Nitrite: x   Leuk Esterase: x / RBC: x / WBC x   Sq Epi: x / Non Sq Epi: x / Bacteria: x      CAPILLARY BLOOD GLUCOSE            RADIOLOGY & ADDITIONAL TESTS:        Imaging Personally Reviewed:  YES    Consultant(s) Notes Reviewed:   YES    Plan of care was discussed with patient and /or primary care giver; all questions and concerns were addressed and care was aligned with patient's wishes.    
Source of information: LAYNE BONILLA, Chart review  Patient language: English  : n/a    HPI:  Patient is a 58F, from home ambulates with cane, PMHx of chronic back pain, GERD, pre-DM and frequent UTI, who presents with 5 day history of intractable back pain. Patient woke up on Friday morning with mid lower back pain that radiated down to the leg and frontal ingunial area. She took tramadol 50mg BID, ibuprofen 800mg qd, baclofen 10mg for her back pain but the pain did not resolve so she presented to the ED. Patient also reports burning on urination for the last 10 days. Patient reports the pain is so severe that she is not able to move at all or even lift her legs to ambulate. Patient denies urinary or stool incontinence, saddle numbness or leg numbness. She denies leg numbness or weakness and reports the difficulty ambulation is strictly due to pain. Patient endorses constipation but denies other systemic symptoms including headahce, fever, chills, nausea, vomiting, abdominal pain, arm weakness/numbness, chest pain, palpitation. Patient denies any trauma to the back, reports no out of ordinary exercise at physical therapy last Monday. Patient reports only the morphine helped in the ED.     In ED   T 97.5F  HR 66, /78   RR 18, O2 95% in RA  (31 Jul 2024 11:26)    Pt is admitted for intractable back pain with ambulatory dysfunction. Pain service consulted on 7/31 for management of back pain. Per patient, she has history of chronic back pain x several years. Five days ago patient reports feeling a sharp pain down her thoracic and lumbar spine which has gotten progressively worse, she denied injury or bowel/bladder dysfunction.   Pt seen and examined at bedside, this morning in the presence of her family. At time of assessment, patient laying in bed with lumbar back brace on, A&Ox4 in NAD, reports pain score 5/10 while laying down and improved, patient encouraged to continue physical activity as tolerated SCALE USED: (1-10 VNRS).  Pt describes pain as localized to thoracic and lumbar spine with radiation down bilateral lower extremities, constant, sharp, shooting, and cramping in quality. The pain is currently alleviated by pain regimen and is exacerbated by movement. Pt tolerating PO diet. Denies lethargy, chest pain, SOB, nausea, vomiting, constipation. She endorses dysuria, and reports history of frequent UTI. Patient endorses constipation, last BM 7/30. Patient stated goal for pain control: to be able to take deep breaths, get out of bed to chair and ambulate with tolerable pain control. Pt ambulates with a straight cane at baseline and takes Gabapentin, Baclofen, Ibuprofen, and Tramadol at home for pain. She follows Dr. Elizondo as outpatient for pain management. Patient was able to ambulate with PT today.     PAST MEDICAL & SURGICAL HISTORY:  High cholesterol    DVT (deep venous thrombosis)    Vertigo    Urinary tract infection    Pyelitis    Asthma    Chronic back pain    No significant past surgical history    FAMILY HISTORY:    Social History:  Lives at home with  (31 Jul 2024 11:26)  [x] Denies ETOH use, illicit drug use and smoking    Allergies    No Known Allergies    Intolerances      MEDICATIONS  (STANDING):  DULoxetine 60 milliGRAM(s) Oral daily  enoxaparin Injectable 40 milliGRAM(s) SubCutaneous every 24 hours  gabapentin 400 milliGRAM(s) Oral every 8 hours  lidocaine   4% Patch 1 Patch Transdermal daily  methocarbamol 1000 milliGRAM(s) Oral every 8 hours  naloxone Injectable 0.4 milliGRAM(s) IV Push once  pantoprazole    Tablet 40 milliGRAM(s) Oral before breakfast  polyethylene glycol 3350 17 Gram(s) Oral daily  senna 2 Tablet(s) Oral at bedtime    MEDICATIONS  (PRN):  acetaminophen     Tablet .. 1000 milliGRAM(s) Oral every 6 hours PRN Moderate Pain (4 - 6)  bisacodyl 5 milliGRAM(s) Oral daily PRN Constipation  ondansetron Injectable 4 milliGRAM(s) IV Push every 4 hours PRN Nausea and/or Vomiting  traMADol 50 milliGRAM(s) Oral every 8 hours PRN Severe Pain (7 - 10)      Vital Signs Last 24 Hrs  T(C): 36.7 (02 Aug 2024 05:23), Max: 36.7 (01 Aug 2024 11:51)  T(F): 98 (02 Aug 2024 05:23), Max: 98 (01 Aug 2024 11:51)  HR: 65 (02 Aug 2024 05:23) (65 - 72)  BP: 128/60 (02 Aug 2024 05:23) (125/55 - 129/61)  BP(mean): --  RR: 17 (02 Aug 2024 05:23) (14 - 17)  SpO2: 94% (02 Aug 2024 05:23) (92% - 96%)    Parameters below as of 02 Aug 2024 05:23  Patient On (Oxygen Delivery Method): room air    LABS: Reviewed                          12.0   6.23  )-----------( 230      ( 02 Aug 2024 06:30 )             35.7     08-02    138  |  104  |  17  ----------------------------<  98  3.9   |  27  |  0.71    Ca    8.9      02 Aug 2024 06:30    TPro  7.2  /  Alb  3.7  /  TBili  0.3  /  DBili  x   /  AST  26  /  ALT  60  /  AlkPhos  60  08-02      LIVER FUNCTIONS - ( 02 Aug 2024 06:30 )  Alb: 3.7 g/dL / Pro: 7.2 g/dL / ALK PHOS: 60 U/L / ALT: 60 U/L DA / AST: 26 U/L / GGT: x           Urinalysis Basic - ( 02 Aug 2024 06:30 )    Color: x / Appearance: x / SG: x / pH: x  Gluc: 98 mg/dL / Ketone: x  / Bili: x / Urobili: x   Blood: x / Protein: x / Nitrite: x   Leuk Esterase: x / RBC: x / WBC x   Sq Epi: x / Non Sq Epi: x / Bacteria: x    CAPILLARY BLOOD GLUCOSE    Radiology: None to Review.     ORT Score -   Family Hx of substance abuse	Female	      Male  Alcohol 	                                           1                     3  Illegal drugs	                                   2                     3  Rx drugs                                           4 	                  4  Personal Hx of substance abuse		  Alcohol 	                                          3	                  3  Illegal drugs                                     4	                  4  Rx drugs                                            5 	                  5  Age between 16- 45 years	           1                     1  hx preadolescent sexual abuse	   3 	                  0  Psychological disease		  ADD, OCD, bipolar, schizophrenia   2	          2  Depression                                           1 	          1  Total: 0    a score of 3 or lower indicates low risk for opioid abuse		  a score of 4-7 indicates moderate risk for opioid abuse		  a score of 8 or higher indicates high risk for opioid abuse  	  REVIEW OF SYSTEMS:  CONSTITUTIONAL: No fever or fatigue  HEENT:  No difficulty hearing, no change in vision  NECK: No pain or stiffness  RESPIRATORY: No cough, wheezing, chills or hemoptysis; No shortness of breath  CARDIOVASCULAR: No chest pain, palpitations, dizziness, or leg swelling  GASTROINTESTINAL: No loss of appetite, decreased PO intake. No abdominal or epigastric pain. No nausea, vomiting; No diarrhea, + constipation.   GENITOURINARY: + dysuria, no frequency, hematuria, retention or incontinence  MUSCULOSKELETAL: + back pain, + bilateral lower extremity pain, + upper or lower motor strength weakness secondary to pain, no saddle anesthesia, bowel/bladder incontinence, no falls   NEURO: No headaches, + bilateral lower extremity pain   ENDOCRINE: No polyuria, polydipsia, heat or cold intolerance; No hair loss  PSYCHIATRIC: No depression, anxiety or difficulty sleeping    PHYSICAL EXAM:  GENERAL:  Alert & Oriented X4, cooperative, NAD, Good concentration. Speech is clear.   RESPIRATORY: Respirations even and unlabored. Clear to auscultation bilaterally  CARDIOVASCULAR: Normal S1/S2, regular rate and rhythm  GASTROINTESTINAL:  Soft, Nontender, Nondistended; Bowel sounds present  PERIPHERAL VASCULAR:  Extremities warm without edema. 2+ Peripheral Pulses, No cyanosis, No calf tenderness  MUSCULOSKELETAL: Motor Strength 4/5 B/L upper and lower extremities; decreased bilateral lower extremity ROM due to pain; unable to SLR bilaterally; + thoracic and lumbar paraspinal tenderness  SKIN: Warm, dry, intact. No rashes, lesions, scars or wounds.     Risk factors associated with adverse outcomes related to opioid treatment  [ ] Concurrent benzodiazepine use  [ ] History/ Active substance use or alcohol use disorder  [ ] Psychiatric co-morbidity  [ ] Sleep apnea  [ ] COPD  [ ] BMI> 35  [ ] Liver dysfunction  [ ] Renal dysfunction  [ ] CHF  [ ] Smoker  [ ] Age > 60 years    [x]  [place]  Reviewed and Copied to Chart. See below.    Plan of care and goal oriented pain management treatment options were discussed with patient and /or primary care giver; all questions and concerns were addressed and care was aligned with patient's wishes.    Educated patient on goal oriented pain management treatment options     
LAYNE BONILLA  58y  Female      Patient is a 58y old  Female who presents with a chief complaint of Intractable back pain (02 Aug 2024 10:06)      INTERVAL HPI/OVERNIGHT EVENTS:        REVIEW OF SYSTEMS:  CONSTITUTIONAL: No fever, weight loss, or fatigue  EYES: No eye pain, visual disturbances, or discharge  ENMT:   No sinus or throat pain  NECK: No pain or stiffness  BREASTS: No pain, masses, or nipple discharge  RESPIRATORY: No cough, wheezing, chills or hemoptysis; No shortness of breath  CARDIOVASCULAR: No chest pain, palpitations, dizziness, or leg swelling  GASTROINTESTINAL: No abdominal or epigastric pain. No nausea, vomiting, or hematemesis; No diarrhea or constipation. No melena or hematochezia.  GENITOURINARY: No hematuria  NEUROLOGICAL: demented    SKIN: No itching, burning, rashes, or lesions   LYMPH NODES: No enlarged glands  ENDOCRINE: No heat or cold intolerance; No hair loss  MUSCULOSKELETAL: back pain    HEME/LYMPH: No easy bruising, or bleeding gums  ALLERY AND IMMUNOLOGIC: No hives or eczema    T(C): 37.2 (08-02-24 @ 12:29), Max: 37.2 (08-02-24 @ 12:29)  HR: 69 (08-02-24 @ 12:29) (65 - 75)  BP: 132/65 (08-02-24 @ 12:29) (125/55 - 132/65)  RR: 15 (08-02-24 @ 12:29) (15 - 17)  SpO2: 97% (08-02-24 @ 12:29) (92% - 98%)  Wt(kg): --Vital Signs Last 24 Hrs  T(C): 37.2 (02 Aug 2024 12:29), Max: 37.2 (02 Aug 2024 12:29)  T(F): 98.9 (02 Aug 2024 12:29), Max: 98.9 (02 Aug 2024 12:29)  HR: 69 (02 Aug 2024 12:29) (65 - 75)  BP: 132/65 (02 Aug 2024 12:29) (125/55 - 132/65)  BP(mean): --  RR: 15 (02 Aug 2024 12:29) (15 - 17)  SpO2: 97% (02 Aug 2024 12:29) (92% - 98%)    Parameters below as of 02 Aug 2024 12:29  Patient On (Oxygen Delivery Method): room air        PHYSICAL EXAM:  GENERAL: NAD, well-groomed, well-developed  HEAD:  Atraumatic, Normocephalic  EYES: EOMI, PERRLA, conjunctiva and sclera clear  ENMT: No tonsillar erythema, exudates, or enlargement; Moist mucous membranes, Good dentition, No lesions  NECK: Supple, No JVD, Normal thyroid  NERVOUS SYSTEM:  demented.no focal neurodeficit.  CHEST/LUNG: Clear to percussion bilaterally; No rales, rhonchi, wheezing, or rubs  HEART: Regular rate and rhythm; No murmurs, rubs, or gallops  ABDOMEN: Soft, Nontender, Nondistended; Bowel sounds present  EXTREMITIES:  2+ Peripheral Pulses, No clubbing, cyanosis.  LYMPH: No lymphadenopathy noted  SKIN: No rashes or lesions    Consultant(s) Notes Reviewed:  [x ] YES  [ ] NO  Care Discussed with Consultants/Other Providers [ x] YES  [ ] NO    LABS:                        12.0   6.23  )-----------( 230      ( 02 Aug 2024 06:30 )             35.7     08-02    138  |  104  |  17  ----------------------------<  98  3.9   |  27  |  0.71    Ca    8.9      02 Aug 2024 06:30    TPro  7.2  /  Alb  3.7  /  TBili  0.3  /  DBili  x   /  AST  26  /  ALT  60  /  AlkPhos  60  08-02      Urinalysis Basic - ( 02 Aug 2024 06:30 )    Color: x / Appearance: x / SG: x / pH: x  Gluc: 98 mg/dL / Ketone: x  / Bili: x / Urobili: x   Blood: x / Protein: x / Nitrite: x   Leuk Esterase: x / RBC: x / WBC x   Sq Epi: x / Non Sq Epi: x / Bacteria: x      CAPILLARY BLOOD GLUCOSE            Urinalysis Basic - ( 02 Aug 2024 06:30 )    Color: x / Appearance: x / SG: x / pH: x  Gluc: 98 mg/dL / Ketone: x  / Bili: x / Urobili: x   Blood: x / Protein: x / Nitrite: x   Leuk Esterase: x / RBC: x / WBC x   Sq Epi: x / Non Sq Epi: x / Bacteria: x        RADIOLOGY & ADDITIONAL TESTS:    Imaging Personally Reviewed:  [ ] YES  [ ] NO
Source of information: LAYNE BONILLA, Chart review  Patient language: English  : n/a    HPI:  Patient is a 58F, from home ambulates with cane, PMHx of chronic back pain, GERD, pre-DM and frequent UTI, who presents with 5 day history of intractable back pain. Patient woke up on Friday morning with mid lower back pain that radiated down to the leg and frontal ingunial area. She took tramadol 50mg BID, ibuprofen 800mg qd, baclofen 10mg for her back pain but the pain did not resolve so she presented to the ED. Patient also reports burning on urination for the last 10 days. Patient reports the pain is so severe that she is not able to move at all or even lift her legs to ambulate. Patient denies urinary or stool incontinence, saddle numbness or leg numbness. She denies leg numbness or weakness and reports the difficulty ambulation is strictly due to pain. Patient endorses constipation but denies other systemic symptoms including headahce, fever, chills, nausea, vomiting, abdominal pain, arm weakness/numbness, chest pain, palpitation. Patient denies any trauma to the back, reports no out of ordinary exercise at physical therapy last Monday. Patient reports only the morphine helped in the ED.     In ED   T 97.5F  HR 66, /78   RR 18, O2 95% in RA  (31 Jul 2024 11:26)    Pt is admitted for intractable back pain with ambulatory dysfunction. Pain service consulted on 7/31 for management of back pain. Per patient, she has history of chronic back pain x several years. Five days ago patient reports feeling a sharp pain down her thoracic and lumbar spine which has gotten progressively worse, she denied injury or bowel/bladder dysfunction.   Pt seen and examined at bedside, this morning in the presence of her family. At time of assessment, patient laying in bed with lumbar back brace on, A&Ox4 in NAD, reports pain score 6/10 while laying down and improved since yesterday SCALE USED: (1-10 VNRS).  Pt describes pain as localized to thoracic and lumbar spine with radiation down bilateral lower extremities, constant, sharp, shooting, and cramping in quality. The pain is currently alleviated by pain regimen and is exacerbated by movement. Pt tolerating PO diet. Denies lethargy, chest pain, SOB, nausea, vomiting, constipation. She endorses dysuria, and reports history of frequent UTI. Reports last BM 7/30. Patient stated goal for pain control: to be able to take deep breaths, get out of bed to chair and ambulate with tolerable pain control. Pt ambulates with a straight cane at baseline and takes Gabapentin, Baclofen, Ibuprofen, and Tramadol at home for pain. She follows Dr. Elizondo as outpatient for pain management. Patient was able to ambulate with PT today.     PAST MEDICAL & SURGICAL HISTORY:  High cholesterol    DVT (deep venous thrombosis)    Vertigo    Urinary tract infection    Pyelitis    Asthma    Chronic back pain    No significant past surgical history    FAMILY HISTORY:    Social History:  Lives at home with  (31 Jul 2024 11:26)  [x] Denies ETOH use, illicit drug use and smoking    Allergies    No Known Allergies    Intolerances    MEDICATIONS  (STANDING):  acetaminophen   IVPB .. 1000 milliGRAM(s) IV Intermittent once  DULoxetine 60 milliGRAM(s) Oral daily  enoxaparin Injectable 40 milliGRAM(s) SubCutaneous every 24 hours  gabapentin 400 milliGRAM(s) Oral every 8 hours  ketorolac   Injectable 30 milliGRAM(s) IV Push every 8 hours  lidocaine   4% Patch 1 Patch Transdermal daily  methocarbamol 1000 milliGRAM(s) Oral every 8 hours  naloxone Injectable 0.4 milliGRAM(s) IV Push once  pantoprazole    Tablet 40 milliGRAM(s) Oral before breakfast  polyethylene glycol 3350 17 Gram(s) Oral daily  senna 2 Tablet(s) Oral at bedtime    MEDICATIONS  (PRN):  bisacodyl 5 milliGRAM(s) Oral daily PRN Constipation  ondansetron Injectable 4 milliGRAM(s) IV Push every 4 hours PRN Nausea and/or Vomiting  traMADol 50 milliGRAM(s) Oral every 8 hours PRN Severe Pain (7 - 10)    Vital Signs Last 24 Hrs  T(C): 36.7 (01 Aug 2024 05:22), Max: 36.7 (31 Jul 2024 12:23)  T(F): 98.1 (01 Aug 2024 05:22), Max: 98.1 (01 Aug 2024 05:22)  HR: 76 (01 Aug 2024 05:22) (63 - 77)  BP: 119/67 (01 Aug 2024 05:22) (119/67 - 141/81)  BP(mean): --  RR: 18 (01 Aug 2024 05:22) (14 - 18)  SpO2: 94% (01 Aug 2024 05:22) (92% - 96%)    Parameters below as of 01 Aug 2024 05:22  Patient On (Oxygen Delivery Method): room air    LABS: Reviewed                          11.9   7.17  )-----------( 211      ( 01 Aug 2024 06:23 )             34.5     08-01    137  |  105  |  19<H>  ----------------------------<  105<H>  4.4   |  26  |  0.58    Ca    8.9      01 Aug 2024 06:23    TPro  7.0  /  Alb  3.7  /  TBili  0.5  /  DBili  x   /  AST  29  /  ALT  62<H>  /  AlkPhos  57  08-01    LIVER FUNCTIONS - ( 01 Aug 2024 06:23 )  Alb: 3.7 g/dL / Pro: 7.0 g/dL / ALK PHOS: 57 U/L / ALT: 62 U/L DA / AST: 29 U/L / GGT: x           Urinalysis Basic - ( 01 Aug 2024 06:23 )    Color: x / Appearance: x / SG: x / pH: x  Gluc: 105 mg/dL / Ketone: x  / Bili: x / Urobili: x   Blood: x / Protein: x / Nitrite: x   Leuk Esterase: x / RBC: x / WBC x   Sq Epi: x / Non Sq Epi: x / Bacteria: x    CAPILLARY BLOOD GLUCOSE    Radiology: None to Review.     ORT Score -   Family Hx of substance abuse	Female	      Male  Alcohol 	                                           1                     3  Illegal drugs	                                   2                     3  Rx drugs                                           4 	                  4  Personal Hx of substance abuse		  Alcohol 	                                          3	                  3  Illegal drugs                                     4	                  4  Rx drugs                                            5 	                  5  Age between 16- 45 years	           1                     1  hx preadolescent sexual abuse	   3 	                  0  Psychological disease		  ADD, OCD, bipolar, schizophrenia   2	          2  Depression                                           1 	          1  Total: 0    a score of 3 or lower indicates low risk for opioid abuse		  a score of 4-7 indicates moderate risk for opioid abuse		  a score of 8 or higher indicates high risk for opioid abuse  	  REVIEW OF SYSTEMS:  CONSTITUTIONAL: No fever or fatigue  HEENT:  No difficulty hearing, no change in vision  NECK: No pain or stiffness  RESPIRATORY: No cough, wheezing, chills or hemoptysis; No shortness of breath  CARDIOVASCULAR: No chest pain, palpitations, dizziness, or leg swelling  GASTROINTESTINAL: No loss of appetite, decreased PO intake. No abdominal or epigastric pain. No nausea, vomiting; No diarrhea or constipation.   GENITOURINARY: + dysuria, no frequency, hematuria, retention or incontinence  MUSCULOSKELETAL: + back pain, + bilateral lower extremity pain, + upper or lower motor strength weakness secondary to pain, no saddle anesthesia, bowel/bladder incontinence, no falls   NEURO: No headaches, + bilateral lower extremity pain   ENDOCRINE: No polyuria, polydipsia, heat or cold intolerance; No hair loss  PSYCHIATRIC: No depression, anxiety or difficulty sleeping    PHYSICAL EXAM:  GENERAL:  Alert & Oriented X4, cooperative, NAD, Good concentration. Speech is clear.   RESPIRATORY: Respirations even and unlabored. Clear to auscultation bilaterally  CARDIOVASCULAR: Normal S1/S2, regular rate and rhythm  GASTROINTESTINAL:  Soft, Nontender, Nondistended; Bowel sounds present  PERIPHERAL VASCULAR:  Extremities warm without edema. 2+ Peripheral Pulses, No cyanosis, No calf tenderness  MUSCULOSKELETAL: Motor Strength 4/5 B/L upper and lower extremities; decreased bilateral lower extremity ROM due to pain; unable to SLR bilaterally; + thoracic and lumbar paraspinal tenderness  SKIN: Warm, dry, intact. No rashes, lesions, scars or wounds.     Risk factors associated with adverse outcomes related to opioid treatment  [ ] Concurrent benzodiazepine use  [ ] History/ Active substance use or alcohol use disorder  [ ] Psychiatric co-morbidity  [ ] Sleep apnea  [ ] COPD  [ ] BMI> 35  [ ] Liver dysfunction  [ ] Renal dysfunction  [ ] CHF  [ ] Smoker  [ ] Age > 60 years    [x]  [place]  Reviewed and Copied to Chart. See below.    Plan of care and goal oriented pain management treatment options were discussed with patient and /or primary care giver; all questions and concerns were addressed and care was aligned with patient's wishes.    Educated patient on goal oriented pain management treatment options

## 2024-08-02 NOTE — PROGRESS NOTE ADULT - PROBLEM SELECTOR PLAN 1
Pt with acute back pain with radiculopathy which is somatic and neuropathic in nature likely due to sciatica. Patient has history of chronic back pain and takes Tramadol 50mg q 12hrs at home, verified on iSTOP, Gabapentin, Baclofen,  and Ibuprofen for pain. She follows Dr. Elizondo as outpatient for pain management. Urology consulted for ongoing dysuria.  Opioid pain recommendations   - Continue Tramadol 50mg q 8hrs PRN for severe pain. Monitor for sedation/ respiratory depression. (patient takes Tramadol at home)  Non-opioid pain recommendations   - Completed Toradol 30 mg IVP q 8 hours x 2 days. Start Naproxen 375mg PO BID x 3 days. Monitor renal function  - Continue Robaxin to 1000 mg PO q 8hrs x 5 days. Monitor for sedation.   - Completed Ofirmev 1g q 6hrs x 4 doses. Then start Acetaminophen 1 gram PO q 6hrs PRN moderate pain. Monitor LFTs  - Continue Gabapentin to 400mg po q 8 hours. Monitor renal function.   - Continue Lidoderm 4% patch daily. (12 hrs on/12 hrs off)  Bowel Regimen  - Continue Miralax 17G PO daily  - Continue Senna 2 tablets at bedtime for constipation  Mild pain (score 1-3)  - Non-pharmacological pain treatment recommendations  - Warm/ Cool packs PRN   - Repositioning extremity, elevation, imagery, relaxation, distraction.  - Physical therapy OOB if no contraindications   Recommendations discussed with primary team and RN.    *** Upon discharge can prescribe 1 week supply of Robaxin 1000mg q 8hrs PRN for muscle spasms, Naproxen 375 BID x 5 days with PPI, and lidocaine patches ***

## 2024-08-02 NOTE — PROGRESS NOTE ADULT - PROVIDER SPECIALTY LIST ADULT
Internal Medicine
Internal Medicine
Urology
Internal Medicine
Pain Medicine
Pain Medicine
Internal Medicine
Internal Medicine

## 2024-08-02 NOTE — PROGRESS NOTE ADULT - ASSESSMENT
Patient is a 58F, from home ambulates with cane, PMHx of chronic back pain, GERD, pre-DM and frequent UTI, Presented to ED with 5 day history of intractable back pain. Neuro exam nonfocal, no evidence of cauda equina syndrome or cord compression.   Admitted for intractable back pain.   Out pt MRI from [facility] shows degenerative changes of Lumbar spine, mild canal and b/l neural foraminal stenosis with disc abutting the traversing L5 nerve roots, L4-5 disc bulge.    Neuro consulted for Sciatica   Urology consulted for Urinary hesitancy and pelvic pain. Pending CT A/P   PT-Home pt with rolling walker  Patient is a 58F, from home ambulates with cane, PMHx of chronic back pain, GERD, pre-DM and frequent UTI, Presented to ED with 5 day history of intractable back pain. Neuro exam nonfocal, no evidence of cauda equina syndrome or cord compression.   Admitted for intractable back pain.   Out pt MRI from [facility] shows degenerative changes of Lumbar spine, mild canal and b/l neural foraminal stenosis with disc abutting the traversing L5 nerve roots, L4-5 disc bulge.    Neuro consulted for Sciatica   Urology consulted for Urinary hesitancy and pelvic pain.   CT A/P  No evidence for a ureteral calculus. No hydronephrosis or pyelonephritis.  PT-Home pt with rolling walker

## 2024-08-02 NOTE — PROGRESS NOTE ADULT - PROBLEM SELECTOR PLAN 1
p/w intractable back pain  Neuro exam non-focal   Chronic back pain follows pain management   On duloxetin, gabapentin, zolpidem per Surescript/claimed but patient did not mention   s/p toradol, morphine, IV tylenol in ED   c/w home duloxetin, gabapentin  Will do 1000mg standing tylenol q8h +morphine 2mg for moderate 4mg for severe  f/u thoracic, lumbar spine xray  Pain management consulted  PT Home PT p/w intractable back pain  Neuro exam non-focal   Chronic back pain follows pain management   On duloxetin, gabapentin, zolpidem per Surescript/claimed but patient did not mention   s/p toradol, morphine, IV tylenol in ED   c/w home duloxetin, gabapentin  Will do 1000mg standing tylenol q8h +morphine 2mg for moderate 4mg for severe  Old MRI spine from [facility] in chart   Pain management consulted  PT Home PT

## 2024-09-16 ENCOUNTER — EMERGENCY (EMERGENCY)
Facility: HOSPITAL | Age: 59
LOS: 1 days | Discharge: HOME | End: 2024-09-16
Attending: EMERGENCY MEDICINE
Payer: MEDICARE

## 2024-09-16 ENCOUNTER — NON-APPOINTMENT (OUTPATIENT)
Age: 59
End: 2024-09-16

## 2024-09-16 VITALS
HEART RATE: 76 BPM | OXYGEN SATURATION: 96 % | WEIGHT: 188.94 LBS | RESPIRATION RATE: 18 BRPM | SYSTOLIC BLOOD PRESSURE: 140 MMHG | HEIGHT: 67 IN | DIASTOLIC BLOOD PRESSURE: 87 MMHG | TEMPERATURE: 98 F

## 2024-09-16 VITALS
RESPIRATION RATE: 16 BRPM | HEART RATE: 70 BPM | DIASTOLIC BLOOD PRESSURE: 76 MMHG | SYSTOLIC BLOOD PRESSURE: 132 MMHG | OXYGEN SATURATION: 99 % | TEMPERATURE: 99 F

## 2024-09-16 LAB
ALBUMIN SERPL ELPH-MCNC: 4.1 G/DL — SIGNIFICANT CHANGE UP (ref 3.5–5)
ALP SERPL-CCNC: 90 U/L — SIGNIFICANT CHANGE UP (ref 40–120)
ALT FLD-CCNC: 126 U/L DA — HIGH (ref 10–60)
ANION GAP SERPL CALC-SCNC: 5 MMOL/L — SIGNIFICANT CHANGE UP (ref 5–17)
APPEARANCE UR: ABNORMAL
AST SERPL-CCNC: 63 U/L — HIGH (ref 10–40)
BACTERIA # UR AUTO: ABNORMAL /HPF
BASOPHILS # BLD AUTO: 0.03 K/UL — SIGNIFICANT CHANGE UP (ref 0–0.2)
BASOPHILS NFR BLD AUTO: 0.3 % — SIGNIFICANT CHANGE UP (ref 0–2)
BILIRUB SERPL-MCNC: 0.5 MG/DL — SIGNIFICANT CHANGE UP (ref 0.2–1.2)
BILIRUB UR-MCNC: NEGATIVE — SIGNIFICANT CHANGE UP
BUN SERPL-MCNC: 15 MG/DL — SIGNIFICANT CHANGE UP (ref 7–18)
CALCIUM SERPL-MCNC: 9.2 MG/DL — SIGNIFICANT CHANGE UP (ref 8.4–10.5)
CHLORIDE SERPL-SCNC: 107 MMOL/L — SIGNIFICANT CHANGE UP (ref 96–108)
CO2 SERPL-SCNC: 28 MMOL/L — SIGNIFICANT CHANGE UP (ref 22–31)
COLOR SPEC: ABNORMAL
CREAT SERPL-MCNC: 0.63 MG/DL — SIGNIFICANT CHANGE UP (ref 0.5–1.3)
DIFF PNL FLD: ABNORMAL
EGFR: 103 ML/MIN/1.73M2 — SIGNIFICANT CHANGE UP
EOSINOPHIL # BLD AUTO: 0.16 K/UL — SIGNIFICANT CHANGE UP (ref 0–0.5)
EOSINOPHIL NFR BLD AUTO: 1.5 % — SIGNIFICANT CHANGE UP (ref 0–6)
EPI CELLS # UR: PRESENT
GLUCOSE SERPL-MCNC: 112 MG/DL — HIGH (ref 70–99)
GLUCOSE UR QL: NEGATIVE MG/DL — SIGNIFICANT CHANGE UP
HCT VFR BLD CALC: 35.8 % — SIGNIFICANT CHANGE UP (ref 34.5–45)
HGB BLD-MCNC: 12.1 G/DL — SIGNIFICANT CHANGE UP (ref 11.5–15.5)
IMM GRANULOCYTES NFR BLD AUTO: 0.3 % — SIGNIFICANT CHANGE UP (ref 0–0.9)
KETONES UR-MCNC: NEGATIVE MG/DL — SIGNIFICANT CHANGE UP
LEUKOCYTE ESTERASE UR-ACNC: ABNORMAL
LIDOCAIN IGE QN: 17 U/L — SIGNIFICANT CHANGE UP (ref 13–75)
LYMPHOCYTES # BLD AUTO: 1.63 K/UL — SIGNIFICANT CHANGE UP (ref 1–3.3)
LYMPHOCYTES # BLD AUTO: 14.9 % — SIGNIFICANT CHANGE UP (ref 13–44)
MCHC RBC-ENTMCNC: 30.4 PG — SIGNIFICANT CHANGE UP (ref 27–34)
MCHC RBC-ENTMCNC: 33.8 GM/DL — SIGNIFICANT CHANGE UP (ref 32–36)
MCV RBC AUTO: 89.9 FL — SIGNIFICANT CHANGE UP (ref 80–100)
MONOCYTES # BLD AUTO: 0.73 K/UL — SIGNIFICANT CHANGE UP (ref 0–0.9)
MONOCYTES NFR BLD AUTO: 6.7 % — SIGNIFICANT CHANGE UP (ref 2–14)
NEUTROPHILS # BLD AUTO: 8.37 K/UL — HIGH (ref 1.8–7.4)
NEUTROPHILS NFR BLD AUTO: 76.3 % — SIGNIFICANT CHANGE UP (ref 43–77)
NITRITE UR-MCNC: POSITIVE
NRBC # BLD: 0 /100 WBCS — SIGNIFICANT CHANGE UP (ref 0–0)
PH UR: 5 — SIGNIFICANT CHANGE UP (ref 5–8)
PLATELET # BLD AUTO: 206 K/UL — SIGNIFICANT CHANGE UP (ref 150–400)
POTASSIUM SERPL-MCNC: 4 MMOL/L — SIGNIFICANT CHANGE UP (ref 3.5–5.3)
POTASSIUM SERPL-SCNC: 4 MMOL/L — SIGNIFICANT CHANGE UP (ref 3.5–5.3)
PROT SERPL-MCNC: 7.6 G/DL — SIGNIFICANT CHANGE UP (ref 6–8.3)
PROT UR-MCNC: 30 MG/DL
RBC # BLD: 3.98 M/UL — SIGNIFICANT CHANGE UP (ref 3.8–5.2)
RBC # FLD: 12.7 % — SIGNIFICANT CHANGE UP (ref 10.3–14.5)
RBC CASTS # UR COMP ASSIST: ABNORMAL /HPF
SODIUM SERPL-SCNC: 140 MMOL/L — SIGNIFICANT CHANGE UP (ref 135–145)
SP GR SPEC: 1.01 — SIGNIFICANT CHANGE UP (ref 1–1.03)
UROBILINOGEN FLD QL: 1 MG/DL — SIGNIFICANT CHANGE UP (ref 0.2–1)
WBC # BLD: 10.95 K/UL — HIGH (ref 3.8–10.5)
WBC # FLD AUTO: 10.95 K/UL — HIGH (ref 3.8–10.5)
WBC UR QL: ABNORMAL /HPF (ref 0–5)

## 2024-09-16 PROCEDURE — 87186 SC STD MICRODIL/AGAR DIL: CPT

## 2024-09-16 PROCEDURE — 80053 COMPREHEN METABOLIC PANEL: CPT

## 2024-09-16 PROCEDURE — 99284 EMERGENCY DEPT VISIT MOD MDM: CPT | Mod: 25

## 2024-09-16 PROCEDURE — 96375 TX/PRO/DX INJ NEW DRUG ADDON: CPT

## 2024-09-16 PROCEDURE — 36415 COLL VENOUS BLD VENIPUNCTURE: CPT

## 2024-09-16 PROCEDURE — 96374 THER/PROPH/DIAG INJ IV PUSH: CPT

## 2024-09-16 PROCEDURE — 74176 CT ABD & PELVIS W/O CONTRAST: CPT | Mod: MC

## 2024-09-16 PROCEDURE — 87086 URINE CULTURE/COLONY COUNT: CPT

## 2024-09-16 PROCEDURE — 83690 ASSAY OF LIPASE: CPT

## 2024-09-16 PROCEDURE — 74176 CT ABD & PELVIS W/O CONTRAST: CPT | Mod: 26,MC

## 2024-09-16 PROCEDURE — 81001 URINALYSIS AUTO W/SCOPE: CPT

## 2024-09-16 PROCEDURE — 85025 COMPLETE CBC W/AUTO DIFF WBC: CPT

## 2024-09-16 PROCEDURE — 99284 EMERGENCY DEPT VISIT MOD MDM: CPT

## 2024-09-16 RX ORDER — CEPHALEXIN 500 MG
1 CAPSULE ORAL
Qty: 28 | Refills: 0
Start: 2024-09-16 | End: 2024-09-22

## 2024-09-16 RX ORDER — KETOROLAC TROMETHAMINE 30 MG/ML
15 INJECTION, SOLUTION INTRAMUSCULAR ONCE
Refills: 0 | Status: DISCONTINUED | OUTPATIENT
Start: 2024-09-16 | End: 2024-09-16

## 2024-09-16 RX ORDER — IBUPROFEN 600 MG
1 TABLET ORAL
Qty: 21 | Refills: 0
Start: 2024-09-16 | End: 2024-09-22

## 2024-09-16 RX ORDER — ONDANSETRON 2 MG/ML
4 INJECTION, SOLUTION INTRAMUSCULAR; INTRAVENOUS ONCE
Refills: 0 | Status: COMPLETED | OUTPATIENT
Start: 2024-09-16 | End: 2024-09-16

## 2024-09-16 RX ADMIN — Medication 1000 MILLIGRAM(S): at 12:10

## 2024-09-16 RX ADMIN — ONDANSETRON 4 MILLIGRAM(S): 2 INJECTION, SOLUTION INTRAMUSCULAR; INTRAVENOUS at 10:45

## 2024-09-16 RX ADMIN — KETOROLAC TROMETHAMINE 15 MILLIGRAM(S): 30 INJECTION, SOLUTION INTRAMUSCULAR at 11:14

## 2024-09-16 RX ADMIN — Medication 100 MILLIGRAM(S): at 11:40

## 2024-09-16 RX ADMIN — KETOROLAC TROMETHAMINE 15 MILLIGRAM(S): 30 INJECTION, SOLUTION INTRAMUSCULAR at 10:44

## 2024-09-16 NOTE — ED ADULT NURSE NOTE - OBJECTIVE STATEMENT
59 yo female lying on the bed c/o pain on the suprapubic area associated with painful urination, noted blood in the urine, and vomiting that started 4 days ago.

## 2024-09-16 NOTE — ED ADULT TRIAGE NOTE - CHIEF COMPLAINT QUOTE
PT REPORTS LOWER ABDOMINAL PAIN RADIATING TO BILATERAL FLANK X 5 DAYS. + HEMATURIA, DYSURIA, NAUSEA, CHILLS

## 2024-09-16 NOTE — ED PROVIDER NOTE - NSFOLLOWUPINSTRUCTIONS_ED_ALL_ED_FT
English    Pyelonephritis, Adult  Body outline showing the urinary system, with a close-up of a normal kidney and an infected kidney.  Pyelonephritis is an infection in the kidney. The kidneys are the parts of the body that help clean the blood. They move waste out of the blood and into the pee (urine).    This infection can happen fast, or it can last for a long time. In most cases, it clears up with treatment and does not cause other problems.    What are the causes?  This infection may be caused by germs (bacteria). The germs may go:  From your bladder up into your kidney. This may happen after you have a bladder infection.  From your blood into your kidney.  What increases the risk?  You are more likely to get this condition if:  You are pregnant.  You are older.  You have:  Diabetes.  Prostatitis. This is irritation and swelling of the prostate gland.  Kidney stones or bladder stones.  Other problems with your kidney or the parts of your body that carry pee from the kidneys to the bladder (ureters).  Cancer.  You have a soft tube called a catheter in your bladder.  You are sexually active.  You use a medicine that kills sperm (spermicide). This may be used to prevent pregnancy.  You have had a urinary tract infection (UTI) before.  What are the signs or symptoms?  Peeing often.  Feeling the need to pee right away.  A burning or stinging feeling when you pee.  Pain in your belly, back, or side.  Fever or chills.  Vomiting or feeling like you may vomit.  Dark pee or blood in your pee.  How is this treated?  You may be given antibiotics to take.  You will need to drink lots of fluids.  If the infection is bad, you may need to stay in the hospital. You may be given antibiotics and fluids through an IV.  In some cases, other treatments may be needed.  Follow these instructions at home:  Eating and drinking    Drink enough fluid to keep your pee pale yellow.  Avoid caffeine, tea, and drinks that are bubbly (carbonated).  General instructions    Take over-the-counter and prescription medicines as told by your doctor. Finish your antibiotics even if you start to feel better.  Pee (urinate) often. Do not hold in your pee for long periods of time.  Pee before and after you have sex.  If you are female, wipe from front to back after you poop (have a bowel movement). Use each tissue only once.  Keep all follow-up visits. Your doctor will want to make sure that you are getting better.  Contact a doctor if:  You do not feel better after 2 days.  Your symptoms get worse.  You have a fever or chills.  You cannot take your medicine.  Get help right away if:  You vomit each time that you eat or drink.  You have very bad pain in your back or side.  You are very weak, or you faint.  This information is not intended to replace advice given to you by your health care provider. Make sure you discuss any questions you have with your health care provider.    Document Revised: 07/10/2023 Document Reviewed: 07/10/2023  [employer] Patient Education © 2024 [employer]  [employer] logo  Terms and Conditions  Privacy Policy  Editorial Policy  All content on this site: Copyright © 2024 Else[employer], its licensors, and contributors. All rights are reserved, including those for text and data mining, AI training, and similar technologies. For all open access content, the Creative Commons licensing terms apply.  Cookies are used by this site. To decline or learn more, visit our Cookies page.  [employer]

## 2024-09-16 NOTE — ED PROVIDER NOTE - PATIENT PORTAL LINK FT
You can access the FollowMyHealth Patient Portal offered by [facility] by registering at the following website: [email or web address]. By joining [employer]’s FollowMyHealth portal, you will also be able to view your health information using other applications (apps) compatible with our system.

## 2024-09-16 NOTE — ED ADULT NURSE NOTE - NSFALLUNIVINTERV_ED_ALL_ED
Bed/Stretcher in lowest position, wheels locked, appropriate side rails in place/Call bell, personal items and telephone in reach/Instruct patient to call for assistance before getting out of bed/chair/stretcher/Non-slip footwear applied when patient is off stretcher/[place] to call system/Physically safe environment - no spills, clutter or unnecessary equipment/Purposeful proactive rounding/Room/bathroom lighting operational, light cord in reach

## 2024-09-16 NOTE — ED PROVIDER NOTE - CLINICAL SUMMARY MEDICAL DECISION MAKING FREE TEXT BOX
patient complaining of pelvic pain radiating to the bilateral flanks history of ovarian cystectomy.  History of recurrent UTIs we will get a UA urine CNS will obtain a CBC to check the white count chemistries to check kidney function we will do a renal CT scan patient will be given Toradol and Zofran for pain and nausea

## 2024-09-16 NOTE — ED ADULT TRIAGE NOTE - SPO2 (%)
Refill approved for 1 month.  Brandi Alia Dc needs to be seen for an appointment before further refills are given.    
96

## 2024-09-16 NOTE — ED PROVIDER NOTE - RESPIRATORY, MLM
Breath sounds clear and equal bilaterally..  Tenderness noted over the pelvic area with some guarding.  No flank tenderness

## 2024-09-16 NOTE — ED PROVIDER NOTE - OBJECTIVE STATEMENT
Patient is a 58-year-old with history of UTIs in the past today developed suprapubic pain radiating to bilateral flanks no fever no chills no nausea no vomiting no diarrhea patient has had recurrent E. coli infections

## 2024-09-17 ENCOUNTER — APPOINTMENT (OUTPATIENT)
Dept: RHEUMATOLOGY | Facility: CLINIC | Age: 59
End: 2024-09-17

## 2024-09-17 VITALS
RESPIRATION RATE: 16 BRPM | HEART RATE: 67 BPM | DIASTOLIC BLOOD PRESSURE: 78 MMHG | SYSTOLIC BLOOD PRESSURE: 126 MMHG | WEIGHT: 180 LBS | HEIGHT: 67 IN | TEMPERATURE: 97.9 F | BODY MASS INDEX: 28.25 KG/M2 | OXYGEN SATURATION: 97 %

## 2024-09-17 DIAGNOSIS — M17.12 UNILATERAL PRIMARY OSTEOARTHRITIS, LEFT KNEE: ICD-10-CM

## 2024-09-17 DIAGNOSIS — M25.542 PAIN IN JOINTS OF RIGHT HAND: ICD-10-CM

## 2024-09-17 DIAGNOSIS — M25.541 PAIN IN JOINTS OF RIGHT HAND: ICD-10-CM

## 2024-09-17 DIAGNOSIS — M51.36 OTHER INTERVERTEBRAL DISC DEGENERATION, LUMBAR REGION: ICD-10-CM

## 2024-09-17 DIAGNOSIS — E55.9 VITAMIN D DEFICIENCY, UNSPECIFIED: ICD-10-CM

## 2024-09-17 DIAGNOSIS — M70.52 OTHER BURSITIS OF KNEE, LEFT KNEE: ICD-10-CM

## 2024-09-17 DIAGNOSIS — M13.0 POLYARTHRITIS, UNSPECIFIED: ICD-10-CM

## 2024-09-17 PROCEDURE — 99205 OFFICE O/P NEW HI 60 MIN: CPT | Mod: 25

## 2024-09-17 PROCEDURE — 76881 US COMPL JOINT R-T W/IMG: CPT | Mod: LT

## 2024-09-17 RX ORDER — DICLOFENAC SODIUM 20 MG/G
2 SOLUTION TOPICAL
Qty: 1 | Refills: 0 | Status: ACTIVE | COMMUNITY
Start: 2024-09-17 | End: 1900-01-01

## 2024-09-19 LAB
-  AMOXICILLIN/CLAVULANIC ACID: SIGNIFICANT CHANGE UP
-  AMPICILLIN/SULBACTAM: SIGNIFICANT CHANGE UP
-  AMPICILLIN: SIGNIFICANT CHANGE UP
-  AZTREONAM: SIGNIFICANT CHANGE UP
-  CEFAZOLIN: SIGNIFICANT CHANGE UP
-  CEFEPIME: SIGNIFICANT CHANGE UP
-  CEFOXITIN: SIGNIFICANT CHANGE UP
-  CEFTRIAXONE: SIGNIFICANT CHANGE UP
-  CEFUROXIME: SIGNIFICANT CHANGE UP
-  CIPROFLOXACIN: SIGNIFICANT CHANGE UP
-  ERTAPENEM: SIGNIFICANT CHANGE UP
-  GENTAMICIN: SIGNIFICANT CHANGE UP
-  IMIPENEM: SIGNIFICANT CHANGE UP
-  LEVOFLOXACIN: SIGNIFICANT CHANGE UP
-  MEROPENEM: SIGNIFICANT CHANGE UP
-  NITROFURANTOIN: SIGNIFICANT CHANGE UP
-  PIPERACILLIN/TAZOBACTAM: SIGNIFICANT CHANGE UP
-  TOBRAMYCIN: SIGNIFICANT CHANGE UP
-  TRIMETHOPRIM/SULFAMETHOXAZOLE: SIGNIFICANT CHANGE UP
CULTURE RESULTS: ABNORMAL
METHOD TYPE: SIGNIFICANT CHANGE UP
ORGANISM # SPEC MICROSCOPIC CNT: ABNORMAL
ORGANISM # SPEC MICROSCOPIC CNT: ABNORMAL
SPECIMEN SOURCE: SIGNIFICANT CHANGE UP

## 2024-09-19 RX ORDER — CEFUROXIME SODIUM 1.5 G
1 VIAL (EA) INJECTION
Qty: 14 | Refills: 0
Start: 2024-09-19 | End: 2024-09-25

## 2024-09-19 NOTE — ED POST DISCHARGE NOTE - DETAILS
reviewed culture. although cephalexin should be sufficient coverage as urine sens are pan sensitive. changed to cefuroxime

## 2024-09-19 NOTE — ED POST DISCHARGE NOTE - RESULT SUMMARY
patient called because she wanted her culture results. also still having pain despite taking abx for 3 days

## 2024-09-20 PROBLEM — M70.52 INFRAPATELLAR BURSITIS OF LEFT KNEE: Status: ACTIVE | Noted: 2024-09-20

## 2024-09-26 NOTE — PROCEDURE
[Other Date:___] : Date: [unfilled] [Patient] : the patient [Risks] : risks [Benefits] : benefits [Diagnostic] : diagnostic  [#1 Site: ______] : #1 site identified in the [unfilled] [Tolerated Well] : the patient tolerated the procedure well [FreeTextEntry1] : Patient Name : Aide Britton YOB: 1965 Study Date: 9/17/2024 Study Type: Complete study  Indication: Pain in LT knee   Study Site: LT knee Equipment: Scifiniti e 12MHz linear transducer    Findings: Orthogonal views of the anterior, posterior, medial and lateral aspects of the knee were obtained in grey scale.  Trace hypoechoic pocket in the suprapatellar fossa in long and short axis with early cortical irregularities of hyperechoic linear structure,  marginal osteophytes and joint space narrowing of the medial joint line.  Thickening of the medial collateral ligament with slight abutting of the medial meniscus.  Patellar tendon with fine alternating parallel lines with speckled pattern on short axis.  Small hypoechoic pocket inferior to the distal patellar tendon and just proximal to the tibial tuberosity.  No muscle or tendon or tendon sheath abnormalities seen in the anterior or posterior compartments.  No fluid identified in popliteal fossa.   Impressions:   Small infrapatellar bursitis  Mild Degenerative Joint Disease.   No specific findings to suggest crystal related arthritis.  No CPD uptake  No joint effusion appreciated  Images uploaded onto [employer]

## 2024-09-26 NOTE — PROCEDURE
[Other Date:___] : Date: [unfilled] [Patient] : the patient [Risks] : risks [Benefits] : benefits [Diagnostic] : diagnostic  [#1 Site: ______] : #1 site identified in the [unfilled] [Tolerated Well] : the patient tolerated the procedure well [FreeTextEntry1] : Patient Name : Aide Britton YOB: 1965 Study Date: 9/17/2024 Study Type: Complete study  Indication: Pain in LT knee   Study Site: LT knee Equipment: [employer] e 12MHz linear transducer    Findings: Orthogonal views of the anterior, posterior, medial and lateral aspects of the knee were obtained in grey scale.  Trace hypoechoic pocket in the suprapatellar fossa in long and short axis with early cortical irregularities of hyperechoic linear structure,  marginal osteophytes and joint space narrowing of the medial joint line.  Thickening of the medial collateral ligament with slight abutting of the medial meniscus.  Patellar tendon with fine alternating parallel lines with speckled pattern on short axis.  Small hypoechoic pocket inferior to the distal patellar tendon and just proximal to the tibial tuberosity.  No muscle or tendon or tendon sheath abnormalities seen in the anterior or posterior compartments.  No fluid identified in popliteal fossa.   Impressions:   Small infrapatellar bursitis  Mild Degenerative Joint Disease.   No specific findings to suggest crystal related arthritis.  No CPD uptake  No joint effusion appreciated  Images uploaded onto [employer]

## 2024-09-26 NOTE — PROCEDURE
[Other Date:___] : Date: [unfilled] [Patient] : the patient [Risks] : risks [Benefits] : benefits [Diagnostic] : diagnostic  [#1 Site: ______] : #1 site identified in the [unfilled] [Tolerated Well] : the patient tolerated the procedure well [FreeTextEntry1] : Patient Name : Aide Britton YOB: 1965 Study Date: 9/17/2024 Study Type: Complete study  Indication: Pain in LT knee   Study Site: LT knee Equipment: [employer] e 12MHz linear transducer    Findings: Orthogonal views of the anterior, posterior, medial and lateral aspects of the knee were obtained in grey scale.  Trace hypoechoic pocket in the suprapatellar fossa in long and short axis with early cortical irregularities of hyperechoic linear structure,  marginal osteophytes and joint space narrowing of the medial joint line.  Thickening of the medial collateral ligament with slight abutting of the medial meniscus.  Patellar tendon with fine alternating parallel lines with speckled pattern on short axis.  Small hypoechoic pocket inferior to the distal patellar tendon and just proximal to the tibial tuberosity.  No muscle or tendon or tendon sheath abnormalities seen in the anterior or posterior compartments.  No fluid identified in popliteal fossa.   Impressions:   Small infrapatellar bursitis  Mild Degenerative Joint Disease.   No specific findings to suggest crystal related arthritis.  No CPD uptake  No joint effusion appreciated  Images uploaded onto [employer]

## 2024-09-26 NOTE — PHYSICAL EXAM
[General Appearance - Alert] : alert [General Appearance - In No Acute Distress] : in no acute distress [Sclera] : the sclera and conjunctiva were normal [Neck Appearance] : the appearance of the neck was normal [Auscultation Breath Sounds / Voice Sounds] : lungs were clear to auscultation bilaterally [Heart Sounds] : normal S1 and S2 [Edema] : there was no peripheral edema [No Spinal Tenderness] : no spinal tenderness [Nail Clubbing] : no clubbing  or cyanosis of the fingernails [Motor Tone] : muscle strength and tone were normal [] : no rash [Skin Lesions] : no skin lesions [Motor Exam] : the motor exam was normal [Impaired Insight] : insight and judgment were intact [FreeTextEntry1] : no active synovitis

## 2024-09-26 NOTE — HISTORY OF PRESENT ILLNESS
[FreeTextEntry1] : Patient presents with diffuse arthralgias stemming over the last seven years with notable pain over the lower extremities including the knees and lower back.  Patient recently evaluated by orthopedics for bilateral patellofemoral chondromalacia with recent plain films reflective of mild degenerative joint disease of the patellofemoral compartment of the knee with joint space narrowing, osteophyte formation, and subchondral sclerosis and was recommended conservative therapy.  Addressing back pain, recent MR imaging reflect L4-L5 disc bulging along with L5-S1 disc pathology; patient under evaluation by pain management and receives epidurals along with opioid therapy and muscle relaxants.   Patient reports fatigue and nonrestorative sleep.  She does describe increased stressors.  She notes DVT s/p surgical procedure while on birth control and was given Coumadin x 1 year.  She otherwise denies other thromboembolic events.   She additionally denies visual disturbances, oral ulcers, dysphagia, shortness of breath, dyspnea, motor disturbances or systemic symptoms.

## 2024-09-26 NOTE — REVIEW OF SYSTEMS
[Arthralgias] : arthralgias [Joint Stiffness] : joint stiffness [Difficulty Walking] : difficulty walking [Sleep Disturbances] : sleep disturbances [Feelings Of Weakness] : feelings of weakness [Fever] : no fever [Chills] : no chills [Eye Pain] : no eye pain [Red Eyes] : eyes not red [Sore Throat] : no sore throat [Hoarseness] : no hoarseness [Chest Pain] : no chest pain [Palpitations] : no palpitations [Cough] : no cough [SOB on Exertion] : no shortness of breath during exertion [Skin Lesions] : no skin lesions [Easy Bleeding] : no tendency for easy bleeding [Easy Bruising] : no tendency for easy bruising

## 2024-09-26 NOTE — HISTORY OF PRESENT ILLNESS
[FreeTextEntry1] : Patient presents with diffuse arthralgias stemming over the last seven years with notable pain over the lower extremities including the knees and lower back.  Patient recently evaluated by orthopedics for bilateral patellofemoral chondromalacia with recent plain films reflective of mild degenerative joint disease of the patellofemoral compartment of the knee with joint space narrowing, osteophyte formation, and subchondral sclerosis and was recommended conservative therapy.  Addressing back pain, recent MR imaging reflect L4-L5 disc bulging along with L5-S1 disc pathology; patient under evaluation by pain management and receives epidurals along with opioid therapy and muscle relaxants.   Patient reports fatigue and nonrestorative sleep.  She does describe increased stressors.  She notes DVT s/p surgical procedure while on birth control and was given Coumadin x 1 year.  She otherwise denies other thromboembolic events.   She additionally denies visual disturbances, oral ulcers, dysphagia, shortness of breath, dyspnea, motor disturbances or systemic symptoms.  Libtayo Counseling- I discussed with the patient the risks of Libtayo including but not limited to nausea, vomiting, diarrhea, and bone or muscle pain.  The patient verbalized understanding of the proper use and possible adverse effects of Libtayo.  All of the patient's questions and concerns were addressed.

## 2024-09-26 NOTE — ASSESSMENT
[FreeTextEntry1] : Patient with diffuse arthralgias; lumbar DJD;  LT knee arthropathy:  POCUS of the LT knee reflects DJD changes along with infrapatellar bursitis; knee sleeve rec and kneeling to be avoided. Patient will benefit from physical therapy to help with joint mobility and muscle strengthening; aquatherapy requisition given.  Quadriceps and core strengthening exercises emphasized.  Updated L spine and hip films rec.   Weight loss has been encouraged to reduce load over the medial joint line.  Viscosupplementation has been encouraged to provide additional lubrication and joint support.  In the interim, Diclofenac gel has been prescribed. Moreover, in the setting of long standing arthralgias,  patient to have inflammatory markers and serologies drawn to assess for underlying inflammatory process; hand films requested.  The importance of dietary and lifestyle modifications was reiterated for the treatment of Fibromyalgia along with discussions on relaxation, stress-reducing techniques and importance of good sleep hygiene.  She is in agreement with the above plan and will return in six weeks' time.

## 2024-09-26 NOTE — REASON FOR VISIT
[Initial Evaluation] : an initial evaluation [FreeTextEntry1] : Multiple arthralgias; lower back pain

## 2024-10-22 ENCOUNTER — LABORATORY RESULT (OUTPATIENT)
Age: 59
End: 2024-10-22

## 2024-10-24 ENCOUNTER — LABORATORY RESULT (OUTPATIENT)
Age: 59
End: 2024-10-24

## 2024-10-24 LAB — GGT SERPL-CCNC: 166 U/L

## 2024-10-25 LAB — SMOOTH MUSCLE AB SER QL IF: NORMAL

## 2024-10-28 LAB — LKM AB SER QL IF: <20.1 UNITS

## 2024-10-29 LAB
MISCELLANEOUS TEST: NORMAL
PROC NAME: NORMAL
SOLUBLE LIVER IGG SER IA-ACNC: 1

## 2024-10-31 ENCOUNTER — APPOINTMENT (OUTPATIENT)
Dept: RADIOLOGY | Facility: CLINIC | Age: 59
End: 2024-10-31
Payer: MEDICARE

## 2024-10-31 PROCEDURE — 73130 X-RAY EXAM OF HAND: CPT | Mod: 50

## 2024-10-31 PROCEDURE — 72110 X-RAY EXAM L-2 SPINE 4/>VWS: CPT

## 2024-10-31 PROCEDURE — 73521 X-RAY EXAM HIPS BI 2 VIEWS: CPT

## 2024-10-31 NOTE — ED PROVIDER NOTE - PSYCHIATRIC, MLM
T wave inversion in EKG T wave inversion in EKG T wave inversion in EKG T wave inversion in EKG Alert and oriented to person, place, time/situation. normal mood and affect. no apparent risk to self or others.

## 2024-11-13 ENCOUNTER — APPOINTMENT (OUTPATIENT)
Dept: INFECTIOUS DISEASE | Facility: CLINIC | Age: 59
End: 2024-11-13
Payer: MEDICARE

## 2024-11-13 VITALS
HEART RATE: 69 BPM | HEIGHT: 67 IN | SYSTOLIC BLOOD PRESSURE: 122 MMHG | OXYGEN SATURATION: 9 % | BODY MASS INDEX: 30.61 KG/M2 | DIASTOLIC BLOOD PRESSURE: 86 MMHG | WEIGHT: 195 LBS

## 2024-11-13 DIAGNOSIS — Z80.3 FAMILY HISTORY OF MALIGNANT NEOPLASM OF BREAST: ICD-10-CM

## 2024-11-13 DIAGNOSIS — J45.909 UNSPECIFIED ASTHMA, UNCOMPLICATED: ICD-10-CM

## 2024-11-13 DIAGNOSIS — N39.0 URINARY TRACT INFECTION, SITE NOT SPECIFIED: ICD-10-CM

## 2024-11-13 DIAGNOSIS — Z82.49 FAMILY HISTORY OF ISCHEMIC HEART DISEASE AND OTHER DISEASES OF THE CIRCULATORY SYSTEM: ICD-10-CM

## 2024-11-13 DIAGNOSIS — Z83.3 FAMILY HISTORY OF DIABETES MELLITUS: ICD-10-CM

## 2024-11-13 PROCEDURE — 99204 OFFICE O/P NEW MOD 45 MIN: CPT

## 2024-11-13 RX ORDER — DEXLANSOPRAZOLE 60 MG/1
CAPSULE, DELAYED RELEASE ORAL
Refills: 0 | Status: ACTIVE | COMMUNITY

## 2024-11-13 RX ORDER — ROSUVASTATIN CALCIUM 20 MG/1
20 TABLET, FILM COATED ORAL
Refills: 0 | Status: ACTIVE | COMMUNITY

## 2024-11-13 RX ORDER — TRAMADOL HYDROCHLORIDE 25 MG/1
TABLET, COATED ORAL
Refills: 0 | Status: ACTIVE | COMMUNITY

## 2024-11-13 RX ORDER — METHENAMINE MANDELATE 1000 MG/1
1 TABLET, FILM COATED ORAL
Qty: 60 | Refills: 5 | Status: ACTIVE | COMMUNITY
Start: 2024-11-13 | End: 1900-01-01

## 2024-11-14 RX ORDER — METHENAMINE HIPPURATE 1 G/1
1 TABLET ORAL TWICE DAILY
Qty: 60 | Refills: 11 | Status: ACTIVE | COMMUNITY
Start: 2024-11-14 | End: 1900-01-01

## 2024-12-05 ENCOUNTER — APPOINTMENT (OUTPATIENT)
Dept: ULTRASOUND IMAGING | Facility: CLINIC | Age: 59
End: 2024-12-05
Payer: MEDICARE

## 2024-12-05 PROCEDURE — 76775 US EXAM ABDO BACK WALL LIM: CPT

## 2024-12-18 ENCOUNTER — APPOINTMENT (OUTPATIENT)
Dept: INFECTIOUS DISEASE | Facility: CLINIC | Age: 59
End: 2024-12-18
Payer: MEDICARE

## 2024-12-18 VITALS
OXYGEN SATURATION: 98 % | DIASTOLIC BLOOD PRESSURE: 79 MMHG | TEMPERATURE: 98.3 F | SYSTOLIC BLOOD PRESSURE: 129 MMHG | BODY MASS INDEX: 30.13 KG/M2 | HEIGHT: 67 IN | WEIGHT: 192 LBS | HEART RATE: 61 BPM

## 2024-12-18 DIAGNOSIS — N39.0 URINARY TRACT INFECTION, SITE NOT SPECIFIED: ICD-10-CM

## 2024-12-18 PROCEDURE — 99212 OFFICE O/P EST SF 10 MIN: CPT

## 2024-12-18 RX ORDER — ACETOHYDROXAMIC ACID 250 MG/1
250 TABLET ORAL 3 TIMES DAILY
Qty: 84 | Refills: 3 | Status: ACTIVE | COMMUNITY
Start: 2024-12-18 | End: 1900-01-01

## 2024-12-20 LAB
APPEARANCE: CLEAR
BACTERIA: NEGATIVE /HPF
BILIRUBIN URINE: NEGATIVE
BLOOD URINE: NEGATIVE
CAST: 0 /LPF
COLOR: YELLOW
EPITHELIAL CELLS: 1 /HPF
GLUCOSE QUALITATIVE U: NEGATIVE MG/DL
KETONES URINE: NEGATIVE MG/DL
LEUKOCYTE ESTERASE URINE: NEGATIVE
MICROSCOPIC-UA: NORMAL
NITRITE URINE: NEGATIVE
PH URINE: 6
PROTEIN URINE: NEGATIVE MG/DL
RED BLOOD CELLS URINE: 0 /HPF
SPECIFIC GRAVITY URINE: 1.01
UROBILINOGEN URINE: 0.2 MG/DL
WHITE BLOOD CELLS URINE: 0 /HPF

## 2025-01-07 ENCOUNTER — NON-APPOINTMENT (OUTPATIENT)
Age: 60
End: 2025-01-07

## 2025-01-10 ENCOUNTER — TRANSCRIPTION ENCOUNTER (OUTPATIENT)
Age: 60
End: 2025-01-10

## 2025-01-27 ENCOUNTER — NON-APPOINTMENT (OUTPATIENT)
Age: 60
End: 2025-01-27

## 2025-01-28 ENCOUNTER — NON-APPOINTMENT (OUTPATIENT)
Age: 60
End: 2025-01-28

## 2025-01-31 ENCOUNTER — NON-APPOINTMENT (OUTPATIENT)
Age: 60
End: 2025-01-31

## 2025-02-03 ENCOUNTER — NON-APPOINTMENT (OUTPATIENT)
Age: 60
End: 2025-02-03

## 2025-02-06 ENCOUNTER — NON-APPOINTMENT (OUTPATIENT)
Age: 60
End: 2025-02-06

## 2025-02-14 ENCOUNTER — NON-APPOINTMENT (OUTPATIENT)
Age: 60
End: 2025-02-14

## 2025-02-14 DIAGNOSIS — N39.0 URINARY TRACT INFECTION, SITE NOT SPECIFIED: ICD-10-CM

## 2025-02-24 ENCOUNTER — NON-APPOINTMENT (OUTPATIENT)
Age: 60
End: 2025-02-24

## 2025-03-10 ENCOUNTER — APPOINTMENT (OUTPATIENT)
Dept: INFECTIOUS DISEASE | Facility: CLINIC | Age: 60
End: 2025-03-10
Payer: MEDICARE

## 2025-03-10 DIAGNOSIS — N39.0 URINARY TRACT INFECTION, SITE NOT SPECIFIED: ICD-10-CM

## 2025-03-10 PROCEDURE — 99212 OFFICE O/P EST SF 10 MIN: CPT | Mod: 2W

## 2025-04-02 ENCOUNTER — TRANSCRIPTION ENCOUNTER (OUTPATIENT)
Age: 60
End: 2025-04-02

## 2025-04-30 ENCOUNTER — NON-APPOINTMENT (OUTPATIENT)
Age: 60
End: 2025-04-30

## 2025-05-05 ENCOUNTER — TRANSCRIPTION ENCOUNTER (OUTPATIENT)
Age: 60
End: 2025-05-05

## 2025-08-16 ENCOUNTER — EMERGENCY (EMERGENCY)
Facility: HOSPITAL | Age: 60
LOS: 1 days | End: 2025-08-16
Attending: EMERGENCY MEDICINE
Payer: MEDICARE

## 2025-08-16 VITALS
RESPIRATION RATE: 18 BRPM | TEMPERATURE: 98 F | OXYGEN SATURATION: 100 % | HEART RATE: 73 BPM | DIASTOLIC BLOOD PRESSURE: 78 MMHG | SYSTOLIC BLOOD PRESSURE: 124 MMHG

## 2025-08-16 VITALS
SYSTOLIC BLOOD PRESSURE: 136 MMHG | HEART RATE: 74 BPM | RESPIRATION RATE: 18 BRPM | OXYGEN SATURATION: 95 % | DIASTOLIC BLOOD PRESSURE: 82 MMHG | WEIGHT: 167.99 LBS | TEMPERATURE: 98 F | HEIGHT: 67 IN

## 2025-08-16 LAB
ALBUMIN SERPL ELPH-MCNC: 4 G/DL — SIGNIFICANT CHANGE UP (ref 3.5–5)
ALP SERPL-CCNC: 51 U/L — SIGNIFICANT CHANGE UP (ref 40–120)
ALT FLD-CCNC: 45 U/L DA — SIGNIFICANT CHANGE UP (ref 10–60)
ANION GAP SERPL CALC-SCNC: 3 MMOL/L — LOW (ref 5–17)
APPEARANCE UR: ABNORMAL
AST SERPL-CCNC: 19 U/L — SIGNIFICANT CHANGE UP (ref 10–40)
BACTERIA # UR AUTO: ABNORMAL /HPF
BASOPHILS # BLD AUTO: 0.04 K/UL — SIGNIFICANT CHANGE UP (ref 0–0.2)
BASOPHILS NFR BLD AUTO: 0.6 % — SIGNIFICANT CHANGE UP (ref 0–2)
BILIRUB SERPL-MCNC: 0.5 MG/DL — SIGNIFICANT CHANGE UP (ref 0.2–1.2)
BILIRUB UR-MCNC: NEGATIVE — SIGNIFICANT CHANGE UP
BUN SERPL-MCNC: 16 MG/DL — SIGNIFICANT CHANGE UP (ref 7–18)
CALCIUM SERPL-MCNC: 9.4 MG/DL — SIGNIFICANT CHANGE UP (ref 8.4–10.5)
CHLORIDE SERPL-SCNC: 107 MMOL/L — SIGNIFICANT CHANGE UP (ref 96–108)
CO2 SERPL-SCNC: 29 MMOL/L — SIGNIFICANT CHANGE UP (ref 22–31)
COLOR SPEC: YELLOW — SIGNIFICANT CHANGE UP
CREAT SERPL-MCNC: 0.6 MG/DL — SIGNIFICANT CHANGE UP (ref 0.5–1.3)
DIFF PNL FLD: ABNORMAL
EGFR: 104 ML/MIN/1.73M2 — SIGNIFICANT CHANGE UP
EGFR: 104 ML/MIN/1.73M2 — SIGNIFICANT CHANGE UP
EOSINOPHIL # BLD AUTO: 0.07 K/UL — SIGNIFICANT CHANGE UP (ref 0–0.5)
EOSINOPHIL NFR BLD AUTO: 1 % — SIGNIFICANT CHANGE UP (ref 0–6)
GLUCOSE SERPL-MCNC: 106 MG/DL — HIGH (ref 70–99)
GLUCOSE UR QL: NEGATIVE MG/DL — SIGNIFICANT CHANGE UP
HCT VFR BLD CALC: 35.2 % — SIGNIFICANT CHANGE UP (ref 34.5–45)
HGB BLD-MCNC: 12.2 G/DL — SIGNIFICANT CHANGE UP (ref 11.5–15.5)
HIV 1 & 2 AB SERPL IA.RAPID: SIGNIFICANT CHANGE UP
IMM GRANULOCYTES NFR BLD AUTO: 0.1 % — SIGNIFICANT CHANGE UP (ref 0–0.9)
KETONES UR QL: NEGATIVE MG/DL — SIGNIFICANT CHANGE UP
LEUKOCYTE ESTERASE UR-ACNC: ABNORMAL
LYMPHOCYTES # BLD AUTO: 1.25 K/UL — SIGNIFICANT CHANGE UP (ref 1–3.3)
LYMPHOCYTES # BLD AUTO: 18.7 % — SIGNIFICANT CHANGE UP (ref 13–44)
MAGNESIUM SERPL-MCNC: 2 MG/DL — SIGNIFICANT CHANGE UP (ref 1.6–2.6)
MCHC RBC-ENTMCNC: 30.3 PG — SIGNIFICANT CHANGE UP (ref 27–34)
MCHC RBC-ENTMCNC: 34.7 G/DL — SIGNIFICANT CHANGE UP (ref 32–36)
MCV RBC AUTO: 87.6 FL — SIGNIFICANT CHANGE UP (ref 80–100)
MONOCYTES # BLD AUTO: 0.43 K/UL — SIGNIFICANT CHANGE UP (ref 0–0.9)
MONOCYTES NFR BLD AUTO: 6.4 % — SIGNIFICANT CHANGE UP (ref 2–14)
NEUTROPHILS # BLD AUTO: 4.88 K/UL — SIGNIFICANT CHANGE UP (ref 1.8–7.4)
NEUTROPHILS NFR BLD AUTO: 73.2 % — SIGNIFICANT CHANGE UP (ref 43–77)
NITRITE UR-MCNC: NEGATIVE — SIGNIFICANT CHANGE UP
NRBC BLD AUTO-RTO: 0 /100 WBCS — SIGNIFICANT CHANGE UP (ref 0–0)
PH UR: 6 — SIGNIFICANT CHANGE UP (ref 5–8)
PHOSPHATE SERPL-MCNC: 3.4 MG/DL — SIGNIFICANT CHANGE UP (ref 2.5–4.5)
PLATELET # BLD AUTO: 209 K/UL — SIGNIFICANT CHANGE UP (ref 150–400)
POTASSIUM SERPL-MCNC: 4 MMOL/L — SIGNIFICANT CHANGE UP (ref 3.5–5.3)
POTASSIUM SERPL-SCNC: 4 MMOL/L — SIGNIFICANT CHANGE UP (ref 3.5–5.3)
PROT SERPL-MCNC: 7.6 G/DL — SIGNIFICANT CHANGE UP (ref 6–8.3)
PROT UR-MCNC: 300 MG/DL
RBC # BLD: 4.02 M/UL — SIGNIFICANT CHANGE UP (ref 3.8–5.2)
RBC # FLD: 12.3 % — SIGNIFICANT CHANGE UP (ref 10.3–14.5)
RBC CASTS # UR COMP ASSIST: ABNORMAL /HPF
SODIUM SERPL-SCNC: 139 MMOL/L — SIGNIFICANT CHANGE UP (ref 135–145)
SP GR SPEC: 1.02 — SIGNIFICANT CHANGE UP (ref 1–1.03)
UROBILINOGEN FLD QL: 1 MG/DL — SIGNIFICANT CHANGE UP (ref 0.2–1)
WBC # BLD: 6.68 K/UL — SIGNIFICANT CHANGE UP (ref 3.8–10.5)
WBC # FLD AUTO: 6.68 K/UL — SIGNIFICANT CHANGE UP (ref 3.8–10.5)
WBC UR QL: ABNORMAL /HPF (ref 0–5)

## 2025-08-16 PROCEDURE — 81001 URINALYSIS AUTO W/SCOPE: CPT

## 2025-08-16 PROCEDURE — 86703 HIV-1/HIV-2 1 RESULT ANTBDY: CPT

## 2025-08-16 PROCEDURE — 84100 ASSAY OF PHOSPHORUS: CPT

## 2025-08-16 PROCEDURE — 96375 TX/PRO/DX INJ NEW DRUG ADDON: CPT

## 2025-08-16 PROCEDURE — 80053 COMPREHEN METABOLIC PANEL: CPT

## 2025-08-16 PROCEDURE — 96365 THER/PROPH/DIAG IV INF INIT: CPT

## 2025-08-16 PROCEDURE — 99284 EMERGENCY DEPT VISIT MOD MDM: CPT | Mod: 25

## 2025-08-16 PROCEDURE — 76775 US EXAM ABDO BACK WALL LIM: CPT

## 2025-08-16 PROCEDURE — 99284 EMERGENCY DEPT VISIT MOD MDM: CPT

## 2025-08-16 PROCEDURE — 86803 HEPATITIS C AB TEST: CPT

## 2025-08-16 PROCEDURE — 76775 US EXAM ABDO BACK WALL LIM: CPT | Mod: 26

## 2025-08-16 PROCEDURE — 85025 COMPLETE CBC W/AUTO DIFF WBC: CPT

## 2025-08-16 PROCEDURE — 36415 COLL VENOUS BLD VENIPUNCTURE: CPT

## 2025-08-16 PROCEDURE — 83735 ASSAY OF MAGNESIUM: CPT

## 2025-08-16 PROCEDURE — 87086 URINE CULTURE/COLONY COUNT: CPT

## 2025-08-16 PROCEDURE — 87186 SC STD MICRODIL/AGAR DIL: CPT

## 2025-08-16 RX ORDER — CEFTRIAXONE 500 MG/1
1000 INJECTION, POWDER, FOR SOLUTION INTRAMUSCULAR; INTRAVENOUS ONCE
Refills: 0 | Status: COMPLETED | OUTPATIENT
Start: 2025-08-16 | End: 2025-08-16

## 2025-08-16 RX ORDER — ONDANSETRON HCL/PF 4 MG/2 ML
1 VIAL (ML) INJECTION
Qty: 30 | Refills: 0
Start: 2025-08-16 | End: 2025-08-20

## 2025-08-16 RX ORDER — PHENAZOPYRIDINE HCL 100 MG
1 TABLET ORAL
Qty: 9 | Refills: 0
Start: 2025-08-16 | End: 2025-08-18

## 2025-08-16 RX ORDER — CEFDINIR 250 MG/5ML
1 POWDER, FOR SUSPENSION ORAL
Qty: 20 | Refills: 0
Start: 2025-08-16 | End: 2025-08-25

## 2025-08-16 RX ORDER — PHENAZOPYRIDINE HCL 100 MG
200 TABLET ORAL ONCE
Refills: 0 | Status: COMPLETED | OUTPATIENT
Start: 2025-08-16 | End: 2025-08-16

## 2025-08-16 RX ORDER — KETOROLAC TROMETHAMINE 30 MG/ML
15 INJECTION, SOLUTION INTRAMUSCULAR; INTRAVENOUS ONCE
Refills: 0 | Status: DISCONTINUED | OUTPATIENT
Start: 2025-08-16 | End: 2025-08-16

## 2025-08-16 RX ORDER — ONDANSETRON HCL/PF 4 MG/2 ML
4 VIAL (ML) INJECTION ONCE
Refills: 0 | Status: COMPLETED | OUTPATIENT
Start: 2025-08-16 | End: 2025-08-16

## 2025-08-16 RX ADMIN — CEFTRIAXONE 100 MILLIGRAM(S): 500 INJECTION, POWDER, FOR SOLUTION INTRAMUSCULAR; INTRAVENOUS at 10:18

## 2025-08-16 RX ADMIN — CEFTRIAXONE 1000 MILLIGRAM(S): 500 INJECTION, POWDER, FOR SOLUTION INTRAMUSCULAR; INTRAVENOUS at 10:38

## 2025-08-16 RX ADMIN — Medication 4 MILLIGRAM(S): at 09:51

## 2025-08-16 RX ADMIN — Medication 200 MILLIGRAM(S): at 10:45

## 2025-08-16 RX ADMIN — KETOROLAC TROMETHAMINE 15 MILLIGRAM(S): 30 INJECTION, SOLUTION INTRAMUSCULAR; INTRAVENOUS at 10:21

## 2025-08-16 RX ADMIN — Medication 2000 MILLILITER(S): at 09:51

## 2025-08-16 RX ADMIN — KETOROLAC TROMETHAMINE 15 MILLIGRAM(S): 30 INJECTION, SOLUTION INTRAMUSCULAR; INTRAVENOUS at 09:51

## 2025-08-16 RX ADMIN — Medication 1000 MILLILITER(S): at 10:38

## 2025-08-17 LAB
HCV AB S/CO SERPL IA: 0.24 S/CO — SIGNIFICANT CHANGE UP (ref 0–0.79)
HCV AB SERPL-IMP: SIGNIFICANT CHANGE UP

## 2025-08-20 LAB
-  AMOXICILLIN/CLAVULANIC ACID: SIGNIFICANT CHANGE UP
-  AMPICILLIN/SULBACTAM: SIGNIFICANT CHANGE UP
-  AMPICILLIN: SIGNIFICANT CHANGE UP
-  AZTREONAM: SIGNIFICANT CHANGE UP
-  CEFAZOLIN: SIGNIFICANT CHANGE UP
-  CEFEPIME: SIGNIFICANT CHANGE UP
-  CEFOXITIN: SIGNIFICANT CHANGE UP
-  CEFTRIAXONE: SIGNIFICANT CHANGE UP
-  CEFUROXIME: SIGNIFICANT CHANGE UP
-  CIPROFLOXACIN: SIGNIFICANT CHANGE UP
-  ERTAPENEM: SIGNIFICANT CHANGE UP
-  GENTAMICIN: SIGNIFICANT CHANGE UP
-  IMIPENEM: SIGNIFICANT CHANGE UP
-  LEVOFLOXACIN: SIGNIFICANT CHANGE UP
-  MEROPENEM: SIGNIFICANT CHANGE UP
-  NITROFURANTOIN: SIGNIFICANT CHANGE UP
-  PIPERACILLIN/TAZOBACTAM: SIGNIFICANT CHANGE UP
-  TIGECYCLINE: SIGNIFICANT CHANGE UP
-  TOBRAMYCIN: SIGNIFICANT CHANGE UP
-  TRIMETHOPRIM/SULFAMETHOXAZOLE: SIGNIFICANT CHANGE UP
CULTURE RESULTS: ABNORMAL
METHOD TYPE: SIGNIFICANT CHANGE UP
ORGANISM # SPEC MICROSCOPIC CNT: ABNORMAL
ORGANISM # SPEC MICROSCOPIC CNT: ABNORMAL
SPECIMEN SOURCE: SIGNIFICANT CHANGE UP